# Patient Record
Sex: MALE | Race: WHITE | Employment: OTHER | URBAN - METROPOLITAN AREA
[De-identification: names, ages, dates, MRNs, and addresses within clinical notes are randomized per-mention and may not be internally consistent; named-entity substitution may affect disease eponyms.]

---

## 2019-12-19 ENCOUNTER — HOSPITAL ENCOUNTER (OUTPATIENT)
Dept: SURGERY | Age: 69
Discharge: HOME | End: 2019-12-19

## 2019-12-19 VITALS — HEIGHT: 72 IN | BODY MASS INDEX: 26.68 KG/M2 | WEIGHT: 197 LBS

## 2019-12-19 PROBLEM — J44.9 COPD (CHRONIC OBSTRUCTIVE PULMONARY DISEASE) (HCC): Status: ACTIVE | Noted: 2019-12-19

## 2019-12-19 PROBLEM — Z95.1 H/O CORONARY ARTERY BYPASS SURGERY: Status: ACTIVE | Noted: 2019-12-19

## 2019-12-19 PROBLEM — E11.9 DM (DIABETES MELLITUS) (HCC): Status: ACTIVE | Noted: 2019-12-19

## 2019-12-19 PROBLEM — I48.91 A-FIB (HCC): Status: ACTIVE | Noted: 2019-12-19

## 2019-12-19 PROBLEM — G62.9 NEUROPATHY: Status: ACTIVE | Noted: 2019-12-19

## 2019-12-19 PROBLEM — Z94.0 KIDNEY TRANSPLANT RECIPIENT: Status: ACTIVE | Noted: 2019-12-19

## 2019-12-19 PROBLEM — L97.409 HEEL ULCER (HCC): Status: ACTIVE | Noted: 2019-12-19

## 2019-12-19 PROBLEM — I25.10 CAD (CORONARY ARTERY DISEASE): Status: ACTIVE | Noted: 2019-12-19

## 2019-12-19 PROBLEM — I73.9 PAD (PERIPHERAL ARTERY DISEASE) (HCC): Status: ACTIVE | Noted: 2019-12-19

## 2019-12-19 RX ORDER — UREA 10 %
800 LOTION (ML) TOPICAL DAILY
COMMUNITY

## 2019-12-19 RX ORDER — INSULIN ASPART 100 [IU]/ML
INJECTION, SOLUTION INTRAVENOUS; SUBCUTANEOUS
Status: ON HOLD | COMMUNITY
End: 2020-03-13 | Stop reason: CLARIF

## 2019-12-19 RX ORDER — OMEPRAZOLE 40 MG/1
40 CAPSULE, DELAYED RELEASE ORAL DAILY
COMMUNITY

## 2019-12-19 RX ORDER — CLINDAMYCIN HYDROCHLORIDE 150 MG/1
300 CAPSULE ORAL 2 TIMES DAILY
COMMUNITY
End: 2019-12-23 | Stop reason: CLARIF

## 2019-12-19 RX ORDER — PREDNISONE 20 MG/1
5 TABLET ORAL
COMMUNITY

## 2019-12-19 RX ORDER — INSULIN DEGLUDEC INJECTION 200 U/ML
30 INJECTION, SOLUTION SUBCUTANEOUS DAILY
COMMUNITY

## 2019-12-19 RX ORDER — TACROLIMUS 1 MG/1
2 CAPSULE ORAL EVERY 12 HOURS
COMMUNITY

## 2019-12-19 RX ORDER — CHOLECALCIFEROL (VITAMIN D3) 125 MCG
10 CAPSULE ORAL
COMMUNITY

## 2019-12-19 RX ORDER — LEVOTHYROXINE SODIUM 100 UG/1
100 TABLET ORAL
COMMUNITY

## 2019-12-19 RX ORDER — TRAMADOL HYDROCHLORIDE 50 MG/1
50 TABLET ORAL
COMMUNITY
End: 2020-01-09

## 2019-12-19 RX ORDER — ATORVASTATIN CALCIUM 40 MG/1
40 TABLET, FILM COATED ORAL
COMMUNITY

## 2019-12-19 RX ORDER — AMLODIPINE BESYLATE 10 MG/1
5 TABLET ORAL DAILY
COMMUNITY

## 2019-12-19 RX ORDER — AMOXICILLIN AND CLAVULANATE POTASSIUM 875; 125 MG/1; MG/1
TABLET, FILM COATED ORAL EVERY 12 HOURS
COMMUNITY
End: 2020-03-12

## 2019-12-19 RX ORDER — HYDROCODONE BITARTRATE AND ACETAMINOPHEN 5; 325 MG/1; MG/1
TABLET ORAL
COMMUNITY
End: 2020-01-09

## 2019-12-19 RX ORDER — CHOLECALCIFEROL TAB 125 MCG (5000 UNIT) 125 MCG
5000 TAB ORAL DAILY
COMMUNITY

## 2019-12-19 RX ORDER — ONDANSETRON HYDROCHLORIDE 8 MG/1
8 TABLET, FILM COATED ORAL
COMMUNITY
End: 2020-01-10

## 2019-12-19 RX ORDER — BUDESONIDE AND FORMOTEROL FUMARATE DIHYDRATE 160; 4.5 UG/1; UG/1
2 AEROSOL RESPIRATORY (INHALATION) 2 TIMES DAILY
COMMUNITY

## 2019-12-19 RX ORDER — WARFARIN 3 MG/1
3 TABLET ORAL
COMMUNITY

## 2019-12-19 RX ORDER — ALBUTEROL SULFATE 90 UG/1
AEROSOL, METERED RESPIRATORY (INHALATION)
COMMUNITY

## 2019-12-19 RX ORDER — MYCOPHENOLATE MOFETIL 250 MG/1
500 CAPSULE ORAL 2 TIMES DAILY
COMMUNITY

## 2019-12-19 NOTE — PERIOP NOTES
Per [facility] at Dr. Ishmael Coronel office it is ok for patient to remain on coumadin.

## 2019-12-19 NOTE — PERIOP NOTES
[facility],  for Dr. Eric Garcia regarding coumadin. She is to call surgeon and call me back.

## 2019-12-19 NOTE — PERIOP NOTES
Patient verified name&  . Order to obtain consent not found in EHR & patient verified  case posting. Request sent to [facility] for cardiac records. Type 1B surgery,  assessment complete. Orders no yet received. Labs per surgeon: none at this time  Labs per anesthesia protocol: bmp, hgb, pt/inr signed and held [id number] Patient answered medical/surgical history questions at their best of ability. All prior to admission medications reviewed with patient and documented in [address] [facility]. Patient instructed to take ONLY THE FOLLOWING MEDICATIONS ON THE DAY OF SURGERY according to anesthesia guidelines with sips of water: amlodipine, symbicort inhaler as needed, hydrocodone or tramadol as needed, levothyroxine, omeprazole, zofran if needed, prednisone, prograf, cellcept . Patient instructed to bring albuterol inhaler dos and to take 24 units tresiba insulin dos. Pt verbalizes understanding to HOLD THE FOLLOWING MEDICATIONS: none    PT VERBALIZES UNDERSTANDING TO HOLD ALL VITAMINS AND SUPPLEMENTS 7 DAYS PRIOR TO SURGERY DATE (with exception to Renal Vitamins) and NSAIDS 5 DAYS PRIOR TO SURGERY DATE PER ANESTHESIA PROTOCOL. Patient instructed on the following:  Arrive at [facility], time of arrival to be called the day before by 1700. NPO after midnight including gum, mints, tobacco and ice chips. Responsible adult must drive patient to the hospital, stay during surgery, and patient requires supervision 24 hours after anesthesia  Use non-moisturizing soap in shower the night before surgery and on the morning of surgery. Leave all valuables (money and jewelry) at home but bring insurance card and ID on DOS. Do not wear make-up, nail polish, lotions, cologne, perfumes, powders, or oil on skin. Patient teach back successful and patient demonstrates knowledge of instruction. If you do not receive a call with your arrival time by 5pm the business day prior to surgery, please call [phone].

## 2019-12-23 ENCOUNTER — HOSPITAL ENCOUNTER (OUTPATIENT)
Dept: SURGERY | Age: 69
Discharge: HOME | End: 2019-12-23
Payer: MEDICARE

## 2019-12-23 VITALS
WEIGHT: 207 LBS | TEMPERATURE: 98.1 F | RESPIRATION RATE: 18 BRPM | DIASTOLIC BLOOD PRESSURE: 74 MMHG | BODY MASS INDEX: 28.04 KG/M2 | SYSTOLIC BLOOD PRESSURE: 128 MMHG | HEART RATE: 87 BPM | HEIGHT: 72 IN | OXYGEN SATURATION: 97 %

## 2019-12-23 LAB
ANION GAP SERPL CALC-SCNC: 7 MMOL/L (ref 7–16)
ATRIAL RATE: 241 BPM
BASOPHILS # BLD: 0.1 K/UL (ref 0–0.2)
BASOPHILS NFR BLD: 1 % (ref 0–2)
BUN SERPL-MCNC: 28 MG/DL (ref 8–23)
CALCIUM SERPL-MCNC: 9.4 MG/DL (ref 8.3–10.4)
CALCULATED R AXIS, ECG10: -91 DEGREES
CALCULATED T AXIS, ECG11: 94 DEGREES
CHLORIDE SERPL-SCNC: 102 MMOL/L (ref 98–107)
CO2 SERPL-SCNC: 27 MMOL/L (ref 21–32)
CREAT SERPL-MCNC: 1.38 MG/DL (ref 0.8–1.5)
DIAGNOSIS, 93000: NORMAL
DIFFERENTIAL METHOD BLD: ABNORMAL
EOSINOPHIL # BLD: 0.2 K/UL (ref 0–0.8)
EOSINOPHIL NFR BLD: 1 % (ref 0.5–7.8)
ERYTHROCYTE [DISTWIDTH] IN BLOOD BY AUTOMATED COUNT: 14.3 % (ref 11.9–14.6)
GLUCOSE BLD STRIP.AUTO-MCNC: 183 MG/DL (ref 65–100)
GLUCOSE SERPL-MCNC: 194 MG/DL (ref 65–100)
HCT VFR BLD AUTO: 51.6 % (ref 41.1–50.3)
HGB BLD-MCNC: 15.9 G/DL (ref 13.6–17.2)
IMM GRANULOCYTES # BLD AUTO: 0.2 K/UL (ref 0–0.5)
IMM GRANULOCYTES NFR BLD AUTO: 1 % (ref 0–5)
LYMPHOCYTES # BLD: 1.9 K/UL (ref 0.5–4.6)
LYMPHOCYTES NFR BLD: 15 % (ref 13–44)
MCH RBC QN AUTO: 29.3 PG (ref 26.1–32.9)
MCHC RBC AUTO-ENTMCNC: 30.8 G/DL (ref 31.4–35)
MCV RBC AUTO: 95.2 FL (ref 79.6–97.8)
MONOCYTES # BLD: 1.1 K/UL (ref 0.1–1.3)
MONOCYTES NFR BLD: 9 % (ref 4–12)
NEUTS SEG # BLD: 9.4 K/UL (ref 1.7–8.2)
NEUTS SEG NFR BLD: 74 % (ref 43–78)
NRBC # BLD: 0 K/UL (ref 0–0.2)
PLATELET # BLD AUTO: 263 K/UL (ref 150–450)
PMV BLD AUTO: 8.4 FL (ref 9.4–12.3)
POTASSIUM SERPL-SCNC: 4.9 MMOL/L (ref 3.5–5.1)
Q-T INTERVAL, ECG07: 452 MS
QRS DURATION, ECG06: 162 MS
QTC CALCULATION (BEZET), ECG08: 458 MS
RBC # BLD AUTO: 5.42 M/UL (ref 4.23–5.6)
SODIUM SERPL-SCNC: 136 MMOL/L (ref 136–145)
VENTRICULAR RATE, ECG03: 62 BPM
WBC # BLD AUTO: 12.8 K/UL (ref 4.3–11.1)

## 2019-12-23 PROCEDURE — 82962 GLUCOSE BLOOD TEST: CPT

## 2019-12-23 PROCEDURE — 85025 COMPLETE CBC W/AUTO DIFF WBC: CPT

## 2019-12-23 PROCEDURE — 93005 ELECTROCARDIOGRAM TRACING: CPT | Performed by: ANESTHESIOLOGY

## 2019-12-23 PROCEDURE — 93005 ELECTROCARDIOGRAM TRACING: CPT | Performed by: SURGERY

## 2019-12-23 PROCEDURE — 80048 BASIC METABOLIC PNL TOTAL CA: CPT

## 2019-12-23 NOTE — PERIOP NOTES
Dr. Rosie Whitney informed of pt Hx, kidney transplant, cardiac Hx, pacemaker,recent diagnosis of pneumonia completing antibiotics. No respiratory distress- ok to proceed, pacer rep is NOT needed.

## 2019-12-23 NOTE — PERIOP NOTES
Patient verified name and . Patient provided medical/health information and PTA medications to the best of their ability. TYPE  CASE:1b  Order for consent yes found in EHR and matches case posting. Labs per surgeon:cbc,bmp. Results: -  Labs per anesthesia protocol: poc glucose. Results -  EKG  :  yes    Patient provided with and instructed on education handouts including Guide to Surgery, blood transfusions, pain management, and hand hygiene for the family and community, and [facility] Anesthesia [facility] brochure.     hibiclens and instructions given per hospital policy. Instructed patient to continue previous medications as prescribed prior to surgery unless otherwise directed and to take the following medications the day of surgery according to anesthesia guidelines : albuterol as needed, norco or tramadol as needed,zofran as needed,symbicort,levithyroxine,prilosec,prednisone,cellcept,prograf, tresiba insulin-24 units . Instructed patient to hold  the following medications: coumadin,all supplements. Original medication prescription bottles none visualized during patient appointment. Patient teach back successful and patient demonstrates knowledge of instruction.

## 2019-12-25 ENCOUNTER — ANESTHESIA EVENT (OUTPATIENT)
Dept: SURGERY | Age: 69
End: 2019-12-25
Payer: MEDICARE

## 2019-12-26 ENCOUNTER — ANESTHESIA (OUTPATIENT)
Dept: SURGERY | Age: 69
End: 2019-12-26
Payer: MEDICARE

## 2019-12-26 ENCOUNTER — HOSPITAL ENCOUNTER (OUTPATIENT)
Age: 69
Setting detail: OUTPATIENT SURGERY
Discharge: HOME | End: 2019-12-26
Attending: SURGERY | Admitting: SURGERY
Payer: MEDICARE

## 2019-12-26 ENCOUNTER — APPOINTMENT (OUTPATIENT)
Dept: INTERVENTIONAL RADIOLOGY/VASCULAR | Age: 69
End: 2019-12-26
Attending: SURGERY
Payer: MEDICARE

## 2019-12-26 VITALS
RESPIRATION RATE: 18 BRPM | WEIGHT: 206.8 LBS | HEART RATE: 59 BPM | HEIGHT: 72 IN | BODY MASS INDEX: 28.01 KG/M2 | TEMPERATURE: 97.6 F | DIASTOLIC BLOOD PRESSURE: 71 MMHG | SYSTOLIC BLOOD PRESSURE: 138 MMHG | OXYGEN SATURATION: 93 %

## 2019-12-26 LAB
GLUCOSE BLD STRIP.AUTO-MCNC: 81 MG/DL (ref 65–100)
GLUCOSE BLD STRIP.AUTO-MCNC: 87 MG/DL (ref 65–100)
INR BLD: 1.1 (ref 0.9–1.2)
PT BLD: 13.2 SECS (ref 9.6–11.6)

## 2019-12-26 PROCEDURE — 74011250636 HC RX REV CODE- 250/636: Performed by: ANESTHESIOLOGY

## 2019-12-26 PROCEDURE — C1769 GUIDE WIRE: HCPCS

## 2019-12-26 PROCEDURE — 74011250636 HC RX REV CODE- 250/636: Performed by: NURSE ANESTHETIST, CERTIFIED REGISTERED

## 2019-12-26 PROCEDURE — C1725 CATH, TRANSLUMIN NON-LASER: HCPCS

## 2019-12-26 PROCEDURE — 76937 US GUIDE VASCULAR ACCESS: CPT

## 2019-12-26 PROCEDURE — 37226 HC PLC STNT FEMPOP UNI +/-PTA: CPT

## 2019-12-26 PROCEDURE — C1887 CATHETER, GUIDING: HCPCS

## 2019-12-26 PROCEDURE — 76060000035 HC ANESTHESIA 2 TO 2.5 HR: Performed by: SURGERY

## 2019-12-26 PROCEDURE — 76210000026 HC REC RM PH II 1 TO 1.5 HR: Performed by: SURGERY

## 2019-12-26 PROCEDURE — 74011250636 HC RX REV CODE- 250/636: Performed by: SURGERY

## 2019-12-26 PROCEDURE — 76010000131 HC OR TIME 2 TO 2.5 HR: Performed by: SURGERY

## 2019-12-26 PROCEDURE — C1760 CLOSURE DEV, VASC: HCPCS

## 2019-12-26 PROCEDURE — C1894 INTRO/SHEATH, NON-LASER: HCPCS

## 2019-12-26 PROCEDURE — 74011636320 HC RX REV CODE- 636/320: Performed by: SURGERY

## 2019-12-26 PROCEDURE — C1874 STENT, COATED/COV W/DEL SYS: HCPCS

## 2019-12-26 PROCEDURE — 82962 GLUCOSE BLOOD TEST: CPT

## 2019-12-26 PROCEDURE — 74011250637 HC RX REV CODE- 250/637: Performed by: SURGERY

## 2019-12-26 PROCEDURE — 77030013519 HC DEV INFL BASIX MRTM -B

## 2019-12-26 PROCEDURE — 76210000016 HC OR PH I REC 1 TO 1.5 HR: Performed by: SURGERY

## 2019-12-26 PROCEDURE — 77030032660 HC CATH ANGI PRPH SUPP CXI COOK -C

## 2019-12-26 PROCEDURE — 75710 ARTERY X-RAYS ARM/LEG: CPT

## 2019-12-26 PROCEDURE — 85610 PROTHROMBIN TIME: CPT

## 2019-12-26 DEVICE — ZILVER PTX, DRUG-ELUTING PERIPHERAL STENT
Type: IMPLANTABLE DEVICE | Site: GROIN | Status: FUNCTIONAL
Brand: ZILVER PTX

## 2019-12-26 RX ORDER — CLOPIDOGREL BISULFATE 75 MG/1
75 TABLET ORAL DAILY
Qty: 30 TAB | Refills: 5 | Status: SHIPPED | OUTPATIENT
Start: 2019-12-26

## 2019-12-26 RX ORDER — SODIUM CHLORIDE 0.9 % (FLUSH) 0.9 %
5-40 SYRINGE (ML) INJECTION AS NEEDED
Status: DISCONTINUED | OUTPATIENT
Start: 2019-12-26 | End: 2019-12-26 | Stop reason: HOSPADM

## 2019-12-26 RX ORDER — SODIUM CHLORIDE 0.9 % (FLUSH) 0.9 %
5-40 SYRINGE (ML) INJECTION EVERY 8 HOURS
Status: DISCONTINUED | OUTPATIENT
Start: 2019-12-26 | End: 2019-12-26 | Stop reason: HOSPADM

## 2019-12-26 RX ORDER — ONDANSETRON 2 MG/ML
4 INJECTION INTRAMUSCULAR; INTRAVENOUS
Status: DISCONTINUED | OUTPATIENT
Start: 2019-12-26 | End: 2019-12-26 | Stop reason: HOSPADM

## 2019-12-26 RX ORDER — NALOXONE HYDROCHLORIDE 0.4 MG/ML
0.2 INJECTION, SOLUTION INTRAMUSCULAR; INTRAVENOUS; SUBCUTANEOUS
Status: DISCONTINUED | OUTPATIENT
Start: 2019-12-26 | End: 2019-12-26 | Stop reason: HOSPADM

## 2019-12-26 RX ORDER — SODIUM CHLORIDE, SODIUM LACTATE, POTASSIUM CHLORIDE, CALCIUM CHLORIDE 600; 310; 30; 20 MG/100ML; MG/100ML; MG/100ML; MG/100ML
75 INJECTION, SOLUTION INTRAVENOUS CONTINUOUS
Status: DISCONTINUED | OUTPATIENT
Start: 2019-12-26 | End: 2019-12-26 | Stop reason: HOSPADM

## 2019-12-26 RX ORDER — OXYCODONE AND ACETAMINOPHEN 5; 325 MG/1; MG/1
1 TABLET ORAL
Status: DISCONTINUED | OUTPATIENT
Start: 2019-12-26 | End: 2019-12-26 | Stop reason: HOSPADM

## 2019-12-26 RX ORDER — CEFAZOLIN SODIUM/WATER 2 G/20 ML
2 SYRINGE (ML) INTRAVENOUS ONCE
Status: COMPLETED | OUTPATIENT
Start: 2019-12-26 | End: 2019-12-26

## 2019-12-26 RX ORDER — SODIUM CHLORIDE, SODIUM LACTATE, POTASSIUM CHLORIDE, CALCIUM CHLORIDE 600; 310; 30; 20 MG/100ML; MG/100ML; MG/100ML; MG/100ML
25 INJECTION, SOLUTION INTRAVENOUS CONTINUOUS
Status: DISCONTINUED | OUTPATIENT
Start: 2019-12-26 | End: 2019-12-26 | Stop reason: HOSPADM

## 2019-12-26 RX ORDER — NALOXONE HYDROCHLORIDE 0.4 MG/ML
0.2 INJECTION, SOLUTION INTRAMUSCULAR; INTRAVENOUS; SUBCUTANEOUS AS NEEDED
Status: DISCONTINUED | OUTPATIENT
Start: 2019-12-26 | End: 2019-12-26 | Stop reason: HOSPADM

## 2019-12-26 RX ORDER — MIDAZOLAM HYDROCHLORIDE 1 MG/ML
2 INJECTION, SOLUTION INTRAMUSCULAR; INTRAVENOUS
Status: DISCONTINUED | OUTPATIENT
Start: 2019-12-26 | End: 2019-12-26 | Stop reason: HOSPADM

## 2019-12-26 RX ORDER — FENTANYL CITRATE 50 UG/ML
100 INJECTION, SOLUTION INTRAMUSCULAR; INTRAVENOUS ONCE
Status: DISCONTINUED | OUTPATIENT
Start: 2019-12-26 | End: 2019-12-26 | Stop reason: HOSPADM

## 2019-12-26 RX ORDER — DIPHENHYDRAMINE HYDROCHLORIDE 50 MG/ML
12.5 INJECTION, SOLUTION INTRAMUSCULAR; INTRAVENOUS
Status: DISCONTINUED | OUTPATIENT
Start: 2019-12-26 | End: 2019-12-26 | Stop reason: HOSPADM

## 2019-12-26 RX ORDER — OXYCODONE HYDROCHLORIDE 5 MG/1
5 TABLET ORAL
Status: DISCONTINUED | OUTPATIENT
Start: 2019-12-26 | End: 2019-12-26 | Stop reason: HOSPADM

## 2019-12-26 RX ORDER — SODIUM CHLORIDE 9 MG/ML
1 INJECTION, SOLUTION INTRAVENOUS AS NEEDED
Status: DISCONTINUED | OUTPATIENT
Start: 2019-12-26 | End: 2019-12-26 | Stop reason: HOSPADM

## 2019-12-26 RX ORDER — CLOPIDOGREL BISULFATE 75 MG/1
300 TABLET ORAL ONCE
Status: COMPLETED | OUTPATIENT
Start: 2019-12-26 | End: 2019-12-26

## 2019-12-26 RX ORDER — HYDROMORPHONE HYDROCHLORIDE 2 MG/ML
0.5 INJECTION, SOLUTION INTRAMUSCULAR; INTRAVENOUS; SUBCUTANEOUS
Status: DISCONTINUED | OUTPATIENT
Start: 2019-12-26 | End: 2019-12-26 | Stop reason: HOSPADM

## 2019-12-26 RX ORDER — PROPOFOL 10 MG/ML
INJECTION, EMULSION INTRAVENOUS AS NEEDED
Status: DISCONTINUED | OUTPATIENT
Start: 2019-12-26 | End: 2019-12-26 | Stop reason: HOSPADM

## 2019-12-26 RX ORDER — LIDOCAINE HYDROCHLORIDE 10 MG/ML
0.1 INJECTION INFILTRATION; PERINEURAL AS NEEDED
Status: DISCONTINUED | OUTPATIENT
Start: 2019-12-26 | End: 2019-12-26 | Stop reason: HOSPADM

## 2019-12-26 RX ORDER — HEPARIN SODIUM 1000 [USP'U]/ML
INJECTION, SOLUTION INTRAVENOUS; SUBCUTANEOUS AS NEEDED
Status: DISCONTINUED | OUTPATIENT
Start: 2019-12-26 | End: 2019-12-26 | Stop reason: HOSPADM

## 2019-12-26 RX ORDER — PROPOFOL 10 MG/ML
INJECTION, EMULSION INTRAVENOUS
Status: DISCONTINUED | OUTPATIENT
Start: 2019-12-26 | End: 2019-12-26 | Stop reason: HOSPADM

## 2019-12-26 RX ORDER — HEPARIN SODIUM 200 [USP'U]/100ML
INJECTION, SOLUTION INTRAVENOUS
Status: COMPLETED | OUTPATIENT
Start: 2019-12-26 | End: 2019-12-26

## 2019-12-26 RX ORDER — MORPHINE SULFATE 10 MG/ML
1 INJECTION, SOLUTION INTRAMUSCULAR; INTRAVENOUS
Status: DISCONTINUED | OUTPATIENT
Start: 2019-12-26 | End: 2019-12-26 | Stop reason: HOSPADM

## 2019-12-26 RX ORDER — SODIUM CHLORIDE 9 MG/ML
3 INJECTION, SOLUTION INTRAVENOUS ONCE
Status: COMPLETED | OUTPATIENT
Start: 2019-12-26 | End: 2019-12-26

## 2019-12-26 RX ORDER — SODIUM CHLORIDE 9 MG/ML
125 INJECTION, SOLUTION INTRAVENOUS CONTINUOUS
Status: DISCONTINUED | OUTPATIENT
Start: 2019-12-26 | End: 2019-12-26 | Stop reason: HOSPADM

## 2019-12-26 RX ORDER — MIDAZOLAM HYDROCHLORIDE 1 MG/ML
2 INJECTION, SOLUTION INTRAMUSCULAR; INTRAVENOUS ONCE
Status: DISCONTINUED | OUTPATIENT
Start: 2019-12-26 | End: 2019-12-26 | Stop reason: HOSPADM

## 2019-12-26 RX ADMIN — PROPOFOL 200 MCG/KG/MIN: 10 INJECTION, EMULSION INTRAVENOUS at 07:33

## 2019-12-26 RX ADMIN — PHENYLEPHRINE HYDROCHLORIDE 200 MCG: 10 INJECTION INTRAVENOUS at 08:29

## 2019-12-26 RX ADMIN — PROPOFOL 80 MG: 10 INJECTION, EMULSION INTRAVENOUS at 07:33

## 2019-12-26 RX ADMIN — PHENYLEPHRINE HYDROCHLORIDE 100 MCG: 10 INJECTION INTRAVENOUS at 08:36

## 2019-12-26 RX ADMIN — SODIUM CHLORIDE 3 ML/KG/HR: 900 INJECTION, SOLUTION INTRAVENOUS at 06:44

## 2019-12-26 RX ADMIN — HEPARIN SODIUM 2000 UNITS: 1000 INJECTION INTRAVENOUS; SUBCUTANEOUS at 08:34

## 2019-12-26 RX ADMIN — Medication 2 G: at 07:37

## 2019-12-26 RX ADMIN — CLOPIDOGREL BISULFATE 300 MG: 75 TABLET ORAL at 11:49

## 2019-12-26 RX ADMIN — SODIUM CHLORIDE, SODIUM LACTATE, POTASSIUM CHLORIDE, AND CALCIUM CHLORIDE: 600; 310; 30; 20 INJECTION, SOLUTION INTRAVENOUS at 07:28

## 2019-12-26 RX ADMIN — HYDROMORPHONE HYDROCHLORIDE 0.5 MG: 2 INJECTION INTRAMUSCULAR; INTRAVENOUS; SUBCUTANEOUS at 10:08

## 2019-12-26 RX ADMIN — HEPARIN SODIUM 4000 UNITS: 1000 INJECTION INTRAVENOUS; SUBCUTANEOUS at 07:52

## 2019-12-26 NOTE — ANESTHESIA PREPROCEDURE EVALUATION
Relevant Problems   No relevant active problems       Anesthetic History               Review of Systems / Medical History  Patient summary reviewed and pertinent labs reviewed    Pulmonary    COPD: mild    Sleep apnea (30 pk years; Quit 2014)           Neuro/Psych     seizures (After fall)         Cardiovascular    Hypertension: well controlled        Dysrhythmias (SSS) : atrial fibrillation  CAD and PAD (Non healing ulcer)    Exercise tolerance: >4 METS  Comments: Denies CP, SOB or changes in functional status   GI/Hepatic/Renal     GERD: well controlled           Endo/Other    Diabetes: well controlled, type 2, using insulin  Hypothyroidism: well controlled       Other Findings              Physical Exam    Airway  Mallampati: II  TM Distance: 4 - 6 cm  Neck ROM: normal range of motion   Mouth opening: Normal     Cardiovascular    Rhythm: regular  Rate: normal         Dental    Dentition: Edentulous     Pulmonary  Breath sounds clear to auscultation               Abdominal  GI exam deferred       Other Findings            Anesthetic Plan    ASA: 3  Anesthesia type: total IV anesthesia          Induction: Intravenous  Anesthetic plan and risks discussed with: Patient

## 2019-12-26 NOTE — DISCHARGE INSTRUCTIONS
Your Recovery  The doctor inserted a thin, flexible tube (catheter) into a blood vessel in your groin. In some cases, the catheter is placed in a blood vessel in the arm. After an arteriogram, your groin or arm may have a bruise and feel sore for a day or two. You can do light activities around the house but nothing strenuous for several days. Your doctor may give you specific instructions on when you can do your normal activities again, such as driving and going back to work. This care sheet gives you a general idea about how long it will take for you to recover. But each person recovers at a different pace. Follow the steps below to feel better as quickly as possible. How can you care for yourself at home? Activity  · Do not do strenuous exercise and do not lift, pull, or push anything heavy until your doctor says it is okay. This may be for a day or two. You can walk around the house and do light activity, such as cooking. · You may shower 24 to 48 hours after the procedure, if your doctor okays it. Pat the incision dry. Do not take a bath for 1 week, or until your doctor tells you it is okay. · If the catheter was placed in your groin, try not to walk up stairs for the first couple of days. · If your doctor recommends it, get more exercise. Walking is a good choice. Bit by bit, increase the amount you walk every day. Try for at least 30 minutes on most days of the week. Diet  · Drink plenty of fluids to help your body flush out the dye. If you have kidney, heart, or liver disease and have to limit fluids, talk with your doctor before you increase the amount of fluids you drink. · You can eat your normal diet. If your stomach is upset, try bland, low-fat foods like plain rice, broiled chicken, toast, and yogurt. Medicines  · Be safe with medicines. Read and follow all instructions on the label. ¨ If the doctor gave you a prescription medicine for pain, take it as prescribed.   ¨ If you are not taking a prescription pain medicine, ask your doctor if you can take an over-the-counter medicine. · If you take blood thinners, such as warfarin (Coumadin), clopidogrel (Plavix), or aspirin, be sure to talk to your doctor. He or she will tell you if and when to start taking those medicines again. Make sure that you understand exactly what your doctor wants you to do. · Your doctor will tell you if and when you can restart your medicines. He or she will also give you instructions about taking any new medicines. Care of the catheter site  · You will have a dressing over the cut (incision). A dressing helps the incision heal and protects it. Your doctor will tell you how to take care of this. · Put ice or a cold pack on the area for 10 to 20 minutes at a time to help with soreness or swelling. Put a thin cloth between the ice and your skin. Follow-up care is a key part of your treatment and safety. Be sure to make and go to all appointments, and call your doctor if you are having problems. It's also a good idea to know your test results and keep a list of the medicines you take. When should you call for help? Call 911 anytime you think you may need emergency care. For example, call if:  · You passed out (lost consciousness). · You have severe trouble breathing. · You have sudden chest pain and shortness of breath, or you cough up blood. Call your doctor now or seek immediate medical care if:  · You are bleeding from the area where the catheter was put in your artery. · You have a fast-growing, painful lump at the catheter site. · You have signs of infection, such as:  ¨ Increased pain, swelling, warmth, or redness. ¨ Red streaks leading from the incision. ¨ Pus draining from the incision. ¨ A fever. Watch closely for any changes in your health, and be sure to contact your doctor if:  · You don't get better as expected.     After general anesthesia or intravenous sedation, for 24 hours or while taking prescription Narcotics:  · Limit your activities  · A responsible adult needs to be with you for the next 24 hours  · Do not drive and operate hazardous machinery  · Do not make important personal or business decisions  · Do  not drink alcoholic beverages  · If you have not urinated within 8 hours after discharge, please contact your surgeon on call. · If you have sleep apnea and have a CPAP machine, please use it for all naps and sleeping. · Please use caution when taking narcotics and any of your home medications that may cause drowsiness. *  Please give a list of your current medications to your Primary Care Provider. *  Please update this list whenever your medications are discontinued, doses are      changed, or new medications (including over-the-counter products) are added. *  Please carry medication information at all times in case of emergency situations. These are general instructions for a healthy lifestyle:  No smoking/ No tobacco products/ Avoid exposure to second hand smoke  Surgeon General's Warning:  Quitting smoking now greatly reduces serious risk to your health. Obesity, smoking, and sedentary lifestyle greatly increases your risk for illness  A healthy diet, regular physical exercise & weight monitoring are important for maintaining a healthy lifestyle    You may be retaining fluid if you have a history of heart failure or if you experience any of the following symptoms:  Weight gain of 3 pounds or more overnight or 5 pounds in a week, increased swelling in our hands or feet or shortness of breath while lying flat in bed. Please call your doctor as soon as you notice any of these symptoms; do not wait until your next office visit.

## 2019-12-26 NOTE — ANESTHESIA POSTPROCEDURE EVALUATION
Procedure(s):  RIGHT LOWER EXTREMITY ARTERIOGRAM WITH ULTRASOUND GUIDED ACCESS/STENT PLACEMENT/PTA.    total IV anesthesia    Anesthesia Post Evaluation      Multimodal analgesia: multimodal analgesia used between 6 hours prior to anesthesia start to PACU discharge  Patient location during evaluation: bedside  Patient participation: complete - patient participated  Level of consciousness: awake and alert  Pain score: 1  Pain management: adequate  Airway patency: patent  Anesthetic complications: no  Cardiovascular status: acceptable  Respiratory status: acceptable  Hydration status: acceptable  Comments: Pt doing well. Ok to d/c home. Post anesthesia nausea and vomiting:  none      Vitals Value Taken Time   /82 12/26/2019 10:14 AM   Temp 36.4 °C (97.6 °F) 12/26/2019 10:12 AM   Pulse 59 12/26/2019 10:16 AM   Resp 18 12/26/2019 10:12 AM   SpO2 91 % 12/26/2019 10:16 AM   Vitals shown include unvalidated device data.

## 2019-12-26 NOTE — BRIEF OP NOTE
BRIEF OPERATIVE NOTE    Date of Procedure: 12/26/2019   Preoperative Diagnosis: PAD (peripheral artery disease) ([facility]) [I73.9]  Postoperative Diagnosis: PAD (peripheral artery disease) ([facility]) [I73.9]    Procedure(s):  RIGHT LOWER EXTREMITY ARTERIOGRAM WITH ULTRASOUND GUIDED ACCESS/STENT PLACEMENT/BALLOON  Surgeon(s) and Role:     * Shalini Noguera MD - Primary         Surgical Assistant:     Surgical Staff:  Circ-1: Lenin Stauffer RN  Circ-Relief: Charo Aldridge RN  Radiology Technician: Paco Mitchell; Lavonne Pandya RT, R, CT  Event Time In Time Out   Incision Start  7:50 AM    Incision Close  9:24 AM      Anesthesia: General   Estimated Blood Loss: minimal  Specimens: * No specimens in log *   Findings:    Complications: none  Implants:   Implant Name Type Inv.  Item Serial No.  Lot No. LRB No. Used Action   STENT SERG W/THMBWHL 40X6MM -- ZILVER [id number] - [id number] Stent STENT SERG W/THMBWHL 40X6MM -- [employer] [id number] Right 1 Implanted   STENT SERG [id number] -- ZILVER [id number] - [id number] Stent STENT SERG W/[id number] -- ZILVER [id number]  [employer] [id number] Right 1 Implanted

## 2019-12-28 NOTE — PROCEDURES
[address] [facility]  PROCEDURE NOTE    Name:  Anya Evnagelista  MR#:  [id number]  :  1950  ACCOUNT #:  [de-identified]  DATE OF SERVICE:  2019    PREOPERATIVE DIAGNOSES:  Nonhealing right foot wound, right lower extremity ischemia. POSTOPERATIVE DIAGNOSES:  Nonhealing right foot wound, right lower extremity ischemia. PROCEDURES PERFORMED:  1. Aortogram, right lower extremity arteriogram, PTA and stent of right popliteal artery. 2.  Ultrasound-guided vascular access. SURGEON:  Destinee Hudson MD    ASSISTANT:  None. ANESTHESIA:  IV sedation plus 1% lidocaine as local.    TOTAL CONTRAST:  21 mL. TOTAL FLUORO TIME:  37 minutes. COMPLICATIONS:  .    SPECIMENS REMOVED:  .    IMPLANTS:  .    ESTIMATED BLOOD LOSS:  .    DESCRIPTION OF THE PROCEDURE:  See the patient was brought to the angio suite, placed on the angio table in the supine position. Following adequate IV sedation and time-out procedure, the groins were draped and prepped in the sterile fashion. Left common femoral artery was then percutaneously punctured under direct vision using ultrasound. A 5-Brazilian sheath was placed over guidewire via Seldinger technique. Next, a 0.035 guidewire was advanced in the aorta followed by 5-Brazilian Omni flush catheter. Using CO2, abdominal aortogram was performed. The catheter was pulled down just above the aortic bifurcation where a pelvic imaging was performed again with CO2. Next, the catheter was used to direct the guidewire into the right side. A 6-Brazilian crossover sheath was advanced from the left to the right. Using CO2, the right lower extremity was  throughout its length. The area of popliteal occlusion was identified as well as distal SFA disease. The patient was systemically heparinized. Next, a 0.014 guidewire and catheter combination were used to cross the area of short segment occlusion of the popliteal artery.   A very limited contrast injection was performed to evaluate the occlusion as well as the distal flow. Next, the area was predilated with a 3 mm balloon followed by a 4 mm balloon. Ultimately, a 6 x 40 Zilver PTX stent was placed. This was postdilated with a 5 mm balloon. This demonstrated excellent result and again, a limited contrast injection was performed. There is area of irregularity with stenosis in the distal SFA as well. At this point, a second stent was placed which was 6 x 120 Zilver PTX stent. This was postdilated to 6 mm. Completion imaging demonstrated excellent flow with antegrade flow across the previously occluded segment and in-line flow to the foot. At this point, guidewires and catheters removed and the site was closed with a Mynx closure device. Sterile dry dressings were applied. The patient was awakened from anesthesia and transported to recovery room in stable addition. The patient tolerated the procedure well. No complications. ANGIOGRAPHIC FINDINGS:  The CO2 arteriogram demonstrated the aorta to be of normal caliber. There is no evidence of stenosis or aneurysmal change. The common internal and external iliac arteries were patent. The transplant renal artery was identified as it arises from the external iliac artery and there is a nephrogram present. Beyond this, the common femoral and profunda are patent. There was very moderate stenosis of the proximal SFA. Distally, there is significant stenosis at the level of Jeff's canal and then a short segment occlusion of the popliteal artery. There is reconstitution of the distal popliteal artery. The anterior tibial artery is patent to the foot. The posterior tibial artery is patent to the foot as well as fairly atretic at the foot. There is filling in the plantar arch on the foot via the dorsalis pedis artery. IMPRESSION:  Satisfactory recannulation of short segment right popliteal artery occlusion.       MD JENNY [id number]  D:  12/27/2019 10:50  T:  12/27/2019 22:53  JOB #:  [id number]

## 2020-01-09 ENCOUNTER — ANESTHESIA EVENT (OUTPATIENT)
Dept: SURGERY | Age: 70
End: 2020-01-09
Payer: MEDICARE

## 2020-01-10 ENCOUNTER — ANESTHESIA (OUTPATIENT)
Dept: SURGERY | Age: 70
End: 2020-01-10
Payer: MEDICARE

## 2020-01-10 ENCOUNTER — HOSPITAL ENCOUNTER (OUTPATIENT)
Age: 70
Setting detail: OUTPATIENT SURGERY
Discharge: HOME | End: 2020-01-10
Attending: PODIATRIST | Admitting: PODIATRIST
Payer: MEDICARE

## 2020-01-10 VITALS
HEART RATE: 62 BPM | HEIGHT: 73 IN | SYSTOLIC BLOOD PRESSURE: 168 MMHG | BODY MASS INDEX: 27.3 KG/M2 | DIASTOLIC BLOOD PRESSURE: 78 MMHG | RESPIRATION RATE: 16 BRPM | WEIGHT: 206 LBS | TEMPERATURE: 98 F | OXYGEN SATURATION: 95 %

## 2020-01-10 DIAGNOSIS — Z98.890 S/P FOOT SURGERY, RIGHT: Primary | ICD-10-CM

## 2020-01-10 LAB
GLUCOSE BLD STRIP.AUTO-MCNC: 103 MG/DL (ref 65–100)
GLUCOSE BLD STRIP.AUTO-MCNC: 149 MG/DL (ref 65–100)
GLUCOSE BLD STRIP.AUTO-MCNC: 226 MG/DL (ref 65–100)
INR BLD: 1.2 (ref 0.9–1.2)
PT BLD: 14.1 SECS (ref 9.6–11.6)

## 2020-01-10 PROCEDURE — 76210000006 HC OR PH I REC 0.5 TO 1 HR: Performed by: PODIATRIST

## 2020-01-10 PROCEDURE — 74011250636 HC RX REV CODE- 250/636: Performed by: PODIATRIST

## 2020-01-10 PROCEDURE — 74011000250 HC RX REV CODE- 250: Performed by: NURSE ANESTHETIST, CERTIFIED REGISTERED

## 2020-01-10 PROCEDURE — 77030000032 HC CUF TRNQT ZIMM -B: Performed by: PODIATRIST

## 2020-01-10 PROCEDURE — 74011250636 HC RX REV CODE- 250/636: Performed by: NURSE ANESTHETIST, CERTIFIED REGISTERED

## 2020-01-10 PROCEDURE — 76060000032 HC ANESTHESIA 0.5 TO 1 HR: Performed by: PODIATRIST

## 2020-01-10 PROCEDURE — 82962 GLUCOSE BLOOD TEST: CPT

## 2020-01-10 PROCEDURE — 74011250636 HC RX REV CODE- 250/636: Performed by: ANESTHESIOLOGY

## 2020-01-10 PROCEDURE — 76010000160 HC OR TIME 0.5 TO 1 HR INTENSV-TIER 1: Performed by: PODIATRIST

## 2020-01-10 PROCEDURE — 74011636637 HC RX REV CODE- 636/637: Performed by: ANESTHESIOLOGY

## 2020-01-10 PROCEDURE — 76210000020 HC REC RM PH II FIRST 0.5 HR: Performed by: PODIATRIST

## 2020-01-10 PROCEDURE — 77030018836 HC SOL IRR NACL ICUM -A: Performed by: PODIATRIST

## 2020-01-10 PROCEDURE — 74011250637 HC RX REV CODE- 250/637: Performed by: ANESTHESIOLOGY

## 2020-01-10 PROCEDURE — 85610 PROTHROMBIN TIME: CPT

## 2020-01-10 PROCEDURE — 74011000250 HC RX REV CODE- 250: Performed by: PODIATRIST

## 2020-01-10 DEVICE — IMPLANTABLE DEVICE: Type: IMPLANTABLE DEVICE | Site: HEEL | Status: FUNCTIONAL

## 2020-01-10 RX ORDER — PROPOFOL 10 MG/ML
INJECTION, EMULSION INTRAVENOUS AS NEEDED
Status: DISCONTINUED | OUTPATIENT
Start: 2020-01-10 | End: 2020-01-10 | Stop reason: HOSPADM

## 2020-01-10 RX ORDER — INSULIN LISPRO 100 [IU]/ML
5 INJECTION, SOLUTION INTRAVENOUS; SUBCUTANEOUS ONCE
Status: COMPLETED | OUTPATIENT
Start: 2020-01-10 | End: 2020-01-10

## 2020-01-10 RX ORDER — LIDOCAINE HYDROCHLORIDE 10 MG/ML
INJECTION INFILTRATION; PERINEURAL AS NEEDED
Status: DISCONTINUED | OUTPATIENT
Start: 2020-01-10 | End: 2020-01-10 | Stop reason: HOSPADM

## 2020-01-10 RX ORDER — PROPOFOL 10 MG/ML
INJECTION, EMULSION INTRAVENOUS
Status: DISCONTINUED | OUTPATIENT
Start: 2020-01-10 | End: 2020-01-10 | Stop reason: HOSPADM

## 2020-01-10 RX ORDER — ONDANSETRON 2 MG/ML
4 INJECTION INTRAMUSCULAR; INTRAVENOUS
Status: DISCONTINUED | OUTPATIENT
Start: 2020-01-10 | End: 2020-01-10 | Stop reason: HOSPADM

## 2020-01-10 RX ORDER — SODIUM CHLORIDE, SODIUM LACTATE, POTASSIUM CHLORIDE, CALCIUM CHLORIDE 600; 310; 30; 20 MG/100ML; MG/100ML; MG/100ML; MG/100ML
1000 INJECTION, SOLUTION INTRAVENOUS CONTINUOUS
Status: DISCONTINUED | OUTPATIENT
Start: 2020-01-10 | End: 2020-01-10 | Stop reason: HOSPADM

## 2020-01-10 RX ORDER — ALBUTEROL SULFATE 0.83 MG/ML
2.5 SOLUTION RESPIRATORY (INHALATION) AS NEEDED
Status: DISCONTINUED | OUTPATIENT
Start: 2020-01-10 | End: 2020-01-10 | Stop reason: HOSPADM

## 2020-01-10 RX ORDER — CLINDAMYCIN PHOSPHATE 900 MG/50ML
900 INJECTION INTRAVENOUS
Status: COMPLETED | OUTPATIENT
Start: 2020-01-10 | End: 2020-01-10

## 2020-01-10 RX ORDER — ONDANSETRON HYDROCHLORIDE 8 MG/1
8 TABLET, FILM COATED ORAL
Qty: 30 TAB | Refills: 0 | Status: SHIPPED | OUTPATIENT
Start: 2020-01-10

## 2020-01-10 RX ORDER — ACETAMINOPHEN 500 MG
1000 TABLET ORAL ONCE
Status: COMPLETED | OUTPATIENT
Start: 2020-01-10 | End: 2020-01-10

## 2020-01-10 RX ORDER — MIDAZOLAM HYDROCHLORIDE 1 MG/ML
2 INJECTION, SOLUTION INTRAMUSCULAR; INTRAVENOUS
Status: DISCONTINUED | OUTPATIENT
Start: 2020-01-10 | End: 2020-01-10 | Stop reason: HOSPADM

## 2020-01-10 RX ORDER — OXYCODONE AND ACETAMINOPHEN 5; 325 MG/1; MG/1
1 TABLET ORAL
Qty: 30 TAB | Refills: 0 | Status: SHIPPED | OUTPATIENT
Start: 2020-01-10 | End: 2020-01-15

## 2020-01-10 RX ORDER — HYDROMORPHONE HYDROCHLORIDE 2 MG/ML
0.5 INJECTION, SOLUTION INTRAMUSCULAR; INTRAVENOUS; SUBCUTANEOUS
Status: DISCONTINUED | OUTPATIENT
Start: 2020-01-10 | End: 2020-01-10 | Stop reason: HOSPADM

## 2020-01-10 RX ORDER — BUPIVACAINE HYDROCHLORIDE 5 MG/ML
INJECTION, SOLUTION EPIDURAL; INTRACAUDAL AS NEEDED
Status: DISCONTINUED | OUTPATIENT
Start: 2020-01-10 | End: 2020-01-10 | Stop reason: HOSPADM

## 2020-01-10 RX ORDER — LIDOCAINE HYDROCHLORIDE 20 MG/ML
INJECTION, SOLUTION EPIDURAL; INFILTRATION; INTRACAUDAL; PERINEURAL AS NEEDED
Status: DISCONTINUED | OUTPATIENT
Start: 2020-01-10 | End: 2020-01-10 | Stop reason: HOSPADM

## 2020-01-10 RX ORDER — LIDOCAINE HYDROCHLORIDE 10 MG/ML
0.1 INJECTION INFILTRATION; PERINEURAL AS NEEDED
Status: DISCONTINUED | OUTPATIENT
Start: 2020-01-10 | End: 2020-01-10 | Stop reason: HOSPADM

## 2020-01-10 RX ADMIN — SODIUM CHLORIDE, SODIUM LACTATE, POTASSIUM CHLORIDE, AND CALCIUM CHLORIDE 1000 ML: 600; 310; 30; 20 INJECTION, SOLUTION INTRAVENOUS at 12:10

## 2020-01-10 RX ADMIN — PROPOFOL 30 MG: 10 INJECTION, EMULSION INTRAVENOUS at 13:29

## 2020-01-10 RX ADMIN — ACETAMINOPHEN 1000 MG: 500 TABLET, FILM COATED ORAL at 12:10

## 2020-01-10 RX ADMIN — PROPOFOL 50 MG: 10 INJECTION, EMULSION INTRAVENOUS at 13:22

## 2020-01-10 RX ADMIN — INSULIN LISPRO 5 UNITS: 100 INJECTION, SOLUTION INTRAVENOUS; SUBCUTANEOUS at 12:09

## 2020-01-10 RX ADMIN — PROPOFOL 100 MCG/KG/MIN: 10 INJECTION, EMULSION INTRAVENOUS at 13:22

## 2020-01-10 RX ADMIN — CLINDAMYCIN PHOSPHATE 900 MG: 900 INJECTION, SOLUTION INTRAVENOUS at 13:15

## 2020-01-10 RX ADMIN — LIDOCAINE HYDROCHLORIDE 40 MG: 20 INJECTION, SOLUTION EPIDURAL; INFILTRATION; INTRACAUDAL; PERINEURAL at 13:22

## 2020-01-10 RX ADMIN — SODIUM CHLORIDE, SODIUM LACTATE, POTASSIUM CHLORIDE, AND CALCIUM CHLORIDE: 600; 310; 30; 20 INJECTION, SOLUTION INTRAVENOUS at 13:18

## 2020-01-10 RX ADMIN — PROPOFOL 30 MG: 10 INJECTION, EMULSION INTRAVENOUS at 13:27

## 2020-01-10 NOTE — ANESTHESIA PREPROCEDURE EVALUATION
Relevant Problems   No relevant active problems       Anesthetic History               Review of Systems / Medical History  Patient summary reviewed and pertinent labs reviewed    Pulmonary    COPD: mild    Sleep apnea (30 pk years; Quit 2014)           Neuro/Psych     seizures (After fall)         Cardiovascular    Hypertension: well controlled      CHF (45%-50%)  Dysrhythmias (SSS) : atrial fibrillation  CAD and PAD (Non healing ulcer)    Exercise tolerance: >4 METS  Comments: Denies CP, SOB or changes in functional status.    GI/Hepatic/Renal     GERD: well controlled           Endo/Other    Diabetes: well controlled, type 2, using insulin  Hypothyroidism: well controlled       Other Findings              Physical Exam    Airway  Mallampati: II  TM Distance: 4 - 6 cm  Neck ROM: normal range of motion   Mouth opening: Normal     Cardiovascular    Rhythm: regular  Rate: normal      Pertinent negatives: No murmur   Dental    Dentition: Edentulous     Pulmonary  Breath sounds clear to auscultation               Abdominal  GI exam deferred       Other Findings            Anesthetic Plan    ASA: 3  Anesthesia type: total IV anesthesia          Induction: Intravenous  Anesthetic plan and risks discussed with: Patient      5u lispro pre-op

## 2020-01-10 NOTE — ANESTHESIA POSTPROCEDURE EVALUATION
Procedure(s):  RIGHT HEEL EXCISION/ DEBRIDEMENT FULL THICKNESS  HEEL ULCER W/ APPLICATION OF PURAPLY  .     total IV anesthesia    Anesthesia Post Evaluation      Multimodal analgesia: multimodal analgesia used between 6 hours prior to anesthesia start to PACU discharge  Patient location during evaluation: PACU  Patient participation: complete - patient participated  Level of consciousness: awake  Pain management: adequate  Airway patency: patent  Anesthetic complications: no  Cardiovascular status: acceptable  Respiratory status: acceptable  Hydration status: acceptable  Post anesthesia nausea and vomiting:  none      Vitals Value Taken Time   /60 1/10/2020  2:04 PM   Temp 36.9 °C (98.4 °F) 1/10/2020  2:04 PM   Pulse 62 1/10/2020  2:41 PM   Resp 15 1/10/2020  2:06 PM   SpO2 95 % 1/10/2020  2:41 PM

## 2020-01-10 NOTE — BRIEF OP NOTE
BRIEF OPERATIVE NOTE    Date of Procedure: 1/10/2020   Preoperative Diagnosis: Foot ulcer due to secondary DM ([facility] [E13.621, L97.509]  Postoperative Diagnosis: Foot ulcer due to [facility] ([facility] [F37.012, L97.509]    Procedure(s):  RIGHT HEEL EXCISION/ DEBRIDEMENT FULL THICKNESS  HEEL ULCER W/ APPLICATION OF PURAPLY LOCAL W/ MAC   Surgeon(s) and Role:     Martin Lizarraga MD - Primary         Surgical Assistant: none    Surgical Staff:  Circ-1: Chris Rivera RN  Circ-Relief: Nova Roca RN  Scrub Tech-1: Gladys Sung  Event Time In Time Out   Incision Start 1330    Incision Close 1348      Anesthesia: MAC   Estimated Blood Loss: <5ml  Specimens: * No specimens in log *   Findings: fatty necrosis   Complications: none  Implants: * No implants in log *

## 2020-01-10 NOTE — H&P
Patient: Sp Aguilar MRN: [id number]  SSN: [id number]    YOB: 1950  Age: 81 y.o. Sex: male      History and Physical    Sp Aguilar is a 81 y.o. male having Procedure(s):  RIGHT HEEL EXCISION/ DEBRIDEMENT FULL THICKNESS  HEEL ULCER W/ APPLICATION OF PURAPLY LOCAL W/ MAC PACEMAKER. Allergies: Allergies   Allergen Reactions    Codeine Nausea Only    Contrast Agent [Iodine] Other (comments)     Patient can not get IV dye due to renal transplant.     Hydrocodone-Acetaminophen Nausea Only    Opioids - Morphine Analogues Nausea Only        Chief Complaint:heel pain    History of Present Illness: 82 yo WM diabetic with heel ulcer non-healing presents for graft with Dr. Maria Fernanda Tariq    Past Medical History:   Diagnosis Date    Anemia     Arrhythmia     post CABG A fib/flutter leading to sick sinus syndrome- pace maker    Brain bleed ([address] 2018    resulting from fall with head injury while on coumadin    Bronchial pneumonia     CAD (coronary artery disease) 2015    CABG    Chronic kidney disease     renal transplant-2016    Chronic obstructive pulmonary disease (HCC)     mild    Chronic pain     GERD (gastroesophageal reflux disease)     controlled with medication    Hypertension     NSVT (nonsustained ventricular tachycardia) (HCC)     PAD (peripheral artery disease) ([address]     Renal transplant recipient     Respiratory failure ([address]     post CABG requiring tracheostomy    Seizures ([address]     following brain bleed    Sleep apnea     no c pap    Thyroid disease     hypo    Type 2 diabetes mellitus ([address]     avg , HGB A1C 7.5, symptoms hypoglycemia at 52      Past Surgical History:   Procedure Laterality Date    CARDIAC SURG PROCEDURE UNLIST  2015    cabg x4    HX CATARACT REMOVAL Bilateral 2014    HX COLONOSCOPY      HX HEART CATHETERIZATION  2015    HX HERNIA REPAIR  6375    umbilical hernia repair    HX PACEMAKER  2015    dual chamber medtronic    HX TRANSPLANT Right 2016    renal transplant    HX VASCULAR ACCESS      peritoneal dialysis catheter- removed 2017    VASCULAR SURGERY PROCEDURE UNLIST  2019    right lower extremity arteriogram/stent/balloon      Family History   Problem Relation Age of Onset    Stroke Mother     Cancer Father         pancreatic      Social History     Tobacco Use    Smoking status: Former Smoker     Packs/day: 1.00     Years: 30.00     Pack years: 30.00     Last attempt to quit:      Years since quittin.0    Smokeless tobacco: Never Used   Substance Use Topics    Alcohol use: Yes     Comment: rarely        Prior to Admission medications    Medication Sig Start Date End Date Taking? Authorizing Provider   oxyCODONE-acetaminophen (PERCOCET) 5-325 mg per tablet Take  by mouth every six (6) hours as needed for Pain. Yes Provider, Historical   levothyroxine (SYNTHROID) 100 mcg tablet Take 100 mcg by mouth Daily (before breakfast). Yes Provider, Historical   omeprazole (PRILOSEC) 40 mg capsule Take 40 mg by mouth daily. Yes Provider, Historical   tacrolimus (PROGRAF) 1 mg capsule Take 2 mg by mouth every twelve (12) hours. Yes Provider, Historical   mycophenolate mofetil (CELLCEPT) 250 mg capsule Take 500 mg by mouth two (2) times a day. Yes Provider, Historical   predniSONE (DELTASONE) 20 mg tablet Take 5 mg by mouth daily (with breakfast). Yes Provider, Historical   amLODIPine (NORVASC) 10 mg tablet Take 5 mg by mouth daily. Yes Provider, Historical   atorvastatin (LIPITOR) 40 mg tablet Take 40 mg by mouth nightly. Yes Provider, Historical   amoxicillin-clavulanate (AUGMENTIN) 875-125 mg per tablet Take  by mouth every twelve (12) hours. Yes Provider, Historical   ondansetron hcl (ZOFRAN) 8 mg tablet Take 8 mg by mouth every eight (8) hours as needed for Nausea. Yes Provider, Historical   folic acid 352 mcg tablet Take 800 mcg by mouth daily.    Yes Provider, Historical   cholecalciferol (VITAMIN D3) (5000 Units/125 mcg) tab tablet Take 5,000 Units by mouth daily. Yes Provider, Historical   guaiFENesin (MUCINEX) 1,200 mg Ta12 ER tablet Take 1,200 mg by mouth as needed for Congestion. Yes Provider, Historical   budesonide-formoterol (SYMBICORT) 160-4.5 mcg/actuation HFAA Take 2 Puffs by inhalation two (2) times a day. Yes Provider, Historical   albuterol (VENTOLIN HFA) 90 mcg/actuation inhaler Take  by inhalation every four (4) hours as needed. Yes Provider, Historical   melatonin 5 mg tablet Take 10 mg by mouth nightly. Yes Provider, Historical   insulin degludec (TRESIBA FLEXTOUCH U-200) 200 unit/mL (3 mL) inpn 30 Units by SubCUTAneous route daily. Yes Provider, Historical   insulin aspart U-100 (NOVOLOG FLEXPEN U-100 INSULIN) 100 unit/mL (3 mL) inpn by SubCUTAneous route. Sliding scale   Yes Provider, Historical   clopidogrel (PLAVIX) 75 mg tab Take 1 Tab by mouth daily. Indications: blood clot prevention following percutaneous coronary intervention 12/26/19   Humberto Whaley MD   warfarin (COUMADIN) 3 mg tablet Take 3 mg by mouth nightly. Takes 6 mg Sunday and Thursday. Takes 4.5 mg Monday, Wednesday, Friday, Saturday.      Provider, Historical        Visit Vitals  /87 (BP 1 Location: Right arm)   Pulse 87   Temp 36.9 °C (98.4 °F)   Resp 18   Ht 6' 1\" (1.854 m)   Wt 93.4 kg (206 lb)   SpO2 97%   BMI 27.18 kg/m²        Assessment and Plan:   Tania Coon is a 81 y.o. male having Procedure(s):  RIGHT HEEL EXCISION/ DEBRIDEMENT FULL THICKNESS  HEEL ULCER W/ APPLICATION OF PURAPLY LOCAL W/ MAC PACEMAKER for heel pain    Preanesthesia Evaluation     Last edited 01/10/20 1201 by Britton Claire MD  Date of Service 01/10/20 1153             Relevant Problems   No relevant active problems       Anesthetic History               Review of Systems / Medical History  Patient summary reviewed and pertinent labs reviewed    Pulmonary    COPD: mild    Sleep apnea (30 pk years; Quit 2014)           Neuro/Psych     seizures (After fall)         Cardiovascular    Hypertension: well controlled      CHF (45%-50%)  Dysrhythmias (SSS) : atrial fibrillation  CAD and PAD (Non healing ulcer)    Exercise tolerance: >4 METS  Comments: Denies CP, SOB or changes in functional status.    GI/Hepatic/Renal     GERD: well controlled           Endo/Other    Diabetes: well controlled, type 2, using insulin  Hypothyroidism: well controlled       Other Findings              Physical Exam    Airway  Mallampati: II  TM Distance: 4 - 6 cm  Neck ROM: normal range of motion   Mouth opening: Normal     Cardiovascular    Rhythm: regular  Rate: normal      Pertinent negatives: No murmur   Dental    Dentition: Edentulous     Pulmonary  Breath sounds clear to auscultation               Abdominal  GI exam deferred       Other Findings            Anesthetic Plan    ASA: 3  Anesthesia type: total IV anesthesia          Induction: Intravenous  Anesthetic plan and risks discussed with: Patient      5u lispro pre-op          Preanesthesia evaluation performed by Drea Smith MD    Revision History       Date/Time User Provider Type Action    > 01/10/20 1201 Shakira Colin MD Physician Addend     01/10/20 1154 Drea Smith MD Physician Sign                  Signed By: William Tamez MD     January 10, 2020

## 2020-01-10 NOTE — DISCHARGE INSTRUCTIONS
Per Dr Ann Marie Novoa:  -wear boot when walking  -minimal weight bearing  -keep boot clean and dry    After general anesthesia or intravenous sedation, for 24 hours or while taking prescription Narcotics:  · Limit your activities  · A responsible adult needs to be with you for the next 24 hours  · Do not drive and operate hazardous machinery  · Do not make important personal or business decisions  · Do  not drink alcoholic beverages  · If you have not urinated within 8 hours after discharge, please contact your surgeon on call. · If you have sleep apnea and have a CPAP machine, please use it for all naps and sleeping. · Please use caution when taking narcotics and any of your home medications that may cause drowsiness. *  Please give a list of your current medications to your Primary Care Provider. *  Please update this list whenever your medications are discontinued, doses are      changed, or new medications (including over-the-counter products) are added. *  Please carry medication information at all times in case of emergency situations. These are general instructions for a healthy lifestyle:  No smoking/ No tobacco products/ Avoid exposure to second hand smoke  Surgeon General's Warning:  Quitting smoking now greatly reduces serious risk to your health. Obesity, smoking, and sedentary lifestyle greatly increases your risk for illness  A healthy diet, regular physical exercise & weight monitoring are important for maintaining a healthy lifestyle    You may be retaining fluid if you have a history of heart failure or if you experience any of the following symptoms:  Weight gain of 3 pounds or more overnight or 5 pounds in a week, increased swelling in our hands or feet or shortness of breath while lying flat in bed. Please call your doctor as soon as you notice any of these symptoms; do not wait until your next office visit.

## 2020-01-16 NOTE — OP NOTES
[facility]  OPERATIVE REPORT    Name:  Houston Steele  MR#:  [id number]  :  1950  ACCOUNT #:  [de-identified]  DATE OF SERVICE:  01/10/2020    PREOPERATIVE DIAGNOSIS:  Diabetes with foot ulcer. POSTOPERATIVE DIAGNOSIS:  Diabetes with foot ulcer. PROCEDURE PERFORMED:  Excisional debridement, full thickness, right heel ulcer with application of PuraPly AM (05684). SURGEON:  Forest Ballesteros MD    ASSISTANT:  None. ANESTHESIA:  Mac with local block    COMPLICATIONS:  None. SPECIMENS REMOVED:  No specimen sent to pathology    IMPLANTS:  See materials    ESTIMATED BLOOD LOSS:  Less than 5 mL. HEMOSTASIS:  Anatomic dissection. MATERIALS:  PuraPly AM and dry sterile dressing. INJECTABLES:  Preoperative injection with 1% lidocaine plain, postoperative block with 0.5% Marcaine plain. INDICATIONS FOR SURGERY:  The patient is a pleasant diabetic male with a history of kidney transplant. He is well known to my office. The patient stepped on a piece of glass almost 2 months ago, prior to , and went to [address] [place] [address] where an incision exploratory procedure and removal of foreign body was done under local anesthesia at the facility. The patient's wounds failed to heal.  He presented to my office for followup with complaints of a nonhealing wound. I sent the patient to Dr. Aisha Alvarez for vascular consult as his pedal pulses were weak and specifically with his history of comorbidities. The patient was found to have vascular disease on that side, which would explain why the wound was not healing properly. The patient had a vascular intervention with Dr. Johnson. After a good blood flow was restored, I offered surgical debridement with application of grafts in the operating room. Risks and benefits of the procedure were discussed in detail. The patient understands he is at risk of losing his foot.   Consents were signed and the patient was brought to the operating room for the procedure as an outpatient. PROCEDURE:  The patient was brought to the operating room and placed on the operating table in supine position. After MAC anesthesia was obtained, a local injection utilizing at least 10 mL of 1% lidocaine plain was injected around the wound. Next, the foot was then scrubbed, prepped, and draped in the usual aseptic manner. The foot was elevated for exsanguination; however, the pneumatic ankle tourniquet was not inflated. Next, attention was directed to the ulcer on the plantar and lateral aspect of the patient's heel. The incision of the ulcer was debrided full thickness through the subcutaneous tissue of all necrotic fat and fibrotic tissue. The wound margins were excised. Pressure was used for hemostasis. The wound was then flushed with copious amounts of sterile normal saline. Next, PuraPly graft was applied to the wound, secured with benzoin and Steri-Strips. The wound measures 3 x 2.5 cm x 0.2 cm deep. Next, an ABD pad, dry sterile dressing, and Adaptic were applied, followed by Ace wrap. The patient tolerated the procedure and anesthesia well. He was transferred from the operating room to PACU with vital signs stable and neurovascular status intact to the toes of the right foot. He was discharged with both written and oral postoperative instructions.       Cristel Carlisle MD      [email or web address]  D:  01/15/2020 17:16  T:  01/15/2020 23:02  JOB #:  [id number]

## 2020-03-12 ENCOUNTER — ANESTHESIA EVENT (OUTPATIENT)
Dept: SURGERY | Age: 70
End: 2020-03-12
Payer: MEDICARE

## 2020-03-13 ENCOUNTER — HOSPITAL ENCOUNTER (OUTPATIENT)
Age: 70
Setting detail: OUTPATIENT SURGERY
Discharge: HOME | End: 2020-03-13
Attending: PODIATRIST | Admitting: PODIATRIST
Payer: MEDICARE

## 2020-03-13 ENCOUNTER — ANESTHESIA (OUTPATIENT)
Dept: SURGERY | Age: 70
End: 2020-03-13
Payer: MEDICARE

## 2020-03-13 ENCOUNTER — APPOINTMENT (OUTPATIENT)
Dept: GENERAL RADIOLOGY | Age: 70
End: 2020-03-13
Attending: PODIATRIST
Payer: MEDICARE

## 2020-03-13 VITALS
HEART RATE: 66 BPM | BODY MASS INDEX: 27.18 KG/M2 | SYSTOLIC BLOOD PRESSURE: 158 MMHG | OXYGEN SATURATION: 98 % | DIASTOLIC BLOOD PRESSURE: 83 MMHG | WEIGHT: 206 LBS | RESPIRATION RATE: 15 BRPM | TEMPERATURE: 97.7 F

## 2020-03-13 DIAGNOSIS — Z98.890 S/P FOOT SURGERY, RIGHT: Primary | ICD-10-CM

## 2020-03-13 LAB
GLUCOSE BLD STRIP.AUTO-MCNC: 85 MG/DL (ref 65–100)
GLUCOSE BLD STRIP.AUTO-MCNC: 87 MG/DL (ref 65–100)
INR BLD: 1.5 (ref 0.9–1.2)
PT BLD: 17.7 SECS (ref 9.6–11.6)

## 2020-03-13 PROCEDURE — 87106 FUNGI IDENTIFICATION YEAST: CPT

## 2020-03-13 PROCEDURE — 87075 CULTR BACTERIA EXCEPT BLOOD: CPT

## 2020-03-13 PROCEDURE — 76210000063 HC OR PH I REC FIRST 0.5 HR: Performed by: PODIATRIST

## 2020-03-13 PROCEDURE — 74011250636 HC RX REV CODE- 250/636: Performed by: NURSE ANESTHETIST, CERTIFIED REGISTERED

## 2020-03-13 PROCEDURE — 85610 PROTHROMBIN TIME: CPT

## 2020-03-13 PROCEDURE — 76210000021 HC REC RM PH II 0.5 TO 1 HR: Performed by: PODIATRIST

## 2020-03-13 PROCEDURE — 74011000250 HC RX REV CODE- 250: Performed by: PODIATRIST

## 2020-03-13 PROCEDURE — 74011250636 HC RX REV CODE- 250/636: Performed by: PODIATRIST

## 2020-03-13 PROCEDURE — 82962 GLUCOSE BLOOD TEST: CPT

## 2020-03-13 PROCEDURE — 74011250637 HC RX REV CODE- 250/637: Performed by: ANESTHESIOLOGY

## 2020-03-13 PROCEDURE — 88311 DECALCIFY TISSUE: CPT

## 2020-03-13 PROCEDURE — 88305 TISSUE EXAM BY PATHOLOGIST: CPT

## 2020-03-13 PROCEDURE — 76060000032 HC ANESTHESIA 0.5 TO 1 HR: Performed by: PODIATRIST

## 2020-03-13 PROCEDURE — 87153 DNA/RNA SEQUENCING: CPT

## 2020-03-13 PROCEDURE — 74011250636 HC RX REV CODE- 250/636: Performed by: ANESTHESIOLOGY

## 2020-03-13 PROCEDURE — 87186 SC STD MICRODIL/AGAR DIL: CPT

## 2020-03-13 PROCEDURE — 87205 SMEAR GRAM STAIN: CPT

## 2020-03-13 PROCEDURE — 76010000138 HC OR TIME 0.5 TO 1 HR: Performed by: PODIATRIST

## 2020-03-13 PROCEDURE — 87077 CULTURE AEROBIC IDENTIFY: CPT

## 2020-03-13 PROCEDURE — 87076 CULTURE ANAEROBE IDENT EACH: CPT

## 2020-03-13 RX ORDER — DIPHENHYDRAMINE HYDROCHLORIDE 50 MG/ML
12.5 INJECTION, SOLUTION INTRAMUSCULAR; INTRAVENOUS ONCE
Status: DISCONTINUED | OUTPATIENT
Start: 2020-03-13 | End: 2020-03-13 | Stop reason: HOSPADM

## 2020-03-13 RX ORDER — OXYCODONE AND ACETAMINOPHEN 5; 325 MG/1; MG/1
1 TABLET ORAL
Qty: 12 TAB | Refills: 0 | Status: SHIPPED | OUTPATIENT
Start: 2020-03-13 | End: 2020-03-16

## 2020-03-13 RX ORDER — PROPOFOL 10 MG/ML
INJECTION, EMULSION INTRAVENOUS
Status: DISCONTINUED | OUTPATIENT
Start: 2020-03-13 | End: 2020-03-13 | Stop reason: HOSPADM

## 2020-03-13 RX ORDER — OXYCODONE HYDROCHLORIDE 5 MG/1
10 TABLET ORAL
Status: DISCONTINUED | OUTPATIENT
Start: 2020-03-13 | End: 2020-03-13 | Stop reason: HOSPADM

## 2020-03-13 RX ORDER — ACETAMINOPHEN 500 MG
1000 TABLET ORAL ONCE
Status: COMPLETED | OUTPATIENT
Start: 2020-03-13 | End: 2020-03-13

## 2020-03-13 RX ORDER — LIDOCAINE HYDROCHLORIDE 10 MG/ML
0.1 INJECTION INFILTRATION; PERINEURAL AS NEEDED
Status: DISCONTINUED | OUTPATIENT
Start: 2020-03-13 | End: 2020-03-13 | Stop reason: HOSPADM

## 2020-03-13 RX ORDER — MIDAZOLAM HYDROCHLORIDE 1 MG/ML
2 INJECTION, SOLUTION INTRAMUSCULAR; INTRAVENOUS ONCE
Status: DISCONTINUED | OUTPATIENT
Start: 2020-03-13 | End: 2020-03-13 | Stop reason: HOSPADM

## 2020-03-13 RX ORDER — NALOXONE HYDROCHLORIDE 0.4 MG/ML
0.1 INJECTION, SOLUTION INTRAMUSCULAR; INTRAVENOUS; SUBCUTANEOUS AS NEEDED
Status: DISCONTINUED | OUTPATIENT
Start: 2020-03-13 | End: 2020-03-13 | Stop reason: HOSPADM

## 2020-03-13 RX ORDER — HYDROMORPHONE HYDROCHLORIDE 2 MG/ML
0.5 INJECTION, SOLUTION INTRAMUSCULAR; INTRAVENOUS; SUBCUTANEOUS
Status: DISCONTINUED | OUTPATIENT
Start: 2020-03-13 | End: 2020-03-13 | Stop reason: HOSPADM

## 2020-03-13 RX ORDER — FENTANYL CITRATE 50 UG/ML
100 INJECTION, SOLUTION INTRAMUSCULAR; INTRAVENOUS ONCE
Status: DISCONTINUED | OUTPATIENT
Start: 2020-03-13 | End: 2020-03-13 | Stop reason: HOSPADM

## 2020-03-13 RX ORDER — SODIUM CHLORIDE, SODIUM LACTATE, POTASSIUM CHLORIDE, CALCIUM CHLORIDE 600; 310; 30; 20 MG/100ML; MG/100ML; MG/100ML; MG/100ML
75 INJECTION, SOLUTION INTRAVENOUS CONTINUOUS
Status: DISCONTINUED | OUTPATIENT
Start: 2020-03-13 | End: 2020-03-13 | Stop reason: HOSPADM

## 2020-03-13 RX ORDER — CLINDAMYCIN PHOSPHATE 900 MG/50ML
900 INJECTION INTRAVENOUS
Status: COMPLETED | OUTPATIENT
Start: 2020-03-13 | End: 2020-03-13

## 2020-03-13 RX ORDER — LIDOCAINE HYDROCHLORIDE 10 MG/ML
INJECTION INFILTRATION; PERINEURAL AS NEEDED
Status: DISCONTINUED | OUTPATIENT
Start: 2020-03-13 | End: 2020-03-13 | Stop reason: HOSPADM

## 2020-03-13 RX ORDER — OXYCODONE HYDROCHLORIDE 5 MG/1
5 TABLET ORAL
Status: COMPLETED | OUTPATIENT
Start: 2020-03-13 | End: 2020-03-13

## 2020-03-13 RX ORDER — ONDANSETRON 2 MG/ML
4 INJECTION INTRAMUSCULAR; INTRAVENOUS ONCE
Status: DISCONTINUED | OUTPATIENT
Start: 2020-03-13 | End: 2020-03-13 | Stop reason: HOSPADM

## 2020-03-13 RX ORDER — PROPOFOL 10 MG/ML
INJECTION, EMULSION INTRAVENOUS AS NEEDED
Status: DISCONTINUED | OUTPATIENT
Start: 2020-03-13 | End: 2020-03-13 | Stop reason: HOSPADM

## 2020-03-13 RX ORDER — MIDAZOLAM HYDROCHLORIDE 1 MG/ML
2 INJECTION, SOLUTION INTRAMUSCULAR; INTRAVENOUS
Status: DISCONTINUED | OUTPATIENT
Start: 2020-03-13 | End: 2020-03-13 | Stop reason: HOSPADM

## 2020-03-13 RX ORDER — SODIUM CHLORIDE, SODIUM LACTATE, POTASSIUM CHLORIDE, CALCIUM CHLORIDE 600; 310; 30; 20 MG/100ML; MG/100ML; MG/100ML; MG/100ML
100 INJECTION, SOLUTION INTRAVENOUS CONTINUOUS
Status: DISCONTINUED | OUTPATIENT
Start: 2020-03-13 | End: 2020-03-13 | Stop reason: HOSPADM

## 2020-03-13 RX ORDER — BUPIVACAINE HYDROCHLORIDE 5 MG/ML
INJECTION, SOLUTION EPIDURAL; INTRACAUDAL AS NEEDED
Status: DISCONTINUED | OUTPATIENT
Start: 2020-03-13 | End: 2020-03-13 | Stop reason: HOSPADM

## 2020-03-13 RX ADMIN — CLINDAMYCIN PHOSPHATE 900 MG: 900 INJECTION, SOLUTION INTRAVENOUS at 10:10

## 2020-03-13 RX ADMIN — SODIUM CHLORIDE, SODIUM LACTATE, POTASSIUM CHLORIDE, AND CALCIUM CHLORIDE 100 ML/HR: 600; 310; 30; 20 INJECTION, SOLUTION INTRAVENOUS at 08:41

## 2020-03-13 RX ADMIN — ACETAMINOPHEN 1000 MG: 500 TABLET, FILM COATED ORAL at 08:42

## 2020-03-13 RX ADMIN — PROPOFOL 30 MG: 10 INJECTION, EMULSION INTRAVENOUS at 10:22

## 2020-03-13 RX ADMIN — OXYCODONE HYDROCHLORIDE 5 MG: 5 TABLET ORAL at 11:50

## 2020-03-13 RX ADMIN — PROPOFOL 40 MG: 10 INJECTION, EMULSION INTRAVENOUS at 10:19

## 2020-03-13 RX ADMIN — PROPOFOL 200 MCG/KG/MIN: 10 INJECTION, EMULSION INTRAVENOUS at 10:19

## 2020-03-13 NOTE — ANESTHESIA PREPROCEDURE EVALUATION
Relevant Problems   No relevant active problems       Anesthetic History               Review of Systems / Medical History  Patient summary reviewed and pertinent labs reviewed    Pulmonary    COPD: mild    Sleep apnea (30 pk years; Quit 2014)           Neuro/Psych     seizures (After fall)         Cardiovascular    Hypertension: well controlled      CHF (45%-50%)  Dysrhythmias (SSS) : atrial fibrillation  Pacemaker (No ICD), CAD and PAD (Non healing ulcer)    Exercise tolerance: >4 METS  Comments: Denies CP, SOB or changes in functional status.    GI/Hepatic/Renal     GERD: well controlled    Renal disease (Renal Tx)       Endo/Other    Diabetes: well controlled, type 2, using insulin  Hypothyroidism: well controlled       Other Findings            Physical Exam    Airway  Mallampati: II  TM Distance: 4 - 6 cm  Neck ROM: normal range of motion   Mouth opening: Normal     Cardiovascular    Rhythm: regular  Rate: normal      Pertinent negatives: No murmur   Dental    Dentition: Edentulous     Pulmonary  Breath sounds clear to auscultation               Abdominal  GI exam deferred       Other Findings            Anesthetic Plan    ASA: 3  Anesthesia type: total IV anesthesia          Induction: Intravenous  Anesthetic plan and risks discussed with: Patient

## 2020-03-13 NOTE — DISCHARGE INSTRUCTIONS
Follow up: Wednesday March 18. Call office to confirm or with any questions regarding f/u appt. TYPICAL SIDE EFFECTS OF PAIN MEDICATION:  *    Constipation: Drink lots of fluids. Over the counter stool softener if needed. *    Nausea: Take pain medication with food. Call your doctor with persistent nausea. ACTIVITY  · As tolerated and as directed by your doctor. · Bathe or shower as directed by your doctor. DIET  · Day of surgery: Clear liquids until no nausea or vomiting; small portion, light diet [place] foods (ex: baked chicken, plain rice, grits, scrambled eggs, toast). Nothing greasy, fried or spicy today. · Advance to regular diet on second day, unless your doctor orders otherwise. · If nausea and vomiting continues, call your doctor. PAIN  · Take pain medication as directed by your doctor. · DO NOT take aspirin or blood thinners unless directed by your doctor. CALL YOUR DOCTOR IF    s Call your doctor if pain is NOT relieved by medication.   s Excessive bleeding that does not stop after holding pressure over the area  · Temperature of 101 degrees F or above  · Excessive redness, swelling or bruising, and/ or green or yellow, smelly discharge from incision    AFTER ANESTHESIA   · For the first 24 hours: DO NOT Drive, Drink alcoholic beverages, or Make important decisions. · Be aware of dizziness following anesthesia and while taking pain medication. DISCHARGE SUMMARY from Nurse    PATIENT INSTRUCTIONS:    After general anesthesia or intravenous sedation, for 24 hours or while taking prescription Narcotics:  · Limit your activities  · Do not drive and operate hazardous machinery  · Do not make important personal or business decisions  · Do  not drink alcoholic beverages  · If you have not urinated within 8 hours after discharge, please contact your surgeon on call. *  Please give a list of your current medications to your Primary Care Provider.     *  Please update this list whenever your medications are discontinued, doses are      changed, or new medications (including over-the-counter products) are added. *  Please carry medication information at all times in case of emergency situations. Preventing Infection at Home  We care about preventing infection and avoiding the spread of germs - not only when you are in the hospital but also when you return home. When you return home from the hospital, its important to take the following steps to help prevent infection and avoid spreading germs that could infect you and others. Ask everyone in your home to follow these guidelines, too. Clean Your Hands  · Clean your hands whenever your hands are visibly dirty, before you eat, before or after touching your mouth, nose or eyes, and before preparing food. Clean them after contact with body fluids, using the restroom, touching animals or changing diapers. · When washing hands, wet them with warm water and work up a lather. Rub hands for at least 15 seconds, then rinse them and pat them dry with a clean towel or paper towel. · When using hand sanitizers, it should take about 15 seconds to rub your hands dry. If not, you probably didnt apply enough . Cover Your Sneeze or Cough  Germs are released into the air whenever you sneeze or cough. To prevent the spread of infection:  · Turn away from other people before coughing or sneezing. · Cover your mouth or nose with a tissue when you cough or sneeze. Put the tissue in the trash. · If you dont have a tissue, cough or sneeze into your upper sleeve, not your hands. · Always clean your hands after coughing or sneezing. Care for Wounds  Your skin is your bodys first line of defense against germs, but an open wound leaves an easy way for germs to enter your body.  To prevent infection:  · Clean your hands before and after changing wound dressings, and wear gloves to change dressings if recommended by your doctor. · Take special care with IV lines or other devices inserted into the body. If you must touch them, clean your hands first.  · Follow any specific instructions from your doctor to care for your wounds. Contact your doctor if you experience any signs of infection, such as fever or increased redness at the surgical or wound site. Keep a Clean Home  · Clean or wipe commonly touched hard surfaces like door handles, sinks, tabletops, phones and TV remotes. · Use products labeled disinfectant to kill harmful bacteria and viruses. · Use a clean cloth or paper towel to clean and dry surfaces. Wiping surfaces with a dirty dishcloth, sponge or towel will only spread germs. · Never share toothbrushes, forbes, drinking glasses, utensils, razor blades, face cloths or bath towels to avoid spreading germs. · Be sure that the linens that you sleep on are clean. · Keep pets away from wounds and wash your hands after touching pets, their toys or bedding. We care about you and your health. Remember, preventing infections is a team effort between you, your family, friends and health care providers. These are general instructions for a healthy lifestyle:    No smoking/ No tobacco products/ Avoid exposure to second hand smoke    Surgeon General's Warning:  Quitting smoking now greatly reduces serious risk to your health. Obesity, smoking, and sedentary lifestyle greatly increases your risk for illness    A healthy diet, regular physical exercise & weight monitoring are important for maintaining a healthy lifestyle    You may be retaining fluid if you have a history of heart failure or if you experience any of the following symptoms:  Weight gain of 3 pounds or more overnight or 5 pounds in a week, increased swelling in our hands or feet or shortness of breath while lying flat in bed. Please call your doctor as soon as you notice any of these symptoms; do not wait until your next office visit.     Recognize signs and symptoms of STROKE:    F-face looks uneven    A-arms unable to move or move unevenly    S-speech slurred or non-existent    T-time-call 911 as soon as signs and symptoms begin-DO NOT go       Back to bed or wait to see if you get better-TIME IS BRAIN.

## 2020-03-13 NOTE — H&P
Patient: Von Jimenez MRN: [id number]  SSN: [id number]    YOB: 1950  Age: 81 y.o. Sex: male      History and Physical    Von Jimenez is a 81 y.o. male having Procedure(s):  RIGHT CALCANEUS BONE BIOPSY. Allergies: Allergies   Allergen Reactions    Codeine Nausea Only    Contrast Agent [Iodine] Other (comments)     Patient can not get IV dye due to renal transplant.     Hydrocodone-Acetaminophen Nausea Only    Opioids - Morphine Analogues Nausea Only        Chief Complaint: Diabetic Foot Ulcer    History of Present Illness: 82 yo PMhx CAD s/p CABG, Afib s/p pacemaker, DM, CRI, Gerd, COPD presents for right calcaneus bone biopsy    Past Medical History:   Diagnosis Date    Anemia     Arrhythmia     post CABG A fib/flutter leading to sick sinus syndrome- pace maker    Brain bleed ([address] 2018    resulting from fall with head injury while on coumadin    Brain bleed ([address] 05/2018    due to fall- treated with Vitamin K    Bronchial pneumonia     CAD (coronary artery disease) 2015    CABG    Carotid artery stenosis 90/68/6745    JEREMIAS <27%, LICA 98%    Chronic kidney disease     renal transplant-2016    Chronic obstructive pulmonary disease (HCC)     mild    Chronic pain     GERD (gastroesophageal reflux disease)     controlled with medication    History of echocardiogram 07/05/2019    mild concentric ventrucular hypertrophy, LVEF 45-50%, grade1 left ventricular diastolic dysfunction    Hypertension     NSVT (nonsustained ventricular tachycardia) ([address]     PAD (peripheral artery disease) ([address]     Renal transplant recipient     Respiratory failure ([address]     post CABG requiring tracheostomy    Respiratory failure, post-operative ([address]     after CABG required tracheostomy    Seizures ([address]     following brain bleed    Sleep apnea     no c pap    Thyroid disease     hypo    Type 2 diabetes mellitus ([address]     avg ,symptoms hypoglycemia at 52      Past Surgical History:   Procedure Laterality Date    CARDIAC SURG PROCEDURE UNLIST  2015    cabg x4    HX CATARACT REMOVAL Bilateral 2014    HX COLONOSCOPY      HX HEART CATHETERIZATION  2015    HX HEENT Bilateral     laser sx for bleeding due to diabetic retinopathy    HX HERNIA REPAIR  5642    umbilical hernia repair    HX PACEMAKER  2015    dual chamber medtronic    HX TRANSPLANT Right 2016    renal transplant    HX VASCULAR ACCESS      peritoneal dialysis catheter- removed 2017    VASCULAR SURGERY PROCEDURE UNLIST  2019    right lower extremity arteriogram/stent/balloon      Family History   Problem Relation Age of Onset    Stroke Mother     Cancer Father         pancreatic      Social History     Tobacco Use    Smoking status: Former Smoker     Packs/day: 1.00     Years: 30.00     Pack years: 30.00     Last attempt to quit:      Years since quittin.2    Smokeless tobacco: Never Used   Substance Use Topics    Alcohol use: Yes     Comment: rarely        Prior to Admission medications    Medication Sig Start Date End Date Taking? Authorizing Provider   aspirin 81 mg chewable tablet Take 81 mg by mouth daily. Provider, Historical   ondansetron hcl (ZOFRAN) 8 mg tablet Take 1 Tab by mouth every eight (8) hours as needed for Nausea or Vomiting. 1/10/20   Joanne Gaucher, MD   clopidogrel (PLAVIX) 75 mg tab Take 1 Tab by mouth daily. Indications: blood clot prevention following percutaneous coronary intervention 19   Jose Coon MD   levothyroxine (SYNTHROID) 100 mcg tablet Take 100 mcg by mouth Daily (before breakfast). Provider, Historical   omeprazole (PRILOSEC) 40 mg capsule Take 40 mg by mouth daily. Provider, Historical   tacrolimus (PROGRAF) 1 mg capsule Take 2 mg by mouth every twelve (12) hours. Provider, Historical   mycophenolate mofetil (CELLCEPT) 250 mg capsule Take 500 mg by mouth two (2) times a day.     Provider, Historical   predniSONE (DELTASONE) 20 mg tablet Take 5 mg by mouth daily (with breakfast). Provider, Historical   amLODIPine (NORVASC) 10 mg tablet Take 5 mg by mouth daily. Provider, Historical   atorvastatin (LIPITOR) 40 mg tablet Take 40 mg by mouth nightly. Provider, Historical   warfarin (COUMADIN) 3 mg tablet Take 3 mg by mouth nightly. Takes 6 mg Sunday and Thursday. Takes 4.5 mg Monday, Wednesday, Friday, Saturday. Provider, Historical   folic acid 014 mcg tablet Take 800 mcg by mouth daily. Provider, Historical   cholecalciferol (VITAMIN D3) (5000 Units/125 mcg) tab tablet Take 5,000 Units by mouth daily. Provider, Historical   guaiFENesin (MUCINEX) 1,200 mg Ta12 ER tablet Take 1,200 mg by mouth as needed for Congestion. Provider, Historical   budesonide-formoterol (SYMBICORT) 160-4.5 mcg/actuation HFAA Take 2 Puffs by inhalation two (2) times a day. Provider, Historical   albuterol (VENTOLIN HFA) 90 mcg/actuation inhaler Take  by inhalation every four (4) hours as needed. Provider, Historical   melatonin 5 mg tablet Take 10 mg by mouth nightly. Provider, Historical   insulin degludec [place] FlexTouch U-200) 200 unit/mL (3 mL) inpn 30 Units by SubCUTAneous route daily. Provider, Historical   insulin aspart U-100 (NOVOLOG FLEXPEN U-100 INSULIN) 100 unit/mL (3 mL) inpn by SubCUTAneous route.  Sliding scale    Provider, Historical        Visit Vitals  /77 (BP 1 Location: Right arm, BP Patient Position: Sitting)   Pulse 81   Temp 36.8 °C (98.3 °F)   Resp 18   Wt 93.4 kg (206 lb)   SpO2 96%   BMI 27.18 kg/m²        Assessment and Plan:   Yves Rivera is a 81 y.o. male having Procedure(s):  RIGHT CALCANEUS BONE BIOPSY for Diabetic foot ulcer    Preanesthesia Evaluation     Last edited 03/13/20 0831 by Rose Marie Burgess MD  Date of Service 03/13/20 0830             Relevant Problems   No relevant active problems       Anesthetic History               Review of Systems / Medical History  Patient summary reviewed and pertinent labs reviewed    Pulmonary    COPD: mild    Sleep apnea (30 pk years; Quit 2014)           Neuro/Psych     seizures (After fall)         Cardiovascular    Hypertension: well controlled      CHF (45%-50%)  Dysrhythmias (SSS) : atrial fibrillation  Pacemaker (No ICD), CAD and PAD (Non healing ulcer)    Exercise tolerance: >4 METS  Comments: Denies CP, SOB or changes in functional status.    GI/Hepatic/Renal     GERD: well controlled           Endo/Other    Diabetes: well controlled, type 2, using insulin  Hypothyroidism: well controlled       Other Findings              Physical Exam    Airway  Mallampati: II  TM Distance: 4 - 6 cm  Neck ROM: normal range of motion   Mouth opening: Normal     Cardiovascular    Rhythm: regular  Rate: normal      Pertinent negatives: No murmur   Dental    Dentition: Edentulous     Pulmonary  Breath sounds clear to auscultation               Abdominal  GI exam deferred       Other Findings            Anesthetic Plan    ASA: 3  Anesthesia type: total IV anesthesia          Induction: Intravenous  Anesthetic plan and risks discussed with: Patient               Preanesthesia evaluation performed by Andi Chase MD            Signed By: Alejandra Peralta MD     March 13, 2020

## 2020-03-13 NOTE — ANESTHESIA POSTPROCEDURE EVALUATION
Procedure(s):  RIGHT CALCANEUS BONE BIOPSY. total IV anesthesia    Anesthesia Post Evaluation      Multimodal analgesia: multimodal analgesia used between 6 hours prior to anesthesia start to PACU discharge  Patient location during evaluation: bedside  Patient participation: complete - patient participated  Level of consciousness: responsive to verbal stimuli  Pain management: adequate  Airway patency: patent  Anesthetic complications: no  Cardiovascular status: hemodynamically stable  Respiratory status: spontaneous ventilation  Hydration status: stable        Vitals Value Taken Time   /67 3/13/2020 11:19 AM   Temp 36.5 °C (97.7 °F) 3/13/2020 11:06 AM   Pulse 59 3/13/2020 11:22 AM   Resp 15 3/13/2020 11:19 AM   SpO2 100 % 3/13/2020 11:22 AM   Vitals shown include unvalidated device data.

## 2020-03-18 LAB
BACTERIA SPEC CULT: ABNORMAL
GRAM STN SPEC: ABNORMAL
GRAM STN SPEC: ABNORMAL
SERVICE CMNT-IMP: ABNORMAL

## 2020-03-19 NOTE — OP NOTES
[facility]  OPERATIVE REPORT    Name:  Alanna Davila  MR#:  [id number]  :  1950  ACCOUNT #:  [de-identified]  DATE OF SERVICE:  2020    PREOPERATIVE DIAGNOSIS:  Diabetes with foot ulcer, right heel. POSTOPERATIVE DIAGNOSIS:  Diabetes with foot ulcer, right heel. PROCEDURE PERFORMED:  Bone biopsy, right calcaneus. SURGEON:  Gretta Brink MD    ASSISTANT:  none    ANESTHESIA:  MAC with local block consisting of 1% lidocaine plain. COMPLICATIONS:  None. SPECIMENS REMOVED:  See bone biopsy    IMPLANTS:  none    ESTIMATED BLOOD LOSS:  Less than 5 mL. HEMOSTASIS:  Anatomic dissection. MATERIALS:  None. INJECTABLES:  Postoperative block consisting of 0.5% Marcaine plain. INDICATIONS FOR SURGERY:  The patient is a pleasant 61-year-old diabetic male with history of kidney transplant and chronic right heel wound for the past month. Wound occurred after he stepped on a piece of glass at home and had excision of the foreign body with debridement at an urgent care center without realizing his foot did not have adequate blood supply to heal.  The patient followed up with me in the office. At which time, I referred him to Dr. Ann-Marie Cardona and the patient had a revascularization procedure on the right foot. The patient was then grafted; however, the patient's wound is stalling and failing to heal.  I suspect osteomyelitis. ESR mildly elevated at 3 with CRP at 3.5 and white blood cell count of 11,853 prior to surgery. Consents were signed for the procedure as an outpatient at [facility]. All risks and benefits of the procedure were discussed in detail. All questions answered. The patient presented to the operating room for the procedure. PROCEDURE:  The patient was brought to the operating room and placed on the operating table in supine position. After MAC anesthesia, preoperative local block was given consisting of 1% lidocaine plain.   The foot was then scrubbed, prepped, and draped in the usual aseptic manner. Let it be noted that a pneumatic ankle tourniquet was applied about the patient's right ankle but was never inflated throughout the procedure. Next, attention was directed to the old plantar lateral right heel where multiple deep calcaneal biopsies were obtained using the Jamshidi needle. A central biopsy of bone marrow aspirate was also obtained and this was sent on the deep culture swab to the microbiology lab. The bone biopsies were sent to Pathology. Next, the wound was flushed with copious amounts of sterile normal saline. Hemostasis was achieved with pressure. A postoperative block consisting of 0.5% Marcaine plain was injected. Xeroform and dry sterile dressing were applied about the right foot. The patient tolerated the procedure and anesthesia well. He was transferred from the operating room to the PACU with vital signs stable and neurovascular status intact to the right foot. He will be discharged with both written and oral postoperative instructions.       MD IRIS Littlejohn/[email or web address]/  D:  03/18/2020 18:47  T:  03/19/2020 3:14  JOB #:  [id number]

## 2020-04-01 LAB
BACTERIA SPEC CULT: ABNORMAL
Lab: NORMAL
REFERENCE LAB,REFLB: NORMAL
SERVICE CMNT-IMP: ABNORMAL
TEST DESCRIPTION:,ATST: NORMAL

## 2022-03-18 PROBLEM — G62.9 NEUROPATHY: Status: ACTIVE | Noted: 2019-12-19

## 2022-03-18 PROBLEM — I73.9 PAD (PERIPHERAL ARTERY DISEASE) (HCC): Status: ACTIVE | Noted: 2019-12-19

## 2022-03-18 PROBLEM — I25.10 CAD (CORONARY ARTERY DISEASE): Status: ACTIVE | Noted: 2019-12-19

## 2022-03-19 PROBLEM — I48.91 A-FIB (HCC): Status: ACTIVE | Noted: 2019-12-19

## 2022-03-19 PROBLEM — Z95.1 H/O CORONARY ARTERY BYPASS SURGERY: Status: ACTIVE | Noted: 2019-12-19

## 2022-03-19 PROBLEM — L97.409 HEEL ULCER (HCC): Status: ACTIVE | Noted: 2019-12-19

## 2022-03-19 PROBLEM — E11.9 DM (DIABETES MELLITUS) (HCC): Status: ACTIVE | Noted: 2019-12-19

## 2022-03-19 PROBLEM — Z94.0 KIDNEY TRANSPLANT RECIPIENT: Status: ACTIVE | Noted: 2019-12-19

## 2022-03-20 PROBLEM — J44.9 COPD (CHRONIC OBSTRUCTIVE PULMONARY DISEASE) (HCC): Status: ACTIVE | Noted: 2019-12-19

## 2024-06-04 ENCOUNTER — HOSPITAL ENCOUNTER (INPATIENT)
Age: 74
LOS: 7 days | DRG: 872 | End: 2024-06-11
Attending: EMERGENCY MEDICINE | Admitting: INTERNAL MEDICINE
Payer: MEDICARE

## 2024-06-04 ENCOUNTER — APPOINTMENT (OUTPATIENT)
Dept: GENERAL RADIOLOGY | Age: 74
DRG: 872 | End: 2024-06-04
Payer: MEDICARE

## 2024-06-04 DIAGNOSIS — N39.0 SEPSIS SECONDARY TO UTI (HCC): Primary | ICD-10-CM

## 2024-06-04 DIAGNOSIS — A41.9 SEPSIS SECONDARY TO UTI (HCC): Primary | ICD-10-CM

## 2024-06-04 DIAGNOSIS — E87.5 HYPERKALEMIA: ICD-10-CM

## 2024-06-04 DIAGNOSIS — N17.9 ACUTE KIDNEY INJURY (HCC): ICD-10-CM

## 2024-06-04 DIAGNOSIS — E86.0 DEHYDRATION: ICD-10-CM

## 2024-06-04 DIAGNOSIS — R79.89 ELEVATED LACTIC ACID LEVEL: ICD-10-CM

## 2024-06-04 DIAGNOSIS — K83.09 CHOLANGITIS: ICD-10-CM

## 2024-06-04 DIAGNOSIS — D84.9 IMMUNOSUPPRESSED STATUS (HCC): ICD-10-CM

## 2024-06-04 PROBLEM — E87.20 METABOLIC ACIDOSIS: Status: ACTIVE | Noted: 2024-06-04

## 2024-06-04 PROBLEM — R74.8 ELEVATED LIVER ENZYMES: Status: ACTIVE | Noted: 2024-06-04

## 2024-06-04 PROBLEM — E87.6 HYPOKALEMIA: Status: ACTIVE | Noted: 2024-06-04

## 2024-06-04 LAB
ALBUMIN SERPL-MCNC: 2.2 G/DL (ref 3.2–4.6)
ALBUMIN/GLOB SERPL: 1.1 (ref 1–1.9)
ALP SERPL-CCNC: 870 U/L (ref 40–129)
ALT SERPL-CCNC: 75 U/L (ref 12–65)
ANION GAP BLD CALC-SCNC: 12 MMOL/L
ANION GAP BLD CALC-SCNC: 12.5 MMOL/L
ANION GAP BLD CALC-SCNC: 13.2 MMOL/L
ANION GAP SERPL CALC-SCNC: 12 MMOL/L (ref 9–18)
APPEARANCE UR: CLEAR
ARTERIAL PATENCY WRIST A: POSITIVE
AST SERPL-CCNC: 149 U/L (ref 15–37)
BACTERIA URNS QL MICRO: 0 /HPF
BASE DEFICIT BLD-SCNC: 10.3 MMOL/L
BASOPHILS # BLD: 0 K/UL (ref 0–0.2)
BASOPHILS NFR BLD: 0 % (ref 0–2)
BDY SITE: ABNORMAL
BILIRUB SERPL-MCNC: 2 MG/DL (ref 0–1.2)
BILIRUB UR QL: ABNORMAL
BUN BLD-MCNC: 40 MG/DL (ref 8–26)
BUN BLD-MCNC: 48 MG/DL (ref 8–26)
BUN BLD-MCNC: 65 MG/DL (ref 8–26)
BUN SERPL-MCNC: 57 MG/DL (ref 8–23)
CALCIUM SERPL-MCNC: 8.9 MG/DL (ref 8.8–10.2)
CASTS URNS QL MICRO: ABNORMAL /LPF
CHLORIDE BLD-SCNC: 103 MMOL/L (ref 98–107)
CHLORIDE BLD-SCNC: 95 MMOL/L (ref 98–107)
CHLORIDE BLD-SCNC: 95 MMOL/L (ref 98–107)
CHLORIDE SERPL-SCNC: 94 MMOL/L (ref 98–107)
CO2 BLD-SCNC: 11.8 MMOL/L (ref 21–32)
CO2 BLD-SCNC: 14.5 MMOL/L (ref 21–32)
CO2 BLD-SCNC: 15 MMOL/L (ref 21–32)
CO2 SERPL-SCNC: 14 MMOL/L (ref 20–28)
COLOR UR: ABNORMAL
CREAT BLD-MCNC: 1.49 MG/DL (ref 0.8–1.5)
CREAT BLD-MCNC: 1.76 MG/DL (ref 0.8–1.5)
CREAT BLD-MCNC: 1.89 MG/DL (ref 0.8–1.5)
CREAT SERPL-MCNC: 1.8 MG/DL (ref 0.8–1.3)
CRYSTALS URNS QL MICRO: 0 /LPF
DIFFERENTIAL METHOD BLD: ABNORMAL
EOSINOPHIL # BLD: 0.3 K/UL (ref 0–0.8)
EOSINOPHIL NFR BLD: 2 % (ref 0.5–7.8)
EPI CELLS #/AREA URNS HPF: ABNORMAL /HPF
ERYTHROCYTE [DISTWIDTH] IN BLOOD BY AUTOMATED COUNT: 17.6 % (ref 11.9–14.6)
GAS FLOW.O2 O2 DELIVERY SYS: ABNORMAL
GLOBULIN SER CALC-MCNC: 1.9 G/DL (ref 2.3–3.5)
GLUCOSE BLD STRIP.AUTO-MCNC: 134 MG/DL (ref 65–100)
GLUCOSE BLD STRIP.AUTO-MCNC: 152 MG/DL (ref 65–100)
GLUCOSE BLD STRIP.AUTO-MCNC: 86 MG/DL (ref 65–100)
GLUCOSE BLD STRIP.AUTO-MCNC: 93 MG/DL (ref 65–100)
GLUCOSE BLD STRIP.AUTO-MCNC: 98 MG/DL (ref 65–100)
GLUCOSE BLD-MCNC: 128 MG/DL (ref 65–100)
GLUCOSE BLD-MCNC: 80 MG/DL (ref 65–100)
GLUCOSE BLD-MCNC: 83 MG/DL (ref 65–100)
GLUCOSE SERPL-MCNC: 168 MG/DL (ref 70–99)
GLUCOSE UR STRIP.AUTO-MCNC: NEGATIVE MG/DL
HCO3 BLD-SCNC: 13.3 MMOL/L (ref 22–26)
HCT VFR BLD AUTO: 38 % (ref 41.1–50.3)
HGB BLD-MCNC: 12 G/DL (ref 13.6–17.2)
HGB UR QL STRIP: ABNORMAL
IMM GRANULOCYTES # BLD AUTO: 0.1 K/UL (ref 0–0.5)
IMM GRANULOCYTES NFR BLD AUTO: 1 % (ref 0–5)
KETONES UR QL STRIP.AUTO: ABNORMAL MG/DL
LACTATE SERPL-SCNC: 3.6 MMOL/L (ref 0.5–2)
LACTATE SERPL-SCNC: 3.9 MMOL/L (ref 0.5–2)
LEUKOCYTE ESTERASE UR QL STRIP.AUTO: ABNORMAL
LYMPHOCYTES # BLD: 0.7 K/UL (ref 0.5–4.6)
LYMPHOCYTES NFR BLD: 5 % (ref 13–44)
MAGNESIUM SERPL-MCNC: 1.5 MG/DL (ref 1.8–2.4)
MCH RBC QN AUTO: 22.6 PG (ref 26.1–32.9)
MCHC RBC AUTO-ENTMCNC: 31.6 G/DL (ref 31.4–35)
MCV RBC AUTO: 71.4 FL (ref 82–102)
MONOCYTES # BLD: 1.4 K/UL (ref 0.1–1.3)
MONOCYTES NFR BLD: 9 % (ref 4–12)
MUCOUS THREADS URNS QL MICRO: 0 /LPF
NEUTS SEG # BLD: 12.3 K/UL (ref 1.7–8.2)
NEUTS SEG NFR BLD: 83 % (ref 43–78)
NITRITE UR QL STRIP.AUTO: POSITIVE
NRBC # BLD: 0 K/UL (ref 0–0.2)
NT PRO BNP: 6384 PG/ML (ref 0–125)
PCO2 BLD: 22.5 MMHG (ref 35–45)
PH BLD: 7.38 (ref 7.35–7.45)
PH UR STRIP: 5 (ref 5–9)
PLATELET # BLD AUTO: 211 K/UL (ref 150–450)
PMV BLD AUTO: 9.7 FL (ref 9.4–12.3)
PO2 BLD: 90 MMHG (ref 75–100)
POTASSIUM BLD-SCNC: 4.9 MMOL/L (ref 3.5–5.1)
POTASSIUM BLD-SCNC: 6.1 MMOL/L (ref 3.5–5.1)
POTASSIUM BLD-SCNC: 7 MMOL/L (ref 3.5–5.1)
POTASSIUM SERPL-SCNC: 6.1 MMOL/L (ref 3.5–5.1)
PROCALCITONIN SERPL-MCNC: 2.6 NG/ML (ref 0–0.1)
PROT SERPL-MCNC: 4.1 G/DL (ref 6.3–8.2)
PROT UR STRIP-MCNC: NEGATIVE MG/DL
RBC # BLD AUTO: 5.32 M/UL (ref 4.23–5.6)
RBC #/AREA URNS HPF: ABNORMAL /HPF
SAO2 % BLD: 97 % (ref 95–98)
SERVICE CMNT-IMP: ABNORMAL
SERVICE CMNT-IMP: NORMAL
SODIUM BLD-SCNC: 122 MMOL/L (ref 136–145)
SODIUM BLD-SCNC: 122 MMOL/L (ref 136–145)
SODIUM BLD-SCNC: 128 MMOL/L (ref 136–145)
SODIUM SERPL-SCNC: 121 MMOL/L (ref 136–145)
SP GR UR REFRACTOMETRY: 1.02 (ref 1–1.02)
SPECIMEN TYPE: ABNORMAL
TROPONIN T SERPL HS-MCNC: 65 NG/L (ref 0–22)
TROPONIN T SERPL HS-MCNC: 68 NG/L (ref 0–22)
URINE CULTURE IF INDICATED: ABNORMAL
UROBILINOGEN UR QL STRIP.AUTO: 1 EU/DL (ref 0.2–1)
WBC # BLD AUTO: 14.7 K/UL (ref 4.3–11.1)
WBC URNS QL MICRO: ABNORMAL /HPF

## 2024-06-04 PROCEDURE — 96365 THER/PROPH/DIAG IV INF INIT: CPT

## 2024-06-04 PROCEDURE — 80053 COMPREHEN METABOLIC PANEL: CPT

## 2024-06-04 PROCEDURE — 83605 ASSAY OF LACTIC ACID: CPT

## 2024-06-04 PROCEDURE — 80047 BASIC METABLC PNL IONIZED CA: CPT

## 2024-06-04 PROCEDURE — 2580000003 HC RX 258: Performed by: EMERGENCY MEDICINE

## 2024-06-04 PROCEDURE — 6360000002 HC RX W HCPCS: Performed by: EMERGENCY MEDICINE

## 2024-06-04 PROCEDURE — 80197 ASSAY OF TACROLIMUS: CPT

## 2024-06-04 PROCEDURE — 6370000000 HC RX 637 (ALT 250 FOR IP): Performed by: EMERGENCY MEDICINE

## 2024-06-04 PROCEDURE — 71045 X-RAY EXAM CHEST 1 VIEW: CPT

## 2024-06-04 PROCEDURE — 82803 BLOOD GASES ANY COMBINATION: CPT

## 2024-06-04 PROCEDURE — C9113 INJ PANTOPRAZOLE SODIUM, VIA: HCPCS | Performed by: EMERGENCY MEDICINE

## 2024-06-04 PROCEDURE — 1100000000 HC RM PRIVATE

## 2024-06-04 PROCEDURE — 96375 TX/PRO/DX INJ NEW DRUG ADDON: CPT

## 2024-06-04 PROCEDURE — 94640 AIRWAY INHALATION TREATMENT: CPT

## 2024-06-04 PROCEDURE — 2500000003 HC RX 250 WO HCPCS: Performed by: EMERGENCY MEDICINE

## 2024-06-04 PROCEDURE — 87086 URINE CULTURE/COLONY COUNT: CPT

## 2024-06-04 PROCEDURE — 36600 WITHDRAWAL OF ARTERIAL BLOOD: CPT

## 2024-06-04 PROCEDURE — A4216 STERILE WATER/SALINE, 10 ML: HCPCS | Performed by: EMERGENCY MEDICINE

## 2024-06-04 PROCEDURE — 99221 1ST HOSP IP/OBS SF/LOW 40: CPT

## 2024-06-04 PROCEDURE — 87040 BLOOD CULTURE FOR BACTERIA: CPT

## 2024-06-04 PROCEDURE — 82962 GLUCOSE BLOOD TEST: CPT

## 2024-06-04 PROCEDURE — 81001 URINALYSIS AUTO W/SCOPE: CPT

## 2024-06-04 PROCEDURE — 99285 EMERGENCY DEPT VISIT HI MDM: CPT

## 2024-06-04 PROCEDURE — 85025 COMPLETE CBC W/AUTO DIFF WBC: CPT

## 2024-06-04 PROCEDURE — 96368 THER/DIAG CONCURRENT INF: CPT

## 2024-06-04 PROCEDURE — 6360000002 HC RX W HCPCS: Performed by: INTERNAL MEDICINE

## 2024-06-04 PROCEDURE — 84145 PROCALCITONIN (PCT): CPT

## 2024-06-04 PROCEDURE — 83880 ASSAY OF NATRIURETIC PEPTIDE: CPT

## 2024-06-04 PROCEDURE — 94761 N-INVAS EAR/PLS OXIMETRY MLT: CPT

## 2024-06-04 PROCEDURE — 83735 ASSAY OF MAGNESIUM: CPT

## 2024-06-04 PROCEDURE — 84484 ASSAY OF TROPONIN QUANT: CPT

## 2024-06-04 PROCEDURE — 96361 HYDRATE IV INFUSION ADD-ON: CPT

## 2024-06-04 PROCEDURE — 3E033XZ INTRODUCTION OF VASOPRESSOR INTO PERIPHERAL VEIN, PERCUTANEOUS APPROACH: ICD-10-PCS | Performed by: INTERNAL MEDICINE

## 2024-06-04 PROCEDURE — 6370000000 HC RX 637 (ALT 250 FOR IP): Performed by: INTERNAL MEDICINE

## 2024-06-04 RX ORDER — SODIUM CHLORIDE 0.9 % (FLUSH) 0.9 %
5-40 SYRINGE (ML) INJECTION EVERY 12 HOURS SCHEDULED
Status: DISCONTINUED | OUTPATIENT
Start: 2024-06-04 | End: 2024-06-11 | Stop reason: HOSPADM

## 2024-06-04 RX ORDER — BUDESONIDE AND FORMOTEROL FUMARATE DIHYDRATE 160; 4.5 UG/1; UG/1
2 AEROSOL RESPIRATORY (INHALATION)
Status: DISCONTINUED | OUTPATIENT
Start: 2024-06-04 | End: 2024-06-11 | Stop reason: HOSPADM

## 2024-06-04 RX ORDER — IBUPROFEN 600 MG/1
1 TABLET ORAL PRN
Status: DISCONTINUED | OUTPATIENT
Start: 2024-06-04 | End: 2024-06-04 | Stop reason: SDUPTHER

## 2024-06-04 RX ORDER — ONDANSETRON 2 MG/ML
4 INJECTION INTRAMUSCULAR; INTRAVENOUS EVERY 6 HOURS PRN
Status: DISCONTINUED | OUTPATIENT
Start: 2024-06-04 | End: 2024-06-11 | Stop reason: HOSPADM

## 2024-06-04 RX ORDER — SODIUM CHLORIDE 9 MG/ML
INJECTION, SOLUTION INTRAVENOUS CONTINUOUS
Status: DISCONTINUED | OUTPATIENT
Start: 2024-06-04 | End: 2024-06-04

## 2024-06-04 RX ORDER — SODIUM CHLORIDE 9 MG/ML
INJECTION, SOLUTION INTRAVENOUS PRN
Status: DISCONTINUED | OUTPATIENT
Start: 2024-06-04 | End: 2024-06-11 | Stop reason: HOSPADM

## 2024-06-04 RX ORDER — CALCIUM GLUCONATE 20 MG/ML
1000 INJECTION, SOLUTION INTRAVENOUS ONCE
Status: COMPLETED | OUTPATIENT
Start: 2024-06-04 | End: 2024-06-04

## 2024-06-04 RX ORDER — DEXTROSE MONOHYDRATE 100 MG/ML
INJECTION, SOLUTION INTRAVENOUS CONTINUOUS PRN
Status: DISCONTINUED | OUTPATIENT
Start: 2024-06-04 | End: 2024-06-11 | Stop reason: HOSPADM

## 2024-06-04 RX ORDER — SODIUM CHLORIDE 0.9 % (FLUSH) 0.9 %
5-40 SYRINGE (ML) INJECTION PRN
Status: DISCONTINUED | OUTPATIENT
Start: 2024-06-04 | End: 2024-06-11 | Stop reason: HOSPADM

## 2024-06-04 RX ORDER — ATORVASTATIN CALCIUM 40 MG/1
40 TABLET, FILM COATED ORAL NIGHTLY
Status: DISCONTINUED | OUTPATIENT
Start: 2024-06-05 | End: 2024-06-11 | Stop reason: HOSPADM

## 2024-06-04 RX ORDER — PANTOPRAZOLE SODIUM 40 MG/1
40 TABLET, DELAYED RELEASE ORAL
Status: DISCONTINUED | OUTPATIENT
Start: 2024-06-05 | End: 2024-06-11 | Stop reason: HOSPADM

## 2024-06-04 RX ORDER — INSULIN LISPRO 100 [IU]/ML
0-4 INJECTION, SOLUTION INTRAVENOUS; SUBCUTANEOUS NIGHTLY
Status: DISCONTINUED | OUTPATIENT
Start: 2024-06-04 | End: 2024-06-11 | Stop reason: HOSPADM

## 2024-06-04 RX ORDER — DEXTROSE MONOHYDRATE 100 MG/ML
INJECTION, SOLUTION INTRAVENOUS CONTINUOUS PRN
Status: DISCONTINUED | OUTPATIENT
Start: 2024-06-04 | End: 2024-06-04 | Stop reason: SDUPTHER

## 2024-06-04 RX ORDER — INSULIN LISPRO 100 [IU]/ML
0-4 INJECTION, SOLUTION INTRAVENOUS; SUBCUTANEOUS
Status: DISCONTINUED | OUTPATIENT
Start: 2024-06-05 | End: 2024-06-11 | Stop reason: HOSPADM

## 2024-06-04 RX ORDER — FUROSEMIDE 10 MG/ML
10 INJECTION INTRAMUSCULAR; INTRAVENOUS ONCE
Status: COMPLETED | OUTPATIENT
Start: 2024-06-04 | End: 2024-06-04

## 2024-06-04 RX ORDER — POLYETHYLENE GLYCOL 3350 17 G/17G
17 POWDER, FOR SOLUTION ORAL DAILY PRN
Status: DISCONTINUED | OUTPATIENT
Start: 2024-06-04 | End: 2024-06-11 | Stop reason: HOSPADM

## 2024-06-04 RX ORDER — 0.9 % SODIUM CHLORIDE 0.9 %
2500 INTRAVENOUS SOLUTION INTRAVENOUS ONCE
Status: COMPLETED | OUTPATIENT
Start: 2024-06-04 | End: 2024-06-05

## 2024-06-04 RX ORDER — IBUPROFEN 600 MG/1
1 TABLET ORAL PRN
Status: DISCONTINUED | OUTPATIENT
Start: 2024-06-04 | End: 2024-06-11 | Stop reason: HOSPADM

## 2024-06-04 RX ORDER — MYCOPHENOLATE MOFETIL 250 MG/1
500 CAPSULE ORAL 2 TIMES DAILY
Status: DISCONTINUED | OUTPATIENT
Start: 2024-06-04 | End: 2024-06-11 | Stop reason: HOSPADM

## 2024-06-04 RX ORDER — 0.9 % SODIUM CHLORIDE 0.9 %
500 INTRAVENOUS SOLUTION INTRAVENOUS
Status: DISCONTINUED | OUTPATIENT
Start: 2024-06-04 | End: 2024-06-04

## 2024-06-04 RX ORDER — ONDANSETRON 4 MG/1
4 TABLET, ORALLY DISINTEGRATING ORAL EVERY 8 HOURS PRN
Status: DISCONTINUED | OUTPATIENT
Start: 2024-06-04 | End: 2024-06-11 | Stop reason: HOSPADM

## 2024-06-04 RX ORDER — LEVOTHYROXINE SODIUM 0.1 MG/1
100 TABLET ORAL
Status: DISCONTINUED | OUTPATIENT
Start: 2024-06-05 | End: 2024-06-11 | Stop reason: HOSPADM

## 2024-06-04 RX ORDER — 0.9 % SODIUM CHLORIDE 0.9 %
500 INTRAVENOUS SOLUTION INTRAVENOUS
Status: COMPLETED | OUTPATIENT
Start: 2024-06-04 | End: 2024-06-04

## 2024-06-04 RX ORDER — PREDNISONE 5 MG/1
5 TABLET ORAL
Status: DISCONTINUED | OUTPATIENT
Start: 2024-06-05 | End: 2024-06-06

## 2024-06-04 RX ORDER — ASPIRIN 81 MG/1
81 TABLET, CHEWABLE ORAL DAILY
Status: DISCONTINUED | OUTPATIENT
Start: 2024-06-05 | End: 2024-06-11 | Stop reason: HOSPADM

## 2024-06-04 RX ORDER — FOLIC ACID 1 MG/1
1000 TABLET ORAL DAILY
Status: DISCONTINUED | OUTPATIENT
Start: 2024-06-05 | End: 2024-06-11 | Stop reason: HOSPADM

## 2024-06-04 RX ORDER — ALBUTEROL SULFATE 2.5 MG/3ML
2.5 SOLUTION RESPIRATORY (INHALATION)
Status: DISCONTINUED | OUTPATIENT
Start: 2024-06-04 | End: 2024-06-05

## 2024-06-04 RX ORDER — CLOPIDOGREL BISULFATE 75 MG/1
75 TABLET ORAL NIGHTLY
Status: DISCONTINUED | OUTPATIENT
Start: 2024-06-04 | End: 2024-06-11 | Stop reason: HOSPADM

## 2024-06-04 RX ORDER — ACETAMINOPHEN 325 MG/1
650 TABLET ORAL EVERY 6 HOURS PRN
Status: DISCONTINUED | OUTPATIENT
Start: 2024-06-04 | End: 2024-06-11 | Stop reason: HOSPADM

## 2024-06-04 RX ORDER — INSULIN GLARGINE 100 [IU]/ML
24 INJECTION, SOLUTION SUBCUTANEOUS DAILY
Status: DISCONTINUED | OUTPATIENT
Start: 2024-06-05 | End: 2024-06-05

## 2024-06-04 RX ORDER — IPRATROPIUM BROMIDE AND ALBUTEROL SULFATE 2.5; .5 MG/3ML; MG/3ML
1 SOLUTION RESPIRATORY (INHALATION)
Status: COMPLETED | OUTPATIENT
Start: 2024-06-04 | End: 2024-06-04

## 2024-06-04 RX ORDER — ACETAMINOPHEN 650 MG/1
650 SUPPOSITORY RECTAL EVERY 6 HOURS PRN
Status: DISCONTINUED | OUTPATIENT
Start: 2024-06-04 | End: 2024-06-11 | Stop reason: HOSPADM

## 2024-06-04 RX ORDER — ONDANSETRON 4 MG/1
4 TABLET, ORALLY DISINTEGRATING ORAL
Status: COMPLETED | OUTPATIENT
Start: 2024-06-04 | End: 2024-06-04

## 2024-06-04 RX ORDER — TACROLIMUS 1 MG/1
1 CAPSULE ORAL EVERY 12 HOURS
Status: DISCONTINUED | OUTPATIENT
Start: 2024-06-04 | End: 2024-06-11 | Stop reason: HOSPADM

## 2024-06-04 RX ADMIN — SODIUM CHLORIDE 2500 ML: 9 INJECTION, SOLUTION INTRAVENOUS at 20:23

## 2024-06-04 RX ADMIN — IPRATROPIUM BROMIDE AND ALBUTEROL SULFATE 1 DOSE: .5; 3 SOLUTION RESPIRATORY (INHALATION) at 17:35

## 2024-06-04 RX ADMIN — SODIUM ZIRCONIUM CYCLOSILICATE 10 G: 10 POWDER, FOR SUSPENSION ORAL at 19:34

## 2024-06-04 RX ADMIN — ONDANSETRON 4 MG: 4 TABLET, ORALLY DISINTEGRATING ORAL at 19:42

## 2024-06-04 RX ADMIN — PANTOPRAZOLE SODIUM 40 MG: 40 INJECTION, POWDER, FOR SOLUTION INTRAVENOUS at 19:38

## 2024-06-04 RX ADMIN — CALCIUM GLUCONATE 1000 MG: 20 INJECTION, SOLUTION INTRAVENOUS at 18:30

## 2024-06-04 RX ADMIN — ALBUTEROL SULFATE 2.5 MG: 2.5 SOLUTION RESPIRATORY (INHALATION) at 22:10

## 2024-06-04 RX ADMIN — Medication 2500 MG: at 20:23

## 2024-06-04 RX ADMIN — SODIUM CHLORIDE 500 ML: 9 INJECTION, SOLUTION INTRAVENOUS at 19:16

## 2024-06-04 RX ADMIN — CLOPIDOGREL BISULFATE 75 MG: 75 TABLET ORAL at 23:30

## 2024-06-04 RX ADMIN — DEXTROSE MONOHYDRATE 250 ML: 100 INJECTION, SOLUTION INTRAVENOUS at 18:42

## 2024-06-04 RX ADMIN — CEFTRIAXONE 1000 MG: 1 INJECTION, POWDER, FOR SOLUTION INTRAMUSCULAR; INTRAVENOUS at 19:42

## 2024-06-04 RX ADMIN — FUROSEMIDE 10 MG: 10 INJECTION, SOLUTION INTRAMUSCULAR; INTRAVENOUS at 19:48

## 2024-06-04 RX ADMIN — INSULIN HUMAN 10 UNITS: 100 INJECTION, SOLUTION PARENTERAL at 19:08

## 2024-06-04 ASSESSMENT — LIFESTYLE VARIABLES
HOW MANY STANDARD DRINKS CONTAINING ALCOHOL DO YOU HAVE ON A TYPICAL DAY: PATIENT DOES NOT DRINK
HOW OFTEN DO YOU HAVE A DRINK CONTAINING ALCOHOL: NEVER

## 2024-06-04 ASSESSMENT — PAIN - FUNCTIONAL ASSESSMENT: PAIN_FUNCTIONAL_ASSESSMENT: NONE - DENIES PAIN

## 2024-06-04 NOTE — ED NOTES
EPO results:    Na 122  K 7  Cl 95  TCO2 14.5    Glu 83  BUN 65  Crea 1.76       Shanika Serna, DERIK  06/04/24 2388

## 2024-06-04 NOTE — ED TRIAGE NOTES
Pt brought in by EMS from Nephrology office r/t SOB, bilateral LE and abdominal swelling xs 3 days. Pt recently seen and treated at [facility] for similar symptoms. HX kidney failure, quadruple bypass and CHF. Pt A&O 4/4, Per EMS vitals stable en route. Pt connected to cardiac monitor with alarms set.

## 2024-06-04 NOTE — ED PROVIDER NOTES
Emergency Department Provider Note                   PCP:                Ata Melo MD               Age: 73 y.o.      Sex: male   Final diagnosis/impression:  1. Sepsis secondary to UTI (HCC)    2. Acute kidney injury (HCC)    3. Hyperkalemia    4. Dehydration    5. Immunosuppressed status (HCC)    6. Elevated lactic acid level       Disposition: Admit to Hospitalist    MDM/Clinical Course:  Patient seen by myself at the [facility] emergency department. Patient had signs symptoms and clinical history most consistent with concern for worsening peripheral edema, concern for volume overload and potentially for renal injury. My independent analysis/interpretation of laboratory work-up here shows CBC shows leukocytosis at 14.7, hemoglobin mildly low at 12.0, platelets within normal limits, initial point-of-care chemistry testing shows potassium of 7.0 with elevated creatinine at 1.76.  Official CMP shows multiple abnormalities including elevated potassium at 6.1, bicarb of 14 with no anion gap, elevated creatinine at 1.8, elevated BUN of 57 this represents dehydration/suspected kidney injury.  CMP also shows signs of elevated alk phos at 870, mildly increased AST/ALT suspect related to sepsis/volume depletion, bilirubin mildly elevated at 2.0.  Initial lactic elevated at 3.9, later on repeat lactic acid remains elevated at 3.9.  NT proBNP elevated at 6394, suspect in's conjunction with renal dysfunction however ultimate etiology of this is somewhat unclear.  Urinalysis appears to be significant positive for UTI including nitrite positive findings, small leuk esterase, elevated urine white blood cells. Radiology shows chest x-ray is unremarkable for acute/emergent abnormality. While under my care, patient received DuoNeb treatment, IV Rocephin for UTI, IV calcium and, insulin and dextrose, aggressive IV fluid loading, also received 10 mg IV Lasix despite renal dysfunction for persistent hyper-K despite     following brain bleed    Sleep apnea     no c pap    Thyroid disease     hypo    Type 2 diabetes mellitus (HCC)     avg ,symptoms hypoglycemia at 52     Surgical history:   Past Surgical History:   Procedure Laterality Date    CARDIAC CATHETERIZATION  2015    CATARACT REMOVAL Bilateral 2014    COLONOSCOPY      HEENT Bilateral     laser sx for bleeding due to diabetic retinopathy    HERNIA REPAIR  2017    umbilical hernia repair    PACEMAKER  2015    dual chamber medtronic    [place] CARDIAC SURG PROCEDURE UNLIST  2015    cabg x4    TRANSPLANT Right 2016    renal transplant    VASCULAR SURGERY      peritoneal dialysis catheter- removed 2017    VASCULAR SURGERY  12/27/2019    right lower extremity arteriogram/stent/balloon     Family history:   Family History   Problem Relation Age of Onset    Stroke Mother     Cancer Father         pancreatic     Social history:   Social History     Socioeconomic History    Marital status: Life Partner   Tobacco Use    Smoking status: Former     Current packs/day: 0.00     Types: Cigarettes     Quit date: 1/1/2014     Years since quitting: 10.4    Smokeless tobacco: Never   Substance and Sexual Activity    Alcohol use: Yes    Drug use: Never     Medications:   Previous Medications    ALBUTEROL SULFATE  (90 BASE) MCG/ACT INHALER    Inhale into the lungs every 4 hours as needed    AMLODIPINE (NORVASC) 10 MG TABLET    Take 5 mg by mouth daily    ASPIRIN 81 MG CHEWABLE TABLET    Take 81 mg by mouth daily    ATORVASTATIN (LIPITOR) 40 MG TABLET    Take 40 mg by mouth    BUDESONIDE-FORMOTEROL (SYMBICORT) 160-4.5 MCG/ACT AERO    Inhale 2 puffs into the lungs 2 times daily    CLOPIDOGREL (PLAVIX) 75 MG TABLET    Take 75 mg by mouth daily    FOLIC ACID (FOLVITE) 800 MCG TABLET    Take 800 mcg by mouth daily    GUAIFENESIN 1200 MG TB12    Take 1,200 mg by mouth as needed    INSULIN DEGLUDEC (TRESIBA FLEXTOUCH) 200 UNIT/ML SOPN    Inject 30 Units into the skin daily    INSULIN REGULAR  dry    Procedures  Results for orders placed or performed during the hospital encounter of 06/04/24   Blood Culture 1    Specimen: Blood   Result Value Ref Range    Special Requests RIGHT  Antecubital        Culture PENDING    XR CHEST PORTABLE    Narrative    Chest X-ray    INDICATION: Dyspnea    COMPARISON:  None    TECHNIQUE: AP/PA view of the chest was obtained.    FINDINGS:  Left-sided cardiac pacing device. Median sternotomy wires are seen.     The lungs are clear. There are no infiltrates or effusions.  The heart size is  normal.  The bony thorax is intact.        Impression    No acute findings in the chest     CBC with Auto Differential   Result Value Ref Range    WBC 14.7 (H) 4.3 - 11.1 K/uL    RBC 5.32 4.23 - 5.6 M/uL    Hemoglobin 12.0 (L) 13.6 - 17.2 g/dL    Hematocrit 38.0 (L) 41.1 - 50.3 %    MCV 71.4 (L) 82 - 102 FL    MCH 22.6 (L) 26.1 - 32.9 PG    MCHC 31.6 31.4 - 35.0 g/dL    RDW 17.6 (H) 11.9 - 14.6 %    Platelets 211 150 - 450 K/uL    MPV 9.7 9.4 - 12.3 FL    nRBC 0.00 0.0 - 0.2 K/uL    Differential Type AUTOMATED      Neutrophils % 83 (H) 43 - 78 %    Lymphocytes % 5 (L) 13 - 44 %    Monocytes % 9 4.0 - 12.0 %    Eosinophils % 2 0.5 - 7.8 %    Basophils % 0 0.0 - 2.0 %    Immature Granulocytes % 1 0.0 - 5.0 %    Neutrophils Absolute 12.3 (H) 1.7 - 8.2 K/UL    Lymphocytes Absolute 0.7 0.5 - 4.6 K/UL    Monocytes Absolute 1.4 (H) 0.1 - 1.3 K/UL    Eosinophils Absolute 0.3 0.0 - 0.8 K/UL    Basophils Absolute 0.0 0.0 - 0.2 K/UL    Immature Granulocytes Absolute 0.1 0.0 - 0.5 K/UL   Urinalysis with Reflex to Culture    Specimen: Urine   Result Value Ref Range    Color, UA DARK YELLOW      Appearance CLEAR      Specific Gravity, UA 1.022 1.001 - 1.023      pH, Urine 5.0 5.0 - 9.0      Protein, UA Negative NEG mg/dL    Glucose, Ur Negative mg/dL    Ketones, Urine TRACE (A) NEG mg/dL    Bilirubin, Urine MODERATE (A) NEG      Blood, Urine SMALL (A) NEG      Urobilinogen, Urine 1.0 0.2 - 1.0 EU/dL

## 2024-06-05 ENCOUNTER — APPOINTMENT (OUTPATIENT)
Dept: CT IMAGING | Age: 74
DRG: 872 | End: 2024-06-05
Payer: MEDICARE

## 2024-06-05 ENCOUNTER — APPOINTMENT (OUTPATIENT)
Dept: ULTRASOUND IMAGING | Age: 74
DRG: 872 | End: 2024-06-05
Payer: MEDICARE

## 2024-06-05 PROBLEM — N17.9 ACUTE KIDNEY INJURY (HCC): Status: ACTIVE | Noted: 2024-06-05

## 2024-06-05 PROBLEM — E87.70 VOLUME OVERLOAD: Status: ACTIVE | Noted: 2024-06-05

## 2024-06-05 PROBLEM — R06.00 DYSPNEA: Status: ACTIVE | Noted: 2024-06-05

## 2024-06-05 PROBLEM — D84.9 IMMUNOSUPPRESSED STATUS (HCC): Chronic | Status: ACTIVE | Noted: 2024-06-05

## 2024-06-05 LAB
ALBUMIN SERPL-MCNC: 2.2 G/DL (ref 3.2–4.6)
ALBUMIN/GLOB SERPL: 1.2 (ref 1–1.9)
ALP SERPL-CCNC: 814 U/L (ref 40–129)
ALT SERPL-CCNC: 85 U/L (ref 12–65)
ANION GAP SERPL CALC-SCNC: 13 MMOL/L (ref 9–18)
ANION GAP SERPL CALC-SCNC: 13 MMOL/L (ref 9–18)
AST SERPL-CCNC: 173 U/L (ref 15–37)
BASOPHILS # BLD: 0 K/UL (ref 0–0.2)
BASOPHILS NFR BLD: 0 % (ref 0–2)
BILIRUB DIRECT SERPL-MCNC: 1.9 MG/DL (ref 0–0.4)
BILIRUB SERPL-MCNC: 2 MG/DL (ref 0–1.2)
BUN SERPL-MCNC: 61 MG/DL (ref 8–23)
BUN SERPL-MCNC: 67 MG/DL (ref 8–23)
CALCIUM SERPL-MCNC: 8.5 MG/DL (ref 8.8–10.2)
CALCIUM SERPL-MCNC: 9.1 MG/DL (ref 8.8–10.2)
CHLORIDE SERPL-SCNC: 90 MMOL/L (ref 98–107)
CHLORIDE SERPL-SCNC: 96 MMOL/L (ref 98–107)
CO2 SERPL-SCNC: 13 MMOL/L (ref 20–28)
CO2 SERPL-SCNC: 15 MMOL/L (ref 20–28)
CREAT SERPL-MCNC: 2.04 MG/DL (ref 0.8–1.3)
CREAT SERPL-MCNC: 2.49 MG/DL (ref 0.8–1.3)
DIFFERENTIAL METHOD BLD: ABNORMAL
EOSINOPHIL # BLD: 0.5 K/UL (ref 0–0.8)
EOSINOPHIL NFR BLD: 4 % (ref 0.5–7.8)
ERYTHROCYTE [DISTWIDTH] IN BLOOD BY AUTOMATED COUNT: 17.4 % (ref 11.9–14.6)
EST. AVERAGE GLUCOSE BLD GHB EST-MCNC: 192 MG/DL
GLOBULIN SER CALC-MCNC: 1.8 G/DL (ref 2.3–3.5)
GLUCOSE BLD STRIP.AUTO-MCNC: 161 MG/DL (ref 65–100)
GLUCOSE BLD STRIP.AUTO-MCNC: 178 MG/DL (ref 65–100)
GLUCOSE BLD STRIP.AUTO-MCNC: 194 MG/DL (ref 65–100)
GLUCOSE BLD STRIP.AUTO-MCNC: 197 MG/DL (ref 65–100)
GLUCOSE BLD STRIP.AUTO-MCNC: 230 MG/DL (ref 65–100)
GLUCOSE BLD STRIP.AUTO-MCNC: 237 MG/DL (ref 65–100)
GLUCOSE BLD STRIP.AUTO-MCNC: 244 MG/DL (ref 65–100)
GLUCOSE BLD STRIP.AUTO-MCNC: 283 MG/DL (ref 65–100)
GLUCOSE BLD STRIP.AUTO-MCNC: 287 MG/DL (ref 65–100)
GLUCOSE BLD STRIP.AUTO-MCNC: 315 MG/DL (ref 65–100)
GLUCOSE BLD STRIP.AUTO-MCNC: 323 MG/DL (ref 65–100)
GLUCOSE SERPL-MCNC: 153 MG/DL (ref 70–99)
GLUCOSE SERPL-MCNC: 329 MG/DL (ref 70–99)
HAV IGM SER QL: NONREACTIVE
HBA1C MFR BLD: 8.3 % (ref 0–5.6)
HBV CORE IGM SER QL: NONREACTIVE
HBV SURFACE AG SER QL: NONREACTIVE
HCT VFR BLD AUTO: 33 % (ref 41.1–50.3)
HCV AB SER QL: NONREACTIVE
HGB BLD-MCNC: 10.5 G/DL (ref 13.6–17.2)
IMM GRANULOCYTES # BLD AUTO: 0.1 K/UL (ref 0–0.5)
IMM GRANULOCYTES NFR BLD AUTO: 1 % (ref 0–5)
LACTATE SERPL-SCNC: 4 MMOL/L (ref 0.5–2)
LACTATE SERPL-SCNC: 4.2 MMOL/L (ref 0.5–2)
LACTATE SERPL-SCNC: 4.3 MMOL/L (ref 0.5–2)
LACTATE SERPL-SCNC: 4.5 MMOL/L (ref 0.5–2)
LYMPHOCYTES # BLD: 0.8 K/UL (ref 0.5–4.6)
LYMPHOCYTES NFR BLD: 7 % (ref 13–44)
MAGNESIUM SERPL-MCNC: 1.5 MG/DL (ref 1.8–2.4)
MCH RBC QN AUTO: 22.5 PG (ref 26.1–32.9)
MCHC RBC AUTO-ENTMCNC: 31.8 G/DL (ref 31.4–35)
MCV RBC AUTO: 70.8 FL (ref 82–102)
MONOCYTES # BLD: 1.1 K/UL (ref 0.1–1.3)
MONOCYTES NFR BLD: 9 % (ref 4–12)
NEUTS SEG # BLD: 9.5 K/UL (ref 1.7–8.2)
NEUTS SEG NFR BLD: 79 % (ref 43–78)
NRBC # BLD: 0 K/UL (ref 0–0.2)
OSMOLALITY SERPL: 288 MOSM/KG H2O (ref 280–301)
PLATELET # BLD AUTO: 182 K/UL (ref 150–450)
PMV BLD AUTO: 9.9 FL (ref 9.4–12.3)
POTASSIUM SERPL-SCNC: 5.8 MMOL/L (ref 3.5–5.1)
POTASSIUM SERPL-SCNC: 5.9 MMOL/L (ref 3.5–5.1)
POTASSIUM SERPL-SCNC: 6 MMOL/L (ref 3.5–5.1)
POTASSIUM SERPL-SCNC: 6.1 MMOL/L (ref 3.5–5.1)
PROT SERPL-MCNC: 4 G/DL (ref 6.3–8.2)
RBC # BLD AUTO: 4.66 M/UL (ref 4.23–5.6)
SERVICE CMNT-IMP: ABNORMAL
SODIUM SERPL-SCNC: 118 MMOL/L (ref 136–145)
SODIUM SERPL-SCNC: 122 MMOL/L (ref 136–145)
TSH W FREE THYROID IF ABNORMAL: 2.42 UIU/ML (ref 0.27–4.2)
WBC # BLD AUTO: 12.1 K/UL (ref 4.3–11.1)

## 2024-06-05 PROCEDURE — 2580000003 HC RX 258: Performed by: FAMILY MEDICINE

## 2024-06-05 PROCEDURE — 2580000003 HC RX 258: Performed by: INTERNAL MEDICINE

## 2024-06-05 PROCEDURE — 6360000004 HC RX CONTRAST MEDICATION: Performed by: FAMILY MEDICINE

## 2024-06-05 PROCEDURE — 6370000000 HC RX 637 (ALT 250 FOR IP): Performed by: INTERNAL MEDICINE

## 2024-06-05 PROCEDURE — 36415 COLL VENOUS BLD VENIPUNCTURE: CPT

## 2024-06-05 PROCEDURE — 94640 AIRWAY INHALATION TREATMENT: CPT

## 2024-06-05 PROCEDURE — 76775 US EXAM ABDO BACK WALL LIM: CPT

## 2024-06-05 PROCEDURE — 97161 PT EVAL LOW COMPLEX 20 MIN: CPT

## 2024-06-05 PROCEDURE — 82962 GLUCOSE BLOOD TEST: CPT

## 2024-06-05 PROCEDURE — 2500000003 HC RX 250 WO HCPCS: Performed by: INTERNAL MEDICINE

## 2024-06-05 PROCEDURE — 99232 SBSQ HOSP IP/OBS MODERATE 35: CPT | Performed by: INTERNAL MEDICINE

## 2024-06-05 PROCEDURE — 84132 ASSAY OF SERUM POTASSIUM: CPT

## 2024-06-05 PROCEDURE — 84443 ASSAY THYROID STIM HORMONE: CPT

## 2024-06-05 PROCEDURE — 6360000002 HC RX W HCPCS: Performed by: INTERNAL MEDICINE

## 2024-06-05 PROCEDURE — 85025 COMPLETE CBC W/AUTO DIFF WBC: CPT

## 2024-06-05 PROCEDURE — 83930 ASSAY OF BLOOD OSMOLALITY: CPT

## 2024-06-05 PROCEDURE — 94761 N-INVAS EAR/PLS OXIMETRY MLT: CPT

## 2024-06-05 PROCEDURE — 83036 HEMOGLOBIN GLYCOSYLATED A1C: CPT

## 2024-06-05 PROCEDURE — 83605 ASSAY OF LACTIC ACID: CPT

## 2024-06-05 PROCEDURE — 2140000000 HC CCU INTERMEDIATE R&B

## 2024-06-05 PROCEDURE — 80197 ASSAY OF TACROLIMUS: CPT

## 2024-06-05 PROCEDURE — 83735 ASSAY OF MAGNESIUM: CPT

## 2024-06-05 PROCEDURE — 71250 CT THORAX DX C-: CPT

## 2024-06-05 PROCEDURE — 6360000002 HC RX W HCPCS: Performed by: FAMILY MEDICINE

## 2024-06-05 PROCEDURE — 6370000000 HC RX 637 (ALT 250 FOR IP): Performed by: FAMILY MEDICINE

## 2024-06-05 PROCEDURE — 80048 BASIC METABOLIC PNL TOTAL CA: CPT

## 2024-06-05 PROCEDURE — 80076 HEPATIC FUNCTION PANEL: CPT

## 2024-06-05 PROCEDURE — 97530 THERAPEUTIC ACTIVITIES: CPT

## 2024-06-05 PROCEDURE — 80074 ACUTE HEPATITIS PANEL: CPT

## 2024-06-05 RX ORDER — SODIUM CHLORIDE, SODIUM LACTATE, POTASSIUM CHLORIDE, AND CALCIUM CHLORIDE .6; .31; .03; .02 G/100ML; G/100ML; G/100ML; G/100ML
500 INJECTION, SOLUTION INTRAVENOUS ONCE
Status: COMPLETED | OUTPATIENT
Start: 2024-06-05 | End: 2024-06-05

## 2024-06-05 RX ORDER — ALBUTEROL SULFATE 2.5 MG/3ML
2.5 SOLUTION RESPIRATORY (INHALATION)
Status: DISCONTINUED | OUTPATIENT
Start: 2024-06-05 | End: 2024-06-11 | Stop reason: HOSPADM

## 2024-06-05 RX ORDER — ALBUTEROL SULFATE 2.5 MG/3ML
2.5 SOLUTION RESPIRATORY (INHALATION) EVERY 4 HOURS PRN
Status: DISCONTINUED | OUTPATIENT
Start: 2024-06-05 | End: 2024-06-11 | Stop reason: HOSPADM

## 2024-06-05 RX ORDER — MAGNESIUM SULFATE IN WATER 40 MG/ML
2000 INJECTION, SOLUTION INTRAVENOUS ONCE
Status: COMPLETED | OUTPATIENT
Start: 2024-06-05 | End: 2024-06-05

## 2024-06-05 RX ORDER — TRAMADOL HYDROCHLORIDE 50 MG/1
50 TABLET ORAL EVERY 6 HOURS PRN
Status: DISCONTINUED | OUTPATIENT
Start: 2024-06-05 | End: 2024-06-11 | Stop reason: HOSPADM

## 2024-06-05 RX ORDER — METHOCARBAMOL 750 MG/1
750 TABLET, FILM COATED ORAL 4 TIMES DAILY PRN
Status: DISCONTINUED | OUTPATIENT
Start: 2024-06-05 | End: 2024-06-08

## 2024-06-05 RX ADMIN — METHOCARBAMOL TABLETS 750 MG: 750 TABLET, COATED ORAL at 18:05

## 2024-06-05 RX ADMIN — SODIUM CHLORIDE, PRESERVATIVE FREE 10 ML: 5 INJECTION INTRAVENOUS at 02:00

## 2024-06-05 RX ADMIN — PREDNISONE 5 MG: 5 TABLET ORAL at 09:02

## 2024-06-05 RX ADMIN — INSULIN HUMAN 10 UNITS: 100 INJECTION, SOLUTION PARENTERAL at 16:41

## 2024-06-05 RX ADMIN — SODIUM BICARBONATE: 84 INJECTION, SOLUTION INTRAVENOUS at 02:01

## 2024-06-05 RX ADMIN — LEVOTHYROXINE SODIUM 100 MCG: 0.1 TABLET ORAL at 05:13

## 2024-06-05 RX ADMIN — DEXTROSE MONOHYDRATE 250 ML: 100 INJECTION, SOLUTION INTRAVENOUS at 09:13

## 2024-06-05 RX ADMIN — SODIUM BICARBONATE: 84 INJECTION, SOLUTION INTRAVENOUS at 11:45

## 2024-06-05 RX ADMIN — TRAMADOL HYDROCHLORIDE 50 MG: 50 TABLET ORAL at 02:46

## 2024-06-05 RX ADMIN — SODIUM CHLORIDE, POTASSIUM CHLORIDE, SODIUM LACTATE AND CALCIUM CHLORIDE 500 ML: 600; 310; 30; 20 INJECTION, SOLUTION INTRAVENOUS at 05:13

## 2024-06-05 RX ADMIN — ATORVASTATIN CALCIUM 40 MG: 40 TABLET, FILM COATED ORAL at 21:04

## 2024-06-05 RX ADMIN — FOLIC ACID 1000 MCG: 1 TABLET ORAL at 09:02

## 2024-06-05 RX ADMIN — BUDESONIDE AND FORMOTEROL FUMARATE DIHYDRATE 2 PUFF: 160; 4.5 AEROSOL RESPIRATORY (INHALATION) at 08:17

## 2024-06-05 RX ADMIN — HYDROCORTISONE SODIUM SUCCINATE 100 MG: 100 INJECTION, POWDER, FOR SOLUTION INTRAMUSCULAR; INTRAVENOUS at 18:30

## 2024-06-05 RX ADMIN — METHOCARBAMOL TABLETS 750 MG: 750 TABLET, COATED ORAL at 14:23

## 2024-06-05 RX ADMIN — TACROLIMUS 1 MG: 1 CAPSULE ORAL at 21:03

## 2024-06-05 RX ADMIN — ALBUTEROL SULFATE 2.5 MG: 2.5 SOLUTION RESPIRATORY (INHALATION) at 08:17

## 2024-06-05 RX ADMIN — DIATRIZOATE MEGLUMINE AND DIATRIZOATE SODIUM 15 ML: 660; 100 LIQUID ORAL; RECTAL at 08:59

## 2024-06-05 RX ADMIN — ASPIRIN 81 MG 81 MG: 81 TABLET ORAL at 09:02

## 2024-06-05 RX ADMIN — DEXTROSE MONOHYDRATE 250 ML: 100 INJECTION, SOLUTION INTRAVENOUS at 16:41

## 2024-06-05 RX ADMIN — SODIUM BICARBONATE: 84 INJECTION, SOLUTION INTRAVENOUS at 22:09

## 2024-06-05 RX ADMIN — CEFTRIAXONE 1000 MG: 1 INJECTION, POWDER, FOR SOLUTION INTRAMUSCULAR; INTRAVENOUS at 21:04

## 2024-06-05 RX ADMIN — ALBUTEROL SULFATE 2.5 MG: 2.5 SOLUTION RESPIRATORY (INHALATION) at 20:00

## 2024-06-05 RX ADMIN — SODIUM ZIRCONIUM CYCLOSILICATE 10 G: 10 POWDER, FOR SUSPENSION ORAL at 09:04

## 2024-06-05 RX ADMIN — INSULIN HUMAN 5 UNITS: 100 INJECTION, SOLUTION PARENTERAL at 09:14

## 2024-06-05 RX ADMIN — INSULIN LISPRO 4 UNITS: 100 INJECTION, SOLUTION INTRAVENOUS; SUBCUTANEOUS at 21:05

## 2024-06-05 RX ADMIN — MAGNESIUM SULFATE HEPTAHYDRATE 2000 MG: 40 INJECTION, SOLUTION INTRAVENOUS at 09:14

## 2024-06-05 RX ADMIN — SODIUM CHLORIDE, PRESERVATIVE FREE 10 ML: 5 INJECTION INTRAVENOUS at 21:06

## 2024-06-05 RX ADMIN — METHOCARBAMOL TABLETS 750 MG: 750 TABLET, COATED ORAL at 03:29

## 2024-06-05 RX ADMIN — SODIUM CHLORIDE, PRESERVATIVE FREE 10 ML: 5 INJECTION INTRAVENOUS at 09:07

## 2024-06-05 RX ADMIN — PANTOPRAZOLE SODIUM 40 MG: 40 TABLET, DELAYED RELEASE ORAL at 05:13

## 2024-06-05 RX ADMIN — TRAMADOL HYDROCHLORIDE 50 MG: 50 TABLET ORAL at 14:23

## 2024-06-05 RX ADMIN — CLOPIDOGREL BISULFATE 75 MG: 75 TABLET ORAL at 21:03

## 2024-06-05 RX ADMIN — SODIUM ZIRCONIUM CYCLOSILICATE 10 G: 10 POWDER, FOR SUSPENSION ORAL at 14:17

## 2024-06-05 RX ADMIN — HYDROCORTISONE SODIUM SUCCINATE 100 MG: 100 INJECTION, POWDER, FOR SOLUTION INTRAMUSCULAR; INTRAVENOUS at 11:31

## 2024-06-05 RX ADMIN — BUDESONIDE AND FORMOTEROL FUMARATE DIHYDRATE 2 PUFF: 160; 4.5 AEROSOL RESPIRATORY (INHALATION) at 20:01

## 2024-06-05 RX ADMIN — SODIUM ZIRCONIUM CYCLOSILICATE 10 G: 10 POWDER, FOR SUSPENSION ORAL at 21:03

## 2024-06-05 ASSESSMENT — PAIN SCALES - GENERAL
PAINLEVEL_OUTOF10: 8
PAINLEVEL_OUTOF10: 3
PAINLEVEL_OUTOF10: 6
PAINLEVEL_OUTOF10: 5
PAINLEVEL_OUTOF10: 0
PAINLEVEL_OUTOF10: 0

## 2024-06-05 ASSESSMENT — PAIN DESCRIPTION - ORIENTATION: ORIENTATION: LEFT;RIGHT

## 2024-06-05 ASSESSMENT — PAIN DESCRIPTION - DESCRIPTORS: DESCRIPTORS: THROBBING

## 2024-06-05 ASSESSMENT — PAIN DESCRIPTION - LOCATION: LOCATION: LEG;FOOT

## 2024-06-05 NOTE — PROGRESS NOTES
ACUTE PHYSICAL THERAPY GOALS:   (Developed with and agreed upon by patient and/or caregiver.)  Pt will perform bed mobility Mod (I) c inc time, cueing and use of rails as needed in 7 therapy sessions.  Pt will perform sit-to-stand/ stand-to-sit transfers CG(A) c use of LRAD/external supports as needed and no LOB or miss-steps in 7 therapy sessions.  Pt will ambulate 50 ft CG(A) with use of LRAD, no LOB or miss-steps and breaks as needed in 7 therapy sessions.  Pt will perform standing dynamic balance activities with minimal postural sway in 7 therapy sessions.  Pt will tolerate multiple sets and reps of BLE exercises in 7 therapy sessions.      PHYSICAL THERAPY Initial Assessment, Daily Note, and AM  (Link to Caseload Tracking: PT Visit Days : 1  Acknowledge Orders  Time In/Out  PT Charge Capture  Rehab Caseload Tracker    FALL RISK    Chris Anderson is a 73 y.o. male   PRIMARY DIAGNOSIS: Hyperkalemia  Dehydration [E86.0]  Hyperkalemia [E87.5]  Acute kidney injury (HCC) [N17.9]  Immunosuppressed status (HCC) [D84.9]  Sepsis secondary to UTI (HCC) [A41.9, N39.0]  Elevated lactic acid level [R79.89]       Reason for Referral: Generalized Muscle Weakness (M62.81)  Difficulty in walking, Not elsewhere classified (R26.2)  Other abnormalities of gait and mobility (R26.89)  History of falling (Z91.81)  Inpatient: Payor: [employer] MEDICARE / Plan: [employer] GOLD PLUS HMO / Product Type: *No Product type* /     ASSESSMENT:     REHAB RECOMMENDATIONS:   Recommendation to date pending progress:  Setting:  Short-term Rehab    Equipment:    To Be Determined     ASSESSMENT:  Mr. Anderson Is a 73 y.o. male presenting to PT after being admitted on 6/4/24 for hyperkalemia associated with worsening weakness and fall at home. At time of initial evaluation, pt presents significantly below baseline LOF with deficits in bed mobility, strength, transfers, balance, gait and activity tolerance limiting his overall functional mobility.  RA for all   IV and Telemetry     RESTRICTIONS/PRECAUTIONS:  Restrictions/Precautions: Fall Risk                 GROSS EVALUATION:  Intact Impaired (Comments):   AROM [x]     PROM []    Strength []  General functional strength significantly diminished from baseline  -BLE gen 4-/5 ELI   Balance []  Inc postural sway but no significant LOB/miss-steps   Posture [] Forward Head  Rounded Shoulders   Sensation []  NT   Coordination [x]      Tone [x]     Edema []    Activity Tolerance []  Significantly limited by general weakness and fatigue     []      COGNITION/  PERCEPTION: Intact Impaired (Comments):   Orientation [x]     Vision [x]  Not Formally Assessed but WFL   Hearing [x]  Not Formally Assessed but WFL   Cognition  [x]       MOBILITY: I Mod I S SBA CGA Min Mod Max Total  NT x2 Comments:   Bed Mobility    Rolling [] [] [] [] [x] [] [] [] [] [] []    Supine to Sit [] [] [] [] [x] [] [] [] [] [] [] Heavy reliance on rails and inc time for all bed mob   Scooting [] [] [] [] [] [x] [] [] [] [] [] HHA   Sit to Supine [] [] [] [] [] [x] [] [] [] [] []    Transfers    Sit to Stand [] [] [] [] [] [x] [] [] [] [] []    Bed to Chair [] [] [] [] [] [x] [] [] [] [] [] HHA for all; SPT EOB to BSC and back   Stand to Sit [] [] [] [] [] [x] [] [] [] [] []     [] [] [] [] [] [] [] [] [] [] []    I=Independent, Mod I=Modified Independent, S=Supervision, SBA=Standby Assistance, CGA=Contact Guard Assistance,   Min=Minimal Assistance, Mod=Moderate Assistance, Max=Maximal Assistance, Total=Total Assistance, NT=Not Tested    GAIT: I Mod I S SBA CGA Min Mod Max Total  NT x2 Comments:   Level of Assistance [] [] [] [] [] [x] [] [] [] [] []    Distance   2-3 steps during SPT from bed to BSC and back    DME HHA    Gait Quality Decreased jazmine , Decreased step clearance, Decreased step length, Shuffling , and Trunk sway increased    Weightbearing Status Restrictions/Precautions  Restrictions/Precautions: Fall Risk    Stairs

## 2024-06-05 NOTE — ICUWATCH
RRT Clinical Rounding Nurse Progress Report      SUBJECTIVE: Called to assess patient secondary to RN/provider concern - abnormal electrolytes .      Vitals:    06/05/24 0818 06/05/24 1147 06/05/24 1423 06/05/24 1558   BP:  (!) 126/49  100/61   Pulse:  70  69   Resp:  17 19 17   Temp:  97.4 °F (36.3 °C)  98.1 °F (36.7 °C)   TempSrc:  Temporal  Axillary   SpO2: 91% 95%  95%   Weight:       Height:              ASSESSMENT:  On arrival to room, I found patient to be resting in bed quietly. Patient is alert and oriented X 4. Denies any pain or SOB. Respirations even and unlabored. Bilateral lung sounds clear on RA. PT denies any needs or concerns at this time. Recent labs/notes/vitals reviewed and pt discussed with primary RN. Potassium elevated, medication interventions already completed. BMP recheck ordered for 2200.    PLAN:  Will follow per RRT Clinical Rounding Program protocol. Primary RN to call with concerns.    Deni Matos RN  [place]: [phone]

## 2024-06-05 NOTE — ED NOTES
TRANSFER - OUT REPORT:    Verbal report given to Emmy MORE on Chris Anderson  being transferred to [facility] for routine progression of patient care       Report consisted of patient's Situation, Background, Assessment and   Recommendations(SBAR).     Information from the following report(s) Nurse Handoff Report was reviewed with the receiving nurse.    Dwain Fall Assessment:    Presents to emergency department  because of falls (Syncope, seizure, or loss of consciousness): No  Age > 70: Yes  Altered Mental Status, Intoxication with alcohol or substance confusion (Disorientation, impaired judgment, poor safety awaremess, or inability to follow instructions): No  Impaired Mobility: Ambulates or transfers with assistive devices or assistance; Unable to ambulate or transer.: Yes  Nursing Judgement: Yes          Lines:   Peripheral IV 06/04/24 Distal;Right Cephalic (Active)   Site Assessment Clean, dry & intact 06/04/24 2155   Line Status Blood return noted 06/04/24 2155   Line Care Cap changed 06/04/24 2155   Phlebitis Assessment No symptoms 06/04/24 2155   Infiltration Assessment 0 06/04/24 2155   Alcohol Cap Used No 06/04/24 2155   Dressing Status Clean, dry & intact 06/04/24 2155   Dressing Type Transparent 06/04/24 2155        Opportunity for questions and clarification was provided.      Patient transported with:  Registered Nurse          Umesh Watkins RN  06/04/24 1836

## 2024-06-05 NOTE — PROGRESS NOTES
Hospitalist Progress Note   Admit Date:  2024  5:04 PM   Name:  Chris Anderson   Age:  73 y.o.  Sex:  male  :  1950   MRN:  [id number]   Room:  [facility]/    Presenting/Chief Complaint: Shortness of Breath and Leg Swelling     Reason(s) for Admission: Dehydration [E86.0]  Hyperkalemia [E87.5]  Acute kidney injury (HCC) [N17.9]  Immunosuppressed status (HCC) [D84.9]  Sepsis secondary to UTI (HCC) [A41.9, N39.0]  Elevated lactic acid level [R79.89]     Hospital Course:   Chris Anderson is a 73 y.o. male with medical history of renal transplant 2016 [facility] on CellCept/tacrolimus/prednisone, CAD s/p CABG, A-fib s/p Watchman and pacemaker in place, COPD who presented with dyspnea from nephrology clinic.  He had been recently admitted to [facility]  -  for DKA associated with ESDRAS.  He had a follow-up with nephrology on  and was advised to come to the ED for dyspnea.  Patient admits to dysuria with diffuse abdominal pain and emesisx1 episode.  He still makes some urine.  Denies fever.    In the ED, CXR unremarkable.  Creatinine up to 1.8, bicarb 1 4, sodium 121, potassium 6.1 s/p treatment in the ED came down to 4.9.  Patient met sepsis criteria admission due to lactic acidosis 3.9 and leukocytosis 14.7 K.  UA concerning for UTI.  Patient received Vanco/cefepime in ED.    Patient was admitted with severe sepsis secondary to UTI associated with ESDRAS and CKD stage III associated with metabolic acidosis/lactic acidosis, hyperkalemia, dyspnea.  History of kidney transplant.  Nephrology and pulmonology were consulted.  Patient was started empirically on Rocephin for UTI.  Blood cultures NGTD thus far.  Prompt treatment for hyperkalemia initiated.  Patient was started on bicarb drip.    Subjective & 24hr Events:     Patient alert and oriented x 3.  Stated that he feels a little better compared to when first admitted.  Still with some dyspnea. His significant other at bedside.  No fever, chills,

## 2024-06-05 NOTE — PROGRESS NOTES
Chris Anderson  Admission Date: 6/4/2024         Daily Progress Note: 6/5/2024    The patient's chart is reviewed and the patient is discussed with the staff.    Background: Patient is a 73 y.o.  male seen and evaluated at the request of Dr. Mercado for COPD, dyspnea, and hyperkalemia. He has a PMH of DM2, ESRD s/p renal transplant 2016, immunosuppression (on tacrolimus, prednisone, and cellcept), a-fib s/p watchman, CAD s/p CABG (complicated post-op course with prolonged mechanical ventilation and trach), HLD, HTN, CORAL, GERD, COPD, former smoker 30 pack year history quit in 2014.     Patient was just admitted at [facility], discharged on 6/1, with DKA. He presented to his nephrology o/p appointment on 6/4 with increased SOB, pitting edema, and overall ill appearing per provider. He was sent to the ER for evaluation. He was found to have hyperkalema and early kidney injury. He received duoneb, calcium, dextrose, regular insulin, lokelma, and bicarb IVF. After treatment, K decreased only slightly from 6.1 to 6.0. Lactic acid on arrival was elevated at 3.9, procal elevated at 2.6, WBC mildly elevated at 14.7. UA was positive for small leukocytes. Blood cultures were drawn in ER. Bicarb levels 13. He was initially prepped to be admitted to ICU but was downgraded to non-ICU bed after he improved in the ER.     Subjective:     Resting comfortably in bed. States he is feeling better now. Has some peripheral edema. Abdomen taut and distended. No respiratory distress on room air. No urine output noted in external catheter.     Current Facility-Administered Medications   Medication Dose Route Frequency    traMADol (ULTRAM) tablet 50 mg  50 mg Oral Q6H PRN    methocarbamol (ROBAXIN) tablet 750 mg  750 mg Oral 4x Daily PRN    magnesium sulfate 2000 mg in 50 mL IVPB premix  2,000 mg IntraVENous Once    sodium zirconium cyclosilicate (LOKELMA) oral suspension 10 g  10 g Oral TID    dextrose bolus 10% 250  sterile water 10 mL IV syringe  1,000 mg IntraVENous Q24H    sodium bicarbonate 150 mEq in dextrose 5 % 1,000 mL infusion   IntraVENous Continuous     Review of Systems: Comprehensive ROS negative except in HPI  Objective:   Blood pressure (!) 114/56, pulse 73, temperature 97.7 °F (36.5 °C), temperature source Oral, resp. rate 18, height 1.854 m (6' 1\"), weight 107.3 kg (236 lb 9.6 oz), SpO2 95 %.   Intake/Output Summary (Last 24 hours) at 6/5/2024 0926  Last data filed at 6/4/2024 2134  Gross per 24 hour   Intake 482.61 ml   Output --   Net 482.61 ml     Physical Exam:   Constitutional:  the patient is well developed and in no acute distress  EENMT:  Sclera clear, pupils equal, oral mucosa moist  Respiratory: symmetric chest rise. Fairly clear throughout; on room air   Cardiovascular:  RRR without M,G,R. There is +1 lower extremity edema.  Gastrointestinal: distended and non-tender; with positive bowel sounds.  Musculoskeletal: warm without cyanosis. Normal muscle tone.   Skin:  no jaundice or rashes  Neurologic: symmetric strength, fluent speech  Psychiatric:  calm, appropriate, oriented x 4    Imaging: I performed an independent interpretation of the patient's images.  CXR:   6/4/24      LAB:  Recent Labs     06/04/24 1740 06/04/24 1913 06/05/24  0404   WBC 14.7*  --  12.1*   HGB 12.0*  --  10.5*   HCT 38.0*  --  33.0*     --  182   PROCAL  --  2.60*  --      Recent Labs     06/04/24 1913 06/04/24 1940 06/04/24  2145 06/05/24  0404   *  --   --  122*   K 6.1*  --   --  6.0*   CL 94*  --   --  96*   CO2 14*  --   --  13*   BUN 57*  --   --  61*   CREATININE 1.80* 1.89* 1.49 2.04*   MG 1.5*  --   --  1.5*   BILITOT 2.0*  --   --  2.0*   *  --   --  173*   ALT 75*  --   --  85*   ALKPHOS 870*  --   --  814*     Recent Labs     06/04/24 1913   NTPROBNP 6,384*     Recent Labs     06/04/24 1913 06/05/24  0404   GLUCOSE 168* 153*      Microbiology:   Recent Labs     06/04/24  1804   CULTURE  split/shared evaluation I performed performed a medically appropriate history and exam, counseled and educated the patient and/or family member, documented information in EMR, and coordinated care. which accounted for 20 minutes of clinical time.     CRISTINA Gamboa - NP      In this split/shared evaluation I performed reviewed the patients's H&P, available images, labs, cultures., discussed case in detail with NPP, performed a medically appropriate history and exam, counseled and educated the patient and/or family member, ordered and/or reviewed medications, tests or procedures, documented information in EMR, independently interpreted images, and coordinated care. which accounted for 22 minutes clinical time.     Impression:   Pt with acute on chronic renal failure with hyperkalemia. Awaiting nephrology input.   Reports SOB but satting well on RA. H/o tobacco abuse and COPD. No wheeze on exam. Volume overloaded, likely due to that.   Takes symbicort at home, ordered for her him as well.     Noncontrast CT chest not yet read but small pleural effusions, L pleural calcificcation.   No additional pulmonary input at this time. We will sign off but please let us know if new issues arise.       Del Denson MD

## 2024-06-05 NOTE — PROGRESS NOTES
TRANSFER - IN REPORT:    Verbal report received from DERIK Calvo on Chris Anderson being received from ER  for routine progression of care.     Report consisted of patient’s Situation, Background, Assessment and Recommendations(SBAR).     Information from the following report(s) SBAR, Kardex, ED Summary, MAR, and Recent Results was reviewed. Opportunity for questions and clarification was provided.      Assessment completed upon patient’s arrival to unit and care assumed.     Patient received to room 335. Patient connected to monitor and assessment completed. Plan of care reviewed. Patient oriented to room and call light. Patient aware to use call light to communicate any chest pain or needs.     Admission skin assessment completed with second RN and reveals the following: heels and sacrum intact, no redness present. Bruising to BUE. Healed surgical scar and scattered bruising to abdomen.        4 Eyes Skin Assessment     NAME:  Chris Anderson  YOB: 1950  MEDICAL RECORD NUMBER:  [id number]    The patient is being assessed for  Admission    I agree that at least one RN has performed a thorough Head to Toe Skin Assessment on the patient. ALL assessment sites listed below have been assessed.      Areas assessed by both nurses:    Head, Face, Ears, Shoulders, Back, Chest, Arms, Elbows, Hands, Sacrum. Buttock, Coccyx, Ischium, and Legs. Feet and Heels        Does the Patient have a Wound? No noted wound(s)       Juanito Prevention initiated by RN: Yes  Wound Care Orders initiated by RN: No    Pressure Injury (Stage 3,4, Unstageable, DTI, NWPT, and Complex wounds) if present, place Wound referral order by RN under : No    New Ostomies, if present place, Ostomy referral order under : No     Nurse 1 eSignature: Electronically signed by Emmy Moe RN on 6/5/24 at 3:54 AM EDT    **SHARE this note so that the co-signing nurse can place an eSignature**    Nurse 2 eSignature:

## 2024-06-05 NOTE — CONSULTS
PULMONARY/CRITICAL CARE CONSULT NOTE           6/5/2024    Chris Anderson                        Date of Admission:  6/4/2024    The patient's chart is reviewed and the patient is discussed with the staff.    Subjective:     Patient is a 73 y.o.  male seen and evaluated at the request of Dr. Patrick.  Mr. Anderson is a 73-year-old male with ESRD, CAD (CABG x4/2015), A-fib, COPD, CORAL, patient had renal transplant on 1/11/2016 who is a on tacrolimus and CellCept who came to the ED for worsening of peripheral edema and possible fluid overload.  He was found to have an elevated potassium and early kidney injury.  In the ER he appeared short of breath and restless.    Patient was given DuoNeb, calcium and dextrose and insulin fluids and bicarb.  He also received Lokelma.  Patient's potassium improved and his respiratory status returned to baseline.  Initially he was being prepared to be admitted to the ICU however after treatment and significant improvement patient will be going to a non-ICU bed.  Patient does have an elevated white blood cell count and elevated lactic acid may have an underlying infection (UTI) is currently receiving Rocephin.  This is of concern due to patient's immune suppression.      Review of Systems: Comprehensive ROS negative except in HPI    Current Outpatient Medications   Medication Instructions    albuterol sulfate  (90 Base) MCG/ACT inhaler Inhalation, EVERY 4 HOURS PRN    amLODIPine (NORVASC) 5 mg, Oral, DAILY    aspirin 81 mg, Oral, DAILY    atorvastatin (LIPITOR) 40 mg, Oral    budesonide-formoterol (SYMBICORT) 160-4.5 MCG/ACT AERO 2 puffs, Inhalation, 2 TIMES DAILY    clopidogrel (PLAVIX) 75 mg, Oral, DAILY    folic acid (FOLVITE) 800 mcg, Oral, DAILY    guaiFENesin 1,200 mg, Oral, PRN    insulin regular (HUMULIN R;NOVOLIN R) 4 Units, SubCUTAneous    levothyroxine (SYNTHROID) 100 mcg, Oral, DAILY BEFORE BREAKFAST    melatonin 10 mg, Oral     rise.  Clear to auscultation bilaterally.  Speaking in complete sentences. Lying supine without orthopnea.  Cardiovascular:  RRR without M,G,R. There is 3+ lower extremity edema.  Gastrointestinal: soft and non-tender; with positive bowel sounds.  Musculoskeletal: warm without cyanosis. Normal muscle tone.   Skin: Skin warm pink and dry   neurologic: symmetric strength, fluent speech  Psychiatric:  calm, appropriate, oriented x 4    Imaging: I performed an independent interpretation of the patient's images.  CXR: Single AP portable chest shows no infiltrates or effusions no pneumothorax cardiac silhouette is appears normal sternal wires are noted pacemaker left anterior chest.        Recent Labs     06/04/24  1740 06/04/24  1759 06/04/24  1913 06/04/24  1940 06/04/24  2145   WBC 14.7*  --   --   --   --    HGB 12.0*  --   --   --   --    HCT 38.0*  --   --   --   --      --   --   --   --    NA  --   --  121*  --   --    K  --   --  6.1*  --   --    CL  --   --  94*  --   --    CO2  --   --  14*  --   --    BUN  --   --  57*  --   --    CREATININE  --    < > 1.80* 1.89* 1.49   MG  --   --  1.5*  --   --    BILITOT  --   --  2.0*  --   --    AST  --   --  149*  --   --    ALT  --   --  75*  --   --    ALKPHOS  --   --  870*  --   --    NTPROBNP  --   --  6,384*  --   --     < > = values in this interval not displayed.     ECHO: No results found for this or any previous visit.    MICRO:   Recent Labs     06/04/24  1804   CULTURE PENDING     No results for input(s): \"COVID19\" in the last 72 hours.  Assessment and Plan:  (Medical Decision Making)   73-year-old male renal transplant on tacrolimus (Prograf) who presented with possible fluid overload shortness of breath and hyperkalemia with hyponatremia.  After being treated in the emergency department with the usual hyperkalemia treatment patient showed marked improvement no longer short of breath and no longer hyperkalemic (serum).  Currently patient admitted to

## 2024-06-05 NOTE — PROGRESS NOTES
Notified by RN that potassium 6.1.  Will administer insulin R 10 units/D10 and monitor BG. Hypoglycemic protocol in place. Cont Lokelma and low K diet. Cont BMP check q6h. Cont telemetry monitoring. D/w RN to let on call Nephrology know as well.     Martha Doll, DO

## 2024-06-05 NOTE — CONSULTS
[facility] CONSULT NOTE    Admission Date:  6/4/2024    Admission Diagnosis:  Dehydration [E86.0]  Hyperkalemia [E87.5]  Acute kidney injury (HCC) [N17.9]  Immunosuppressed status (HCC) [D84.9]  Sepsis secondary to UTI (HCC) [A41.9, N39.0]  Elevated lactic acid level [R79.89]    Consulting physician: Isela  Reason for consult: ESDRAS, renal transplant    Subjective:   History of Present Illness: This is a pleasant gentleman with a PMH of DM2, ESRD s/p renal transplant 2016, immunosuppression (on tacrolimus, prednisone, and cellcept), a-fib s/p watchman, CAD s/p CABG (complicated post-op course with prolonged mechanical ventilation and trach), HLD, HTN, CORAL, GERD, COPD, former smoker 30 pack year history quit in 2014. He is followed in the [facility] office by Dr Upton and Capri Campuzano.     Patient was just admitted at [facility], discharged on 6/1, with DKA. He was seen by [facility] on 6/4 with increased SOB, pitting edema, and generally sick. He was sent to the ER for evaluation. He was found to have hyperkalema and early kidney injury. He has a significant lactic acidosis, UTI. Initially received Lasix but has since gotten IV fluids. He is on antibiotics. Seems nonoliguric. Creatinine rising daily.     Past Medical History:   Diagnosis Date    Anemia     Arrhythmia     post CABG A fib/flutter leading to sick sinus syndrome- pace maker    Brain bleed ([facility]) 05/2018    due to fall- treated with Vitamin K    Brain bleed (HCC) 2018    resulting from fall with head injury while on coumadin    Bronchial pneumonia     CAD (coronary artery disease) 2015    CABG    Carotid artery stenosis 06/29/2015    ANEL <50%, LICA 50%    Chronic kidney disease     renal transplant-2016    Chronic obstructive pulmonary disease (HCC)     mild    Chronic pain     GERD (gastroesophageal reflux disease)     controlled with medication    History of echocardiogram 07/05/2019    mild concentric ventrucular hypertrophy, LVEF 45-50%, grade1 left  96*   CO2 14* 13*   BUN 57* 61*   MG 1.5* 1.5*     No results for input(s): \"PH\", \"PCO2\", \"PO2\" in the last 72 hours.    Problem List:     Patient Active Problem List    Diagnosis Date Noted    Dyspnea 06/05/2024    Immunosuppressed status ([facility]) 06/05/2024    Volume overload 06/05/2024    Hyperkalemia 06/04/2024    Elevated liver enzymes 06/04/2024    Sepsis secondary to UTI ([facility]) 06/04/2024    UTI (urinary tract infection) 06/04/2024    Metabolic acidosis 06/04/2024    Neuropathy 12/19/2019    PAD (peripheral artery disease) ([facility]) 12/19/2019    CAD (coronary artery disease) 12/19/2019    Heel ulcer ([facility]) 12/19/2019    H/O coronary artery bypass surgery 12/19/2019    DM (diabetes mellitus) ([facility]) 12/19/2019    A-fib ([facility]) 12/19/2019    Kidney transplant recipient 12/19/2019    COPD (chronic obstructive pulmonary disease) ([facility]) 12/19/2019       Assessment and Plan:    This is a pleasant gentleman with:    1) Sepsis with a probable urinary tract source. Cultures are pending and he is on broad spectrum antibiotics. He has received fluids but remains clinically dry. Will continue IV fluids, increase rate some. Bolus as needed. Will continue to hold Cellcept but restart Prograf. Check a Prograf level this evening.     2) ESDRAS - most likely secondary to the above and volume mediated. Follow renal indices with improved hemodynamics. If he does continue to decline ha may have devolved to ATN.     3) Chronic immunosuppression - holding Cellcept, continue Prograf and Prednisone. Would give IV steroids for 3 doses.     Thank you for the kind courtesy of this consultation. Will make further adjustments to the medical regimen as the clinical course dictates and follow very closely with you.    PURNIMA LAWSON MD

## 2024-06-05 NOTE — CARE COORDINATION
Patient admitted with hyperkalemia. History of renal transplant 2016. Nephology consulted.   CM met with patient for assessment. A& O X 4. Verified PCP, demographic, and insurance with affordable prescriptions. Patient reports he sees his PCP every 3 months. Patient lives with his GF in a house with 4 SAMUEL. Uses a cane due to DM neuropathy. No other DME in the home. No history home health or STR.   CM will follow clinicals to assist with transition of care needs.     06/05/24 0969   Service Assessment   Patient Orientation Alert and Oriented   Cognition Alert   History Provided By Patient   Primary Caregiver Self   Accompanied By/Relationship No one at bedside   Support Systems Spouse/Significant Other;Children   PCP Verified by CM Yes  (Patricia Cosme)   Last Visit to PCP Within last 3 months   Prior Functional Level Independent in ADLs/IADLs   Current Functional Level Independent in ADLs/IADLs   Can patient return to prior living arrangement Yes   Ability to make needs known: Good   Family able to assist with home care needs: Yes   Would you like for me to discuss the discharge plan with any other family members/significant others, and if so, who? No   Financial Resources Medicare   Social/Functional History   Lives With Significant other   Type of Home House   Home Equipment None   Receives Help From Family   ADL Assistance Independent   Homemaking Assistance Independent   Ambulation Assistance Independent   Transfer Assistance Independent   Active  Yes   Mode of Transportation Car   Occupation Retired   Discharge Planning   Type of Residence House   Living Arrangements Spouse/Significant Other   Current Services Prior To Admission None   Potential Assistance Needed N/A   DME Ordered? No   Potential Assistance Purchasing Medications No   Type of Home Care Services None   Patient expects to be discharged to: House   Services At/After Discharge   Transition of Care Consult (CM Consult) Discharge

## 2024-06-05 NOTE — H&P
Hospitalist History and Physical   Admit Date:  2024  5:04 PM   Name:  Chris Anderson   Age:  73 y.o.  Sex:  male  :  1950   MRN:  [id number]   Room:  [place] [address]    Presenting/Chief Complaint: Shortness of Breath and Leg Swelling     Reason(s) for Admission: Hyperkalemia [E87.5]     History of Present Illness:       Chris Anderson is a 73 y.o. male with medical history of renal transplant 2016 [facility], on cellcept/tacrolimus/ prednisone, CAD s/p CABG, afib, s/p watchamn, s/p PPM, DM2, COPD , PAD evaluated with shortness of breath from nephrology clinic. He had been admitted to [facility]  to 24 for DKA, ESDRAS. Creatinine had improved at day of discharge. Hyperkalemia treated.   He had been evaluated today for nephrology follow.  I did speak with referring provide Doreen Campuzano prior to him coming to the ED  He was short of breath with some distress in the office    CXR negative  O2 sat ok  Visibly short of breath  Uses inhalers      Creatinine up to 1.8   Bicarb 14   on repeat lab  POC potassium was 7, recheck 6.1 s/p treatment in ED now 4.9    EKG not located      Meets sepsis criteria due to lactic acidosis 3.9, leukocytosis 14.7K,   UA positive   S/p vancomycin and rocephin       Admits to dysuria, making some urine, no fever, eating, had diffuse abd pain, emesis x 1, weak       FULL CODE  Wife Andreia      Assessment & Plan:     Principal Problem:    Hyperkalemia  Plan:   Initially admitted to ICU though potassium now 4.9  I discussed with Dr. Marlene dalal on call- ok for bicarb drip  Trend lab every 6 hours               Metabolic acidosis  Plan:     Kidney transplant recipient  Plan:   ESDRAS:  Renal function progressively improved over evening   I spoke with Dr. Rosario who recommends holding cellcept, prograf  Check prograft level   Check urine sodium             Metabolic acidosis:  Bicarb drip 100 cc/hr   Trend BMP every 6 hours               Hyponatremia:     Trend BMP    Result Value Ref Range    NT Pro-BNP 6,384 (H) 0 - 125 PG/ML   Procalcitonin    Collection Time: 06/04/24  7:13 PM   Result Value Ref Range    Procalcitonin 2.60 (H) 0.00 - 0.10 ng/mL   Troponin    Collection Time: 06/04/24  7:13 PM   Result Value Ref Range    Troponin T 65.0 (H) 0 - 22 ng/L   POCT Glucose    Collection Time: 06/04/24  7:24 PM   Result Value Ref Range    POC Glucose 134 (H) 65 - 100 mg/dL    Performed by: Sara    POC CHEM 8    Collection Time: 06/04/24  7:40 PM   Result Value Ref Range    POC Sodium 122 (L) 136 - 145 mmol/L    POC Potassium 6.1 (H) 3.5 - 5.1 mmol/L    POC Chloride 95 (L) 98 - 107 mmol/L    POC TCO2 15.0 (L) 21 - 32 mmol/L    Anion Gap, POC 12 mmol/L    POC Glucose 128 (H) 65 - 100 mg/dL    POC BUN 48 (H) 8 - 26 mg/dL    POC Creatinine 1.89 (H) 0.8 - 1.5 mg/dL    eGFR, POC 37 (L) >60 ml/min/1.73m2   Troponin    Collection Time: 06/04/24  7:50 PM   Result Value Ref Range    Troponin T 68.0 (H) 0 - 22 ng/L   POC CHEM 8    Collection Time: 06/04/24  9:45 PM   Result Value Ref Range    POC Sodium 128 (L) 136 - 145 mmol/L    POC Potassium 4.9 3.5 - 5.1 mmol/L    POC Chloride 103 98 - 107 mmol/L    POC TCO2 11.8 (L) 21 - 32 mmol/L    Anion Gap, POC 13.2 mmol/L    POC Glucose 80 65 - 100 mg/dL    POC BUN 40 (H) 8 - 26 mg/dL    POC Creatinine 1.49 0.8 - 1.5 mg/dL    eGFR, POC 50 (L) >60 ml/min/1.73m2   POCT Glucose    Collection Time: 06/04/24  9:48 PM   Result Value Ref Range    POC Glucose 86 65 - 100 mg/dL    Performed by: Ramin    Lactic Acid    Collection Time: 06/04/24  9:59 PM   Result Value Ref Range    Lactic Acid 3.9 (HH) 0.5 - 2.0 mmol/L   Arterial Blood Gas, POC    Collection Time: 06/04/24 10:34 PM   Result Value Ref Range    DEVICE ROOM AIR      POC pH 7.38 7.35 - 7.45      POC pCO2 22.5 (L) 35 - 45 MMHG    POC PO2 90 75 - 100 MMHG    POC HCO3 13.3 (L) 22 - 26 MMOL/L    POC O2 SAT 97.0 95 - 98 %    Base Deficit (POC) 10.3 mmol/L    POC Kraig's Test

## 2024-06-05 NOTE — ED NOTES
EPOC results    Na 128  K+ 4.9  Cl 103  TCO2 11.8    Glucose 80  BUN 40  Cr 1.49     Umesh Watkins, RN  06/04/24 9436

## 2024-06-05 NOTE — PROGRESS NOTES
Changed to BID and Q4PRN per bronchodilator protocol.       06/05/24 1255   RT Protocol   History Pulmonary Disease 2   Respiratory pattern 0   Breath sounds 2   Cough 1   Indications for Bronchodilator Therapy On home bronchodilators   Bronchodilator Assessment Score 5

## 2024-06-05 NOTE — ED NOTES
TRANSFER - OUT REPORT:    Verbal report given to Alison MORE on Chris Anderson  being transferred to [facility] for routine progression of patient care       Report consisted of patient's Situation, Background, Assessment and   Recommendations(SBAR).     Information from the following report(s) Nurse Handoff Report was reviewed with the receiving nurse.    Herrick Fall Assessment:    Presents to emergency department  because of falls (Syncope, seizure, or loss of consciousness): No  Age > 70: Yes  Altered Mental Status, Intoxication with alcohol or substance confusion (Disorientation, impaired judgment, poor safety awaremess, or inability to follow instructions): No  Impaired Mobility: Ambulates or transfers with assistive devices or assistance; Unable to ambulate or transer.: Yes  Nursing Judgement: Yes          Lines:   Peripheral IV 06/04/24 Distal;Right Cephalic (Active)   Site Assessment Clean, dry & intact 06/04/24 2155   Line Status Blood return noted 06/04/24 2155   Line Care Cap changed 06/04/24 2155   Phlebitis Assessment No symptoms 06/04/24 2155   Infiltration Assessment 0 06/04/24 2155   Alcohol Cap Used No 06/04/24 2155   Dressing Status Clean, dry & intact 06/04/24 2155   Dressing Type Transparent 06/04/24 2155        Opportunity for questions and clarification was provided.      Patient transported with:  Registered Nurse          Umesh Watkins RN  06/04/24 0184

## 2024-06-06 ENCOUNTER — APPOINTMENT (OUTPATIENT)
Dept: ULTRASOUND IMAGING | Age: 74
DRG: 872 | End: 2024-06-06
Payer: MEDICARE

## 2024-06-06 ENCOUNTER — APPOINTMENT (OUTPATIENT)
Dept: GENERAL RADIOLOGY | Age: 74
DRG: 872 | End: 2024-06-06
Payer: MEDICARE

## 2024-06-06 PROBLEM — E87.20 LACTIC ACIDOSIS: Status: ACTIVE | Noted: 2024-06-06

## 2024-06-06 LAB
ALBUMIN SERPL-MCNC: 2.1 G/DL (ref 3.2–4.6)
ALBUMIN SERPL-MCNC: 2.3 G/DL (ref 3.2–4.6)
ALBUMIN/GLOB SERPL: 1.1 (ref 1–1.9)
ALBUMIN/GLOB SERPL: 1.2 (ref 1–1.9)
ALP SERPL-CCNC: 674 U/L (ref 40–129)
ALP SERPL-CCNC: 720 U/L (ref 40–129)
ALT SERPL-CCNC: 84 U/L (ref 12–65)
ALT SERPL-CCNC: 92 U/L (ref 12–65)
ANION GAP SERPL CALC-SCNC: 14 MMOL/L (ref 9–18)
ANION GAP SERPL CALC-SCNC: 15 MMOL/L (ref 9–18)
ANION GAP SERPL CALC-SCNC: 16 MMOL/L (ref 9–18)
ANION GAP SERPL CALC-SCNC: 18 MMOL/L (ref 9–18)
AST SERPL-CCNC: 109 U/L (ref 15–37)
AST SERPL-CCNC: 112 U/L (ref 15–37)
B PERT DNA SPEC QL NAA+PROBE: NOT DETECTED
BILIRUB SERPL-MCNC: 2.4 MG/DL (ref 0–1.2)
BILIRUB SERPL-MCNC: 2.5 MG/DL (ref 0–1.2)
BORDETELLA PARAPERTUSSIS BY PCR: NOT DETECTED
BUN SERPL-MCNC: 68 MG/DL (ref 8–23)
BUN SERPL-MCNC: 70 MG/DL (ref 8–23)
BUN SERPL-MCNC: 71 MG/DL (ref 8–23)
BUN SERPL-MCNC: 71 MG/DL (ref 8–23)
C PNEUM DNA SPEC QL NAA+PROBE: NOT DETECTED
CALCIUM SERPL-MCNC: 7.6 MG/DL (ref 8.8–10.2)
CALCIUM SERPL-MCNC: 7.6 MG/DL (ref 8.8–10.2)
CALCIUM SERPL-MCNC: 7.7 MG/DL (ref 8.8–10.2)
CALCIUM SERPL-MCNC: 7.9 MG/DL (ref 8.8–10.2)
CHLORIDE SERPL-SCNC: 87 MMOL/L (ref 98–107)
CHLORIDE SERPL-SCNC: 90 MMOL/L (ref 98–107)
CHLORIDE SERPL-SCNC: 91 MMOL/L (ref 98–107)
CHLORIDE SERPL-SCNC: 91 MMOL/L (ref 98–107)
CO2 SERPL-SCNC: 12 MMOL/L (ref 20–28)
CO2 SERPL-SCNC: 15 MMOL/L (ref 20–28)
CO2 SERPL-SCNC: 16 MMOL/L (ref 20–28)
CO2 SERPL-SCNC: 17 MMOL/L (ref 20–28)
CREAT SERPL-MCNC: 2.63 MG/DL (ref 0.8–1.3)
CREAT SERPL-MCNC: 2.81 MG/DL (ref 0.8–1.3)
CREAT SERPL-MCNC: 2.84 MG/DL (ref 0.8–1.3)
CREAT SERPL-MCNC: 2.85 MG/DL (ref 0.8–1.3)
CREAT UR-MCNC: 147 MG/DL (ref 39–259)
ERYTHROCYTE [DISTWIDTH] IN BLOOD BY AUTOMATED COUNT: 17.2 % (ref 11.9–14.6)
FLUAV SUBTYP SPEC NAA+PROBE: NOT DETECTED
FLUBV RNA SPEC QL NAA+PROBE: NOT DETECTED
GLOBULIN SER CALC-MCNC: 2 G/DL (ref 2.3–3.5)
GLOBULIN SER CALC-MCNC: 2 G/DL (ref 2.3–3.5)
GLUCOSE BLD STRIP.AUTO-MCNC: 297 MG/DL (ref 65–100)
GLUCOSE BLD STRIP.AUTO-MCNC: 310 MG/DL (ref 65–100)
GLUCOSE BLD STRIP.AUTO-MCNC: 314 MG/DL (ref 65–100)
GLUCOSE BLD STRIP.AUTO-MCNC: 321 MG/DL (ref 65–100)
GLUCOSE SERPL-MCNC: 346 MG/DL (ref 70–99)
GLUCOSE SERPL-MCNC: 347 MG/DL (ref 70–99)
GLUCOSE SERPL-MCNC: 349 MG/DL (ref 70–99)
GLUCOSE SERPL-MCNC: 373 MG/DL (ref 70–99)
HADV DNA SPEC QL NAA+PROBE: NOT DETECTED
HCOV 229E RNA SPEC QL NAA+PROBE: NOT DETECTED
HCOV HKU1 RNA SPEC QL NAA+PROBE: NOT DETECTED
HCOV NL63 RNA SPEC QL NAA+PROBE: NOT DETECTED
HCOV OC43 RNA SPEC QL NAA+PROBE: NOT DETECTED
HCT VFR BLD AUTO: 31.9 % (ref 41.1–50.3)
HGB BLD-MCNC: 10.4 G/DL (ref 13.6–17.2)
HMPV RNA SPEC QL NAA+PROBE: NOT DETECTED
HPIV1 RNA SPEC QL NAA+PROBE: NOT DETECTED
HPIV2 RNA SPEC QL NAA+PROBE: NOT DETECTED
HPIV3 RNA SPEC QL NAA+PROBE: NOT DETECTED
HPIV4 RNA SPEC QL NAA+PROBE: NOT DETECTED
LACTATE SERPL-SCNC: 3.7 MMOL/L (ref 0.5–2)
LACTATE SERPL-SCNC: 3.8 MMOL/L (ref 0.5–2)
LACTATE SERPL-SCNC: 4.7 MMOL/L (ref 0.5–2)
LACTATE SERPL-SCNC: 5 MMOL/L (ref 0.5–2)
M PNEUMO DNA SPEC QL NAA+PROBE: NOT DETECTED
MCH RBC QN AUTO: 22.8 PG (ref 26.1–32.9)
MCHC RBC AUTO-ENTMCNC: 32.6 G/DL (ref 31.4–35)
MCV RBC AUTO: 69.8 FL (ref 82–102)
NRBC # BLD: 0 K/UL (ref 0–0.2)
OSMOLALITY UR: 314 MOSM/KG H2O (ref 50–1400)
PLATELET # BLD AUTO: 153 K/UL (ref 150–450)
PMV BLD AUTO: 10.4 FL (ref 9.4–12.3)
POTASSIUM SERPL-SCNC: 5.2 MMOL/L (ref 3.5–5.1)
POTASSIUM SERPL-SCNC: 5.2 MMOL/L (ref 3.5–5.1)
POTASSIUM SERPL-SCNC: 5.3 MMOL/L (ref 3.5–5.1)
POTASSIUM SERPL-SCNC: 5.7 MMOL/L (ref 3.5–5.1)
PROCALCITONIN SERPL-MCNC: 3.06 NG/ML (ref 0–0.1)
PROCALCITONIN SERPL-MCNC: 3.19 NG/ML (ref 0–0.1)
PROT SERPL-MCNC: 4.2 G/DL (ref 6.3–8.2)
PROT SERPL-MCNC: 4.2 G/DL (ref 6.3–8.2)
PROT UR-MCNC: 65 MG/DL
PROT/CREAT UR-RTO: 0.4
RBC # BLD AUTO: 4.57 M/UL (ref 4.23–5.6)
RSV RNA SPEC QL NAA+PROBE: NOT DETECTED
RV+EV RNA SPEC QL NAA+PROBE: NOT DETECTED
SARS-COV-2 RNA RESP QL NAA+PROBE: NOT DETECTED
SERVICE CMNT-IMP: ABNORMAL
SODIUM SERPL-SCNC: 118 MMOL/L (ref 136–145)
SODIUM SERPL-SCNC: 120 MMOL/L (ref 136–145)
SODIUM SERPL-SCNC: 122 MMOL/L (ref 136–145)
SODIUM SERPL-SCNC: 123 MMOL/L (ref 136–145)
TACROLIMUS BLD-MCNC: 15.2 NG/ML (ref 2–20)
TACROLIMUS BLOOD: NORMAL
VANCOMYCIN SERPL-MCNC: 11.1 UG/ML
VANCOMYCIN TROUGH SERPL-MCNC: 11.6 UG/ML (ref 10–20)
WBC # BLD AUTO: 15.3 K/UL (ref 4.3–11.1)

## 2024-06-06 PROCEDURE — 0202U NFCT DS 22 TRGT SARS-COV-2: CPT

## 2024-06-06 PROCEDURE — 80202 ASSAY OF VANCOMYCIN: CPT

## 2024-06-06 PROCEDURE — 6370000000 HC RX 637 (ALT 250 FOR IP): Performed by: FAMILY MEDICINE

## 2024-06-06 PROCEDURE — 99291 CRITICAL CARE FIRST HOUR: CPT | Performed by: INTERNAL MEDICINE

## 2024-06-06 PROCEDURE — 76705 ECHO EXAM OF ABDOMEN: CPT | Performed by: RADIOLOGY

## 2024-06-06 PROCEDURE — 6370000000 HC RX 637 (ALT 250 FOR IP): Performed by: HOSPITALIST

## 2024-06-06 PROCEDURE — 82570 ASSAY OF URINE CREATININE: CPT

## 2024-06-06 PROCEDURE — 6360000002 HC RX W HCPCS: Performed by: FAMILY MEDICINE

## 2024-06-06 PROCEDURE — 6370000000 HC RX 637 (ALT 250 FOR IP): Performed by: INTERNAL MEDICINE

## 2024-06-06 PROCEDURE — 84145 PROCALCITONIN (PCT): CPT

## 2024-06-06 PROCEDURE — 36415 COLL VENOUS BLD VENIPUNCTURE: CPT

## 2024-06-06 PROCEDURE — 6360000002 HC RX W HCPCS: Performed by: INTERNAL MEDICINE

## 2024-06-06 PROCEDURE — 87641 MR-STAPH DNA AMP PROBE: CPT

## 2024-06-06 PROCEDURE — 2580000003 HC RX 258: Performed by: INTERNAL MEDICINE

## 2024-06-06 PROCEDURE — 2580000003 HC RX 258: Performed by: HOSPITALIST

## 2024-06-06 PROCEDURE — 51702 INSERT TEMP BLADDER CATH: CPT

## 2024-06-06 PROCEDURE — 94640 AIRWAY INHALATION TREATMENT: CPT

## 2024-06-06 PROCEDURE — 82962 GLUCOSE BLOOD TEST: CPT

## 2024-06-06 PROCEDURE — 71045 X-RAY EXAM CHEST 1 VIEW: CPT

## 2024-06-06 PROCEDURE — 2500000003 HC RX 250 WO HCPCS: Performed by: HOSPITALIST

## 2024-06-06 PROCEDURE — 83605 ASSAY OF LACTIC ACID: CPT

## 2024-06-06 PROCEDURE — 76705 ECHO EXAM OF ABDOMEN: CPT

## 2024-06-06 PROCEDURE — 85027 COMPLETE CBC AUTOMATED: CPT

## 2024-06-06 PROCEDURE — 51798 US URINE CAPACITY MEASURE: CPT

## 2024-06-06 PROCEDURE — 80053 COMPREHEN METABOLIC PANEL: CPT

## 2024-06-06 PROCEDURE — 74018 RADEX ABDOMEN 1 VIEW: CPT

## 2024-06-06 PROCEDURE — 84156 ASSAY OF PROTEIN URINE: CPT

## 2024-06-06 PROCEDURE — 83935 ASSAY OF URINE OSMOLALITY: CPT

## 2024-06-06 PROCEDURE — 2000000000 HC ICU R&B

## 2024-06-06 RX ORDER — PREGABALIN 25 MG/1
25 CAPSULE ORAL 2 TIMES DAILY
Status: DISCONTINUED | OUTPATIENT
Start: 2024-06-06 | End: 2024-06-11 | Stop reason: HOSPADM

## 2024-06-06 RX ORDER — SODIUM CHLORIDE 9 MG/ML
INJECTION, SOLUTION INTRAVENOUS CONTINUOUS
Status: ACTIVE | OUTPATIENT
Start: 2024-06-06 | End: 2024-06-06

## 2024-06-06 RX ORDER — 0.9 % SODIUM CHLORIDE 0.9 %
1000 INTRAVENOUS SOLUTION INTRAVENOUS ONCE
Status: COMPLETED | OUTPATIENT
Start: 2024-06-06 | End: 2024-06-06

## 2024-06-06 RX ORDER — SODIUM CHLORIDE 1 G/1
2 TABLET ORAL
Status: DISPENSED | OUTPATIENT
Start: 2024-06-06 | End: 2024-06-09

## 2024-06-06 RX ADMIN — INSULIN LISPRO 2 UNITS: 100 INJECTION, SOLUTION INTRAVENOUS; SUBCUTANEOUS at 12:11

## 2024-06-06 RX ADMIN — SODIUM BICARBONATE: 84 INJECTION, SOLUTION INTRAVENOUS at 06:21

## 2024-06-06 RX ADMIN — PIPERACILLIN AND TAZOBACTAM 3375 MG: 3; .375 INJECTION, POWDER, LYOPHILIZED, FOR SOLUTION INTRAVENOUS at 21:41

## 2024-06-06 RX ADMIN — CLOPIDOGREL BISULFATE 75 MG: 75 TABLET ORAL at 20:48

## 2024-06-06 RX ADMIN — SODIUM ZIRCONIUM CYCLOSILICATE 10 G: 10 POWDER, FOR SUSPENSION ORAL at 08:24

## 2024-06-06 RX ADMIN — BUDESONIDE AND FORMOTEROL FUMARATE DIHYDRATE 2 PUFF: 160; 4.5 AEROSOL RESPIRATORY (INHALATION) at 19:46

## 2024-06-06 RX ADMIN — ASPIRIN 81 MG 81 MG: 81 TABLET ORAL at 08:23

## 2024-06-06 RX ADMIN — HYDROCORTISONE SODIUM SUCCINATE 50 MG: 100 INJECTION, POWDER, FOR SOLUTION INTRAMUSCULAR; INTRAVENOUS at 18:50

## 2024-06-06 RX ADMIN — ATORVASTATIN CALCIUM 40 MG: 40 TABLET, FILM COATED ORAL at 20:48

## 2024-06-06 RX ADMIN — PIPERACILLIN AND TAZOBACTAM 4500 MG: 4; .5 INJECTION, POWDER, FOR SOLUTION INTRAVENOUS at 08:57

## 2024-06-06 RX ADMIN — LEVOTHYROXINE SODIUM 100 MCG: 0.1 TABLET ORAL at 06:14

## 2024-06-06 RX ADMIN — Medication 2 G: at 08:23

## 2024-06-06 RX ADMIN — PIPERACILLIN AND TAZOBACTAM 3375 MG: 3; .375 INJECTION, POWDER, LYOPHILIZED, FOR SOLUTION INTRAVENOUS at 14:00

## 2024-06-06 RX ADMIN — HYDROCORTISONE SODIUM SUCCINATE 50 MG: 100 INJECTION, POWDER, FOR SOLUTION INTRAMUSCULAR; INTRAVENOUS at 10:42

## 2024-06-06 RX ADMIN — HYDROCORTISONE SODIUM SUCCINATE 100 MG: 100 INJECTION, POWDER, FOR SOLUTION INTRAMUSCULAR; INTRAVENOUS at 03:52

## 2024-06-06 RX ADMIN — SODIUM ZIRCONIUM CYCLOSILICATE 10 G: 10 POWDER, FOR SUSPENSION ORAL at 13:30

## 2024-06-06 RX ADMIN — PANTOPRAZOLE SODIUM 40 MG: 40 TABLET, DELAYED RELEASE ORAL at 06:14

## 2024-06-06 RX ADMIN — INSULIN LISPRO 4 UNITS: 100 INJECTION, SOLUTION INTRAVENOUS; SUBCUTANEOUS at 21:44

## 2024-06-06 RX ADMIN — SODIUM BICARBONATE: 84 INJECTION, SOLUTION INTRAVENOUS at 22:08

## 2024-06-06 RX ADMIN — PREDNISONE 5 MG: 5 TABLET ORAL at 08:24

## 2024-06-06 RX ADMIN — SODIUM CHLORIDE, PRESERVATIVE FREE 10 ML: 5 INJECTION INTRAVENOUS at 20:52

## 2024-06-06 RX ADMIN — ONDANSETRON 4 MG: 2 INJECTION INTRAMUSCULAR; INTRAVENOUS at 12:07

## 2024-06-06 RX ADMIN — PREGABALIN 25 MG: 25 CAPSULE ORAL at 12:11

## 2024-06-06 RX ADMIN — SODIUM CHLORIDE 1000 ML: 9 INJECTION, SOLUTION INTRAVENOUS at 10:48

## 2024-06-06 RX ADMIN — Medication 2 G: at 12:11

## 2024-06-06 RX ADMIN — TRAMADOL HYDROCHLORIDE 50 MG: 50 TABLET ORAL at 09:41

## 2024-06-06 RX ADMIN — FOLIC ACID 1000 MCG: 1 TABLET ORAL at 08:24

## 2024-06-06 RX ADMIN — TACROLIMUS 1 MG: 1 CAPSULE ORAL at 08:23

## 2024-06-06 RX ADMIN — SODIUM CHLORIDE: 9 INJECTION, SOLUTION INTRAVENOUS at 07:59

## 2024-06-06 RX ADMIN — SODIUM CHLORIDE, PRESERVATIVE FREE 10 ML: 5 INJECTION INTRAVENOUS at 08:29

## 2024-06-06 RX ADMIN — TACROLIMUS 1 MG: 1 CAPSULE ORAL at 20:48

## 2024-06-06 RX ADMIN — SODIUM ZIRCONIUM CYCLOSILICATE 10 G: 10 POWDER, FOR SUSPENSION ORAL at 20:50

## 2024-06-06 RX ADMIN — ALBUTEROL SULFATE 2.5 MG: 2.5 SOLUTION RESPIRATORY (INHALATION) at 08:12

## 2024-06-06 RX ADMIN — SODIUM CHLORIDE: 9 INJECTION, SOLUTION INTRAVENOUS at 09:15

## 2024-06-06 RX ADMIN — BUDESONIDE AND FORMOTEROL FUMARATE DIHYDRATE 2 PUFF: 160; 4.5 AEROSOL RESPIRATORY (INHALATION) at 08:13

## 2024-06-06 RX ADMIN — Medication 2 G: at 18:49

## 2024-06-06 RX ADMIN — INSULIN LISPRO 3 UNITS: 100 INJECTION, SOLUTION INTRAVENOUS; SUBCUTANEOUS at 08:24

## 2024-06-06 RX ADMIN — ALBUTEROL SULFATE 2.5 MG: 2.5 SOLUTION RESPIRATORY (INHALATION) at 19:46

## 2024-06-06 RX ADMIN — INSULIN LISPRO 3 UNITS: 100 INJECTION, SOLUTION INTRAVENOUS; SUBCUTANEOUS at 18:46

## 2024-06-06 RX ADMIN — PREGABALIN 25 MG: 25 CAPSULE ORAL at 20:47

## 2024-06-06 RX ADMIN — VANCOMYCIN HYDROCHLORIDE 1500 MG: 10 INJECTION, POWDER, LYOPHILIZED, FOR SOLUTION INTRAVENOUS at 18:57

## 2024-06-06 RX ADMIN — SODIUM BICARBONATE: 84 INJECTION, SOLUTION INTRAVENOUS at 13:34

## 2024-06-06 ASSESSMENT — PAIN SCALES - GENERAL
PAINLEVEL_OUTOF10: 8
PAINLEVEL_OUTOF10: 0
PAINLEVEL_OUTOF10: 3
PAINLEVEL_OUTOF10: 0

## 2024-06-06 ASSESSMENT — PAIN DESCRIPTION - LOCATION: LOCATION: LEG;SHOULDER

## 2024-06-06 NOTE — PROGRESS NOTES
Critic care note written in retrospect of events:    Called to patients bedside as Patient at risk for further deterioration from severe sepsis with persistently elevated lactic acidosis despite abx & fluids.    Patient required critical care interventions including: bedside evaluation, organizing care  He endorses bowel distention & platypnea.   He overall feels that the distention is improved.   He had a BM and gas passage yesterday;   He does not take any metformin at home; denies any recent alcohol use- did previously drink heavily but none in awhile.   Suspect lactic acidosis a component of sepsis and some liver impairment. He has previously gotten fluid bolus but does not appear adequate for degree of lactic acidosis and body weight. Na corrects to 125.      Will bolus per sepsis protocol- will start with 2L and see how he tolerates but ideally needs ~ 3,126ml.   Broaden abx to vanc and zosyn given recent Janessa hospitalization   Mrsa swab if not done  Transfer to the icu in case he needs antionette pressor support and cannot tolerate sufficient fluid bolus  Continue steroids  for another 24 hours  Liver ultrasound  Only minimal ascitic fluid on ct 06/05/24 but w/o contrast   Bl pleural effusion noted and he may eventually require diuresis.   Procaltionin  Low threshold for surgical input although he denies abdominal pain.  Further w/up anf mgt based on his clinical course   Pulm and renal already following     Total critical care time spent: 45 min    Critical care time includes time spent at bedside performing history and exam, performing chart review, discussing findings and treatment plan with patient and/or family, discussing patient with consultants and colleagues, ordering and reviewing pertinent laboratory and radiographic evaluations, and discussing patient with nursing staff. Time excludes procedures.

## 2024-06-06 NOTE — PROGRESS NOTES
VANCO DAILY FOLLOW UP RENAL INSUFFICIENCY PATIENT   [facility]   Pharmacy Pharmacokinetic Monitoring Service - Vancomycin    Consulting Provider: Dr. Aguilar   Indication: sepsis  Target Concentration: Random level <= 20 mg/L  Day of Therapy: 3 of 7  Additional Antimicrobials: pip/tazo    Pertinent Laboratory Values:   Wt Readings from Last 1 Encounters:   06/05/24 107.3 kg (236 lb 9.6 oz)     Temp Readings from Last 1 Encounters:   06/06/24 97.3 °F (36.3 °C) (Oral)     Recent Labs       0000 06/04/24  1740 06/04/24  1759 06/04/24  1804 06/04/24  1913 06/04/24  1940 06/05/24  0404 06/05/24  0938 06/05/24  2151 06/06/24  0016 06/06/24  0346 06/06/24  0631   BUN   < >  --   --   --  57*  --  61*  --  67*  --  68*  --    CREATININE   < >  --    < >  --  1.80*   < > 2.04*  --  2.49*  --  2.63*  --    WBC  --  14.7*  --   --   --   --  12.1*  --   --   --  15.3*  --    PROCAL  --   --   --   --  2.60*  --   --   --   --   --   --   --    LACTA  --   --   --   --   --    < > 4.5*   < > 4.2* 3.7* 3.8* 5.0*   LACTSEPSIS  --   --   --  3.6* 3.9*  --   --   --   --   --   --   --     < > = values in this interval not displayed.       No results found for: \"VANCOTROUGH\", \"VANCORANDOM\"    MRSA Nasal Swab: N/A. Non-respiratory infection    Assessment:  Date:  Dose/Freq Admin Times Level/Time:   6/4 2500 mg x 1 2023 6/5 6/6   Rd @ 0900 =                 Plan:  Concentration-guided dosing due to renal impairment  Given vancomycin 2500 mg IV x 1 on 6/4. Check level prior to re-dosing  Repeat vancomycin concentrations will be ordered as clinically appropriate  Pharmacy will continue to monitor patient and adjust therapy as indicated    Thank you for the consult,  Vero Oscar RPH

## 2024-06-06 NOTE — PROGRESS NOTES
DEVAUGHN DAILY FOLLOW UP RENAL INSUFFICIENCY PATIENT   [facility]   Pharmacy Pharmacokinetic Monitoring Service - Vancomycin    Consulting Provider: Dr. Aguilar   Indication: sepsis  Target Concentration: Random level <= 20 mg/L  Day of Therapy: 3 of 7  Additional Antimicrobials: piperacillin-tazobactam    Pertinent Laboratory Values:   Wt Readings from Last 1 Encounters:   06/05/24 107.3 kg (236 lb 9.6 oz)     Temp Readings from Last 1 Encounters:   06/06/24 97.3 °F (36.3 °C) (Oral)     Recent Labs       0000 06/04/24  1740 06/04/24  1759 06/04/24  1804 06/04/24  1913 06/04/24  1940 06/05/24  0404 06/05/24  0938 06/05/24  2151 06/06/24  0016 06/06/24  0346 06/06/24  0631 06/06/24  1314   BUN   < >  --   --   --  57*  --  61*  --  67*  --  68*  --   --    CREATININE   < >  --    < >  --  1.80*   < > 2.04*  --  2.49*  --  2.63*  --   --    WBC  --  14.7*  --   --   --   --  12.1*  --   --   --  15.3*  --   --    PROCAL  --   --   --   --  2.60*  --   --   --   --   --   --   --  3.06*   LACTA  --   --   --   --   --    < > 4.5*   < > 4.2*   < > 3.8* 5.0* 4.7*   LACTSEPSIS  --   --   --  3.6* 3.9*  --   --   --   --   --   --   --   --     < > = values in this interval not displayed.       Lab Results   Component Value Date/Time    VANCORANDOM 11.1 06/06/2024 01:14 PM       MRSA Nasal Swab: N/A. Non-respiratory infection    Assessment:  Date:  Dose/Freq Admin Times Level/Time:   6/4 2500 mg x 1 2023 6/5 6/6 1500 mg x 1  (1700) Rd @ 1314 = 11.1   6/7   Rd @ (0400)           Plan:  Concentration-guided dosing due to renal impairment  Random level is sub-therapeutic   Give vancomycin 1500 mg IV x 1  Repeat vancomycin concentration ordered for 6/7 @ 0400    Pharmacy will continue to monitor patient and adjust therapy as indicated    Thank you for the consult,  Suzan Castaneda [facility]

## 2024-06-06 NOTE — CARE COORDINATION
Chart reviewed s/p tx to ICU from tele for poss pressors. Screen completed prior and CM following for NELLIE needs/POC.

## 2024-06-06 NOTE — PROGRESS NOTES
TRANSFER - IN REPORT:    Verbal report received from DERIK Mosley on Chris Anderson  being received from [facility] for routine progression of patient care      Report consisted of patient's Situation, Background, Assessment and   Recommendations(SBAR).     Information from the following report(s) Nurse Handoff Report, Intake/Output, MAR, Recent Results, and Cardiac Rhythm V Paced  was reviewed with the receiving nurse.    Opportunity for questions and clarification was provided.      Assessment will be completed upon patient's arrival to unit and care assumed.

## 2024-06-06 NOTE — ICUWATCH
RRT Clinical Rounding Nurse Progress Report      SUBJECTIVE: Patient assessed secondary to RN/provider concern - critical labs.      Vitals:    06/05/24 1558 06/05/24 1940 06/06/24 0014 06/06/24 0418   BP: 100/61 (!) 108/58 107/65 106/63   Pulse: 69 70 86 79   Resp: 17 16 16 16   Temp: 98.1 °F (36.7 °C) 97.1 °F (36.2 °C) 97.3 °F (36.3 °C) 97.5 °F (36.4 °C)   TempSrc: Axillary Oral Oral Oral   SpO2: 95% 95% 94% 99%   Weight:       Height:            DETERIORATION INDEX SCORE: 35    ASSESSMENT:  Pt in bed resting quietly, A&Ox4, no needs expressed at this time. Respirations even and unlabored, bilaterally clear, no needs expressed. Pt discussed with primary RN- primary RN to obtain RVP, urine labs when able.     PLAN:  Will follow per RRT Clinical Rounding Program protocol.    Sarah Hall RN  [facility]: [phone]  [facility]: [phone]

## 2024-06-06 NOTE — PROGRESS NOTES
[facility] Critical Care Note:: 6/6/2024  Chris Anderson  Admission Date: 6/4/2024     Length of Stay: 2 days    Background: 73 y.o. male with PMH of DM2, ESRD s/p renal transplant 2016, immunosuppression (on tacrolimus, prednisone, and cellcept), a-fib s/p watchman, CAD s/p CABG (complicated post-op course with prolonged mechanical ventilation and trach), HLD, HTN, CORAL, GERD, COPD, former smoker 30 pack year history quit in 2014.      Patient was just admitted at [facility], discharged on 6/1, with DKA. He presented to his nephrology o/p appointment on 6/4 with increased SOB, pitting edema, and overall ill appearing per provider. He was sent to the ER for evaluation. He was found to have hyperkalema and early kidney injury. He received duoneb, calcium, dextrose, regular insulin, lokelma, and bicarb IVF. After treatment, K decreased only slightly from 6.1 to 6.0. Lactic acid on arrival was elevated at 3.9, procal elevated at 2.6, WBC mildly elevated at 14.7. UA was positive for small leukocytes. Blood cultures were drawn in ER. Bicarb levels 13. He was initially prepped to be admitted to ICU but was downgraded to non-ICU bed after he improved in the ER. .    Notable PMH:  has a past medical history of Anemia, Arrhythmia, Brain bleed ([facility]), Brain bleed ([facility]), Bronchial pneumonia, CAD (coronary artery disease), Carotid artery stenosis, Chronic kidney disease, Chronic obstructive pulmonary disease (HCC), Chronic pain, GERD (gastroesophageal reflux disease), History of echocardiogram, Hypertension, NSVT (nonsustained ventricular tachycardia) ([facility]), PAD (peripheral artery disease) ([facility]), Renal transplant recipient, Respiratory failure ([facility]), Respiratory failure, post-operative ([facility]), Seizures ([facility]), Sleep apnea, Thyroid disease, and Type 2 diabetes mellitus ([facility]).    24 Hour events:   Asked to sign back on to patient after signing off yesterday. Moved to ICU due to worsening hyponatremia and rising

## 2024-06-06 NOTE — PROGRESS NOTES
MD made aware of most recent critical labs Sodium 118, Lactic acid 3.8, potassium down to 5.7. No interventions or further orders.

## 2024-06-06 NOTE — PROGRESS NOTES
Lactic acid -not much improved with IV fluid bolus   Also not much improvement in renal output with IV fluids   Potassium has improved to 5.2  Continue Lokelma   Ultrasound findings noted.  Will consult general surgery given the concern for acute cholecystitis on the ultrasound of the right upper quadrant.  Minimal ascites  But does have liver cirrhosis  And a questionable hepatic mass- MRI recommended for further evaluation- will order without contrast-but will need to discuss with radiology if that exam will be sufficient to rule out an underlying HCC.    Further workup and management based on his clinical course.

## 2024-06-06 NOTE — PROGRESS NOTES
Occupational Therapy Note:    Occupational therapy services are being discontinued at this time d/t patient's decline in medical status and subsequent transfer to the ICU. Please re-consult therapy services with MD draperems appropriate.    Kae Tapia, OTR/L, CLT

## 2024-06-06 NOTE — ICUWATCH
RRT Clinical Rounding Nurse Update    Vitals:    06/05/24 1558 06/05/24 1940 06/06/24 0014 06/06/24 0418   BP: 100/61 (!) 108/58 107/65 106/63   Pulse: 69 70 86 79   Resp: 17 16 16 16   Temp: 98.1 °F (36.7 °C) 97.1 °F (36.2 °C) 97.3 °F (36.3 °C) 97.5 °F (36.4 °C)   TempSrc: Axillary Oral Oral Oral   SpO2: 95% 95% 94% 99%   Weight:       Height:            DETERIORATION INDEX SCORE: 37    ASSESSMENT:  Previous outreach assessment was reviewed.  Pt continues to have critical labs. Discussed pt in length with Dr. Saldivar- orders received to change Sodium Bicarb gtt to 150mEq in sterile water for 24h and add 2g Na tabs TID for 3 days. Pt to remain in current location at this time.        Latest Reference Range & Units 06/06/24 03:46   Sodium 136 - 145 mmol/L 118 (LL)   Potassium 3.5 - 5.1 mmol/L 5.7 (H)   Chloride 98 - 107 mmol/L 87 (L)   CARBON DIOXIDE 20 - 28 mmol/L 16 (L)   BUN,BUNPL 8 - 23 MG/DL 68 (H)   Creatinine 0.80 - 1.30 MG/DL 2.63 (H)   Anion Gap 9 - 18 mmol/L 14   Est, Glom Filt Rate >60 ml/min/1.73m2 25 (L)   Lactic Acid 0.5 - 2.0 mmol/L 3.8 (HH)   Glucose 70 - 99 mg/dL 373 (H)   Calcium 8.8 - 10.2 MG/DL 7.9 (L)       PLAN:  Will follow per RRT Clinical Rounding Program protocol.    Sarah Hall RN  [place]: [phone]  [place]: [phone]

## 2024-06-06 NOTE — PROGRESS NOTES
TRANSFER - OUT REPORT:    Verbal report given to DERIK Graham on Chris Anderson  being transferred to [facility] for routine progression of patient care       Report consisted of patient’s Situation, Background, Assessment and Recommendations(SBAR).     Information from the following report(s) SBAR, Kardex, Intake/Output, MAR, Recent Results, and Med Rec Status was reviewed with the receiving nurse.    Opportunity for questions and clarification was provided.

## 2024-06-06 NOTE — PROGRESS NOTES
completed initial visit with patient and S/O.  Patient expressed some anxieties as he awaits test results.  Patient and S/O engaged in life review.   provided pastoral presence, prayer and empathetic listening.  Peace be with you,  Signed by  ROSIE CochranDiv.   [phone]

## 2024-06-06 NOTE — PROGRESS NOTES
Subjective:   Daily Progress Note: 6/6/2024 11:39 AM    Events reviewed. Seen on ICU.    Comfortable  No CP No SOB  No Abd pain  MS -      Current Facility-Administered Medications   Medication Dose Route Frequency    sodium bicarbonate 150 mEq in sterile water 1,000 mL infusion   IntraVENous Continuous    sodium chloride tablet 2 g  2 g Oral TID WC    piperacillin-tazobactam (ZOSYN) 3,375 mg in sodium chloride 0.9 % 50 mL IVPB (mini-bag)  3,375 mg IntraVENous Q8H    vancomycin (VANCOCIN) intermittent dosing (placeholder)   Other RX Placeholder    hydrocortisone sodium succinate PF (SOLU-CORTEF) injection 50 mg  50 mg IntraVENous Q8H    sodium chloride 0.9 % bolus 1,000 mL  1,000 mL IntraVENous Once    pregabalin (LYRICA) capsule 25 mg  25 mg Oral BID    traMADol (ULTRAM) tablet 50 mg  50 mg Oral Q6H PRN    methocarbamol (ROBAXIN) tablet 750 mg  750 mg Oral 4x Daily PRN    sodium zirconium cyclosilicate (LOKELMA) oral suspension 10 g  10 g Oral TID    diatrizoate meglumine-sodium (GASTROGRAFIN) 66-10 % solution 15 mL  15 mL Oral ONCE PRN    albuterol (PROVENTIL) (2.5 MG/3ML) 0.083% nebulizer solution 2.5 mg  2.5 mg Nebulization Q4H PRN    albuterol (PROVENTIL) (2.5 MG/3ML) 0.083% nebulizer solution 2.5 mg  2.5 mg Nebulization BID RT    aspirin chewable tablet 81 mg  81 mg Oral Daily    atorvastatin (LIPITOR) tablet 40 mg  40 mg Oral Nightly    budesonide-formoterol (SYMBICORT) 160-4.5 MCG/ACT inhaler 2 puff  2 puff Inhalation BID RT    clopidogrel (PLAVIX) tablet 75 mg  75 mg Oral Nightly    folic acid (FOLVITE) tablet 1,000 mcg  1,000 mcg Oral Daily    levothyroxine (SYNTHROID) tablet 100 mcg  100 mcg Oral QAM AC    [Held by provider] mycophenolate (CELLCEPT) capsule 500 mg  500 mg Oral BID    pantoprazole (PROTONIX) tablet 40 mg  40 mg Oral QAM AC    tacrolimus (PROGRAF) capsule 1 mg  1 mg Oral Q12H    sodium chloride flush 0.9 % injection 5-40 mL  5-40 mL IntraVENous 2 times per day    sodium chloride flush 0.9 %  Culture if Indicated CULTURE NOT INDICATED BY UA RESULT      Epithelial Cells, UA 0-3 0 /hpf    Casts HYALINE 0 /lpf    Crystals 0 0 /LPF    Mucus, UA 0 0 /lpf   Lactate, Sepsis    Collection Time: 06/04/24  6:04 PM   Result Value Ref Range    Lactic Acid, Sepsis 3.6 (HH) 0.5 - 2.0 MMOL/L   Culture, Urine    Collection Time: 06/04/24  6:04 PM    Specimen: Urine, clean catch   Result Value Ref Range    Special Requests NO SPECIAL REQUESTS      Culture NO GROWTH 1 DAY     POCT Glucose    Collection Time: 06/04/24  6:27 PM   Result Value Ref Range    POC Glucose 93 65 - 100 mg/dL    Performed by: Sara    POCT Glucose    Collection Time: 06/04/24  7:01 PM   Result Value Ref Range    POC Glucose 152 (H) 65 - 100 mg/dL    Performed by: Sara    Lactate, Sepsis    Collection Time: 06/04/24  7:13 PM   Result Value Ref Range    Lactic Acid, Sepsis 3.9 (HH) 0.5 - 2.0 MMOL/L   Magnesium    Collection Time: 06/04/24  7:13 PM   Result Value Ref Range    Magnesium 1.5 (L) 1.8 - 2.4 mg/dL   Comprehensive Metabolic Panel    Collection Time: 06/04/24  7:13 PM   Result Value Ref Range    Sodium 121 (L) 136 - 145 mmol/L    Potassium 6.1 (HH) 3.5 - 5.1 mmol/L    Chloride 94 (L) 98 - 107 mmol/L    CO2 14 (LL) 20 - 28 mmol/L    Anion Gap 12 9 - 18 mmol/L    Glucose 168 (H) 70 - 99 mg/dL    BUN 57 (H) 8 - 23 MG/DL    Creatinine 1.80 (H) 0.80 - 1.30 MG/DL    Est, Glom Filt Rate 40 (L) >60 ml/min/1.73m2    Calcium 8.9 8.8 - 10.2 MG/DL    Total Bilirubin 2.0 (H) 0.0 - 1.2 MG/DL    ALT 75 (H) 12 - 65 U/L     (H) 15 - 37 U/L    Alk Phosphatase 870 (H) 40 - 129 U/L    Total Protein 4.1 (L) 6.3 - 8.2 g/dL    Albumin 2.2 (L) 3.2 - 4.6 g/dL    Globulin 1.9 (L) 2.3 - 3.5 g/dL    Albumin/Globulin Ratio 1.1 1.0 - 1.9     Brain Natriuretic Peptide    Collection Time: 06/04/24  7:13 PM   Result Value Ref Range    NT Pro-BNP 6,384 (H) 0 - 125 PG/ML   Procalcitonin    Collection Time: 06/04/24  7:13 PM   Result Value Ref

## 2024-06-07 ENCOUNTER — APPOINTMENT (OUTPATIENT)
Dept: GENERAL RADIOLOGY | Age: 74
DRG: 872 | End: 2024-06-07
Payer: MEDICARE

## 2024-06-07 PROBLEM — R16.0 LIVER MASS: Status: ACTIVE | Noted: 2024-06-07

## 2024-06-07 PROBLEM — K74.60 CIRRHOSIS OF LIVER WITH ASCITES (HCC): Status: ACTIVE | Noted: 2024-06-07

## 2024-06-07 PROBLEM — R18.8 CIRRHOSIS OF LIVER WITH ASCITES (HCC): Status: ACTIVE | Noted: 2024-06-07

## 2024-06-07 PROBLEM — K80.20 CALCULUS OF GALLBLADDER WITHOUT CHOLECYSTITIS WITHOUT OBSTRUCTION: Status: ACTIVE | Noted: 2024-06-07

## 2024-06-07 PROBLEM — R79.89 ABNORMAL LFTS: Status: ACTIVE | Noted: 2024-06-07

## 2024-06-07 LAB
ALBUMIN SERPL-MCNC: 2.1 G/DL (ref 3.2–4.6)
ALBUMIN/GLOB SERPL: 1.2 (ref 1–1.9)
ALP SERPL-CCNC: 783 U/L (ref 40–129)
ALT SERPL-CCNC: 85 U/L (ref 12–65)
ANION GAP SERPL CALC-SCNC: 16 MMOL/L (ref 9–18)
ANION GAP SERPL CALC-SCNC: 17 MMOL/L (ref 9–18)
ANION GAP SERPL CALC-SCNC: 18 MMOL/L (ref 9–18)
ANION GAP SERPL CALC-SCNC: 18 MMOL/L (ref 9–18)
AST SERPL-CCNC: 123 U/L (ref 15–37)
BACTERIA SPEC CULT: NORMAL
BILIRUB DIRECT SERPL-MCNC: 2.8 MG/DL (ref 0–0.4)
BILIRUB SERPL-MCNC: 3.1 MG/DL (ref 0–1.2)
BUN SERPL-MCNC: 75 MG/DL (ref 8–23)
BUN SERPL-MCNC: 76 MG/DL (ref 8–23)
BUN SERPL-MCNC: 81 MG/DL (ref 8–23)
BUN SERPL-MCNC: 85 MG/DL (ref 8–23)
CALCIUM SERPL-MCNC: 7.1 MG/DL (ref 8.8–10.2)
CALCIUM SERPL-MCNC: 7.2 MG/DL (ref 8.8–10.2)
CALCIUM SERPL-MCNC: 7.2 MG/DL (ref 8.8–10.2)
CALCIUM SERPL-MCNC: 7.4 MG/DL (ref 8.8–10.2)
CEA SERPL-MCNC: 1.9 NG/ML (ref 0–3.8)
CHLORIDE SERPL-SCNC: 86 MMOL/L (ref 98–107)
CHLORIDE SERPL-SCNC: 87 MMOL/L (ref 98–107)
CHLORIDE SERPL-SCNC: 88 MMOL/L (ref 98–107)
CHLORIDE SERPL-SCNC: 89 MMOL/L (ref 98–107)
CO2 SERPL-SCNC: 18 MMOL/L (ref 20–28)
CREAT SERPL-MCNC: 2.92 MG/DL (ref 0.8–1.3)
CREAT SERPL-MCNC: 3.01 MG/DL (ref 0.8–1.3)
CREAT SERPL-MCNC: 3.4 MG/DL (ref 0.8–1.3)
CREAT SERPL-MCNC: 3.51 MG/DL (ref 0.8–1.3)
ERYTHROCYTE [DISTWIDTH] IN BLOOD BY AUTOMATED COUNT: 17.8 % (ref 11.9–14.6)
FERRITIN SERPL-MCNC: 225 NG/ML (ref 8–388)
FERRITIN SERPL-MCNC: 252 NG/ML (ref 8–388)
GLOBULIN SER CALC-MCNC: 1.8 G/DL (ref 2.3–3.5)
GLUCOSE BLD STRIP.AUTO-MCNC: 264 MG/DL (ref 65–100)
GLUCOSE BLD STRIP.AUTO-MCNC: 279 MG/DL (ref 65–100)
GLUCOSE BLD STRIP.AUTO-MCNC: 285 MG/DL (ref 65–100)
GLUCOSE BLD STRIP.AUTO-MCNC: 299 MG/DL (ref 65–100)
GLUCOSE SERPL-MCNC: 275 MG/DL (ref 70–99)
GLUCOSE SERPL-MCNC: 303 MG/DL (ref 70–99)
GLUCOSE SERPL-MCNC: 306 MG/DL (ref 70–99)
GLUCOSE SERPL-MCNC: 314 MG/DL (ref 70–99)
HCT VFR BLD AUTO: 35.3 % (ref 41.1–50.3)
HGB BLD-MCNC: 11.4 G/DL (ref 13.6–17.2)
INR PPP: 1.7
IRON SATN MFR SERPL: 7 % (ref 20–50)
IRON SERPL-MCNC: 14 UG/DL (ref 35–100)
MAGNESIUM SERPL-MCNC: 1.8 MG/DL (ref 1.8–2.4)
MCH RBC QN AUTO: 22.4 PG (ref 26.1–32.9)
MCHC RBC AUTO-ENTMCNC: 32.3 G/DL (ref 31.4–35)
MCV RBC AUTO: 69.5 FL (ref 82–102)
MRSA DNA SPEC QL NAA+PROBE: NOT DETECTED
NRBC # BLD: 0 K/UL (ref 0–0.2)
PLATELET # BLD AUTO: 111 K/UL (ref 150–450)
PMV BLD AUTO: 10.3 FL (ref 9.4–12.3)
POTASSIUM SERPL-SCNC: 4.3 MMOL/L (ref 3.5–5.1)
POTASSIUM SERPL-SCNC: 4.4 MMOL/L (ref 3.5–5.1)
POTASSIUM SERPL-SCNC: 4.5 MMOL/L (ref 3.5–5.1)
POTASSIUM SERPL-SCNC: 4.7 MMOL/L (ref 3.5–5.1)
PROT SERPL-MCNC: 3.9 G/DL (ref 6.3–8.2)
PROTHROMBIN TIME: 20.9 SEC (ref 11.3–14.9)
RBC # BLD AUTO: 5.08 M/UL (ref 4.23–5.6)
S AUREUS CPE NOSE QL NAA+PROBE: NOT DETECTED
SERVICE CMNT-IMP: ABNORMAL
SERVICE CMNT-IMP: NORMAL
SODIUM SERPL-SCNC: 121 MMOL/L (ref 136–145)
SODIUM SERPL-SCNC: 121 MMOL/L (ref 136–145)
SODIUM SERPL-SCNC: 123 MMOL/L (ref 136–145)
SODIUM SERPL-SCNC: 124 MMOL/L (ref 136–145)
TIBC SERPL-MCNC: 185 UG/DL (ref 240–450)
UIBC SERPL-MCNC: 171 UG/DL (ref 112–347)
VANCOMYCIN SERPL-MCNC: 19.4 UG/ML
WBC # BLD AUTO: 20.1 K/UL (ref 4.3–11.1)

## 2024-06-07 PROCEDURE — 6370000000 HC RX 637 (ALT 250 FOR IP): Performed by: INTERNAL MEDICINE

## 2024-06-07 PROCEDURE — 6370000000 HC RX 637 (ALT 250 FOR IP): Performed by: FAMILY MEDICINE

## 2024-06-07 PROCEDURE — 94640 AIRWAY INHALATION TREATMENT: CPT

## 2024-06-07 PROCEDURE — 86381 MITOCHONDRIAL ANTIBODY EACH: CPT

## 2024-06-07 PROCEDURE — 83550 IRON BINDING TEST: CPT

## 2024-06-07 PROCEDURE — 6360000002 HC RX W HCPCS: Performed by: INTERNAL MEDICINE

## 2024-06-07 PROCEDURE — 2000000000 HC ICU R&B

## 2024-06-07 PROCEDURE — 80076 HEPATIC FUNCTION PANEL: CPT

## 2024-06-07 PROCEDURE — 93005 ELECTROCARDIOGRAM TRACING: CPT | Performed by: NURSE PRACTITIONER

## 2024-06-07 PROCEDURE — 85027 COMPLETE CBC AUTOMATED: CPT

## 2024-06-07 PROCEDURE — 6360000002 HC RX W HCPCS: Performed by: FAMILY MEDICINE

## 2024-06-07 PROCEDURE — 97165 OT EVAL LOW COMPLEX 30 MIN: CPT

## 2024-06-07 PROCEDURE — 83540 ASSAY OF IRON: CPT

## 2024-06-07 PROCEDURE — 2700000000 HC OXYGEN THERAPY PER DAY

## 2024-06-07 PROCEDURE — 80048 BASIC METABOLIC PNL TOTAL CA: CPT

## 2024-06-07 PROCEDURE — 2580000003 HC RX 258: Performed by: INTERNAL MEDICINE

## 2024-06-07 PROCEDURE — 97535 SELF CARE MNGMENT TRAINING: CPT

## 2024-06-07 PROCEDURE — 36415 COLL VENOUS BLD VENIPUNCTURE: CPT

## 2024-06-07 PROCEDURE — 82728 ASSAY OF FERRITIN: CPT

## 2024-06-07 PROCEDURE — 6370000000 HC RX 637 (ALT 250 FOR IP): Performed by: HOSPITALIST

## 2024-06-07 PROCEDURE — 99291 CRITICAL CARE FIRST HOUR: CPT | Performed by: INTERNAL MEDICINE

## 2024-06-07 PROCEDURE — 97164 PT RE-EVAL EST PLAN CARE: CPT

## 2024-06-07 PROCEDURE — 86015 ACTIN ANTIBODY EACH: CPT

## 2024-06-07 PROCEDURE — 86038 ANTINUCLEAR ANTIBODIES: CPT

## 2024-06-07 PROCEDURE — 71045 X-RAY EXAM CHEST 1 VIEW: CPT

## 2024-06-07 PROCEDURE — 82962 GLUCOSE BLOOD TEST: CPT

## 2024-06-07 PROCEDURE — 83735 ASSAY OF MAGNESIUM: CPT

## 2024-06-07 PROCEDURE — 99223 1ST HOSP IP/OBS HIGH 75: CPT | Performed by: PHYSICIAN ASSISTANT

## 2024-06-07 PROCEDURE — 82105 ALPHA-FETOPROTEIN SERUM: CPT

## 2024-06-07 PROCEDURE — 73562 X-RAY EXAM OF KNEE 3: CPT

## 2024-06-07 PROCEDURE — 97530 THERAPEUTIC ACTIVITIES: CPT

## 2024-06-07 PROCEDURE — 80202 ASSAY OF VANCOMYCIN: CPT

## 2024-06-07 PROCEDURE — 82378 CARCINOEMBRYONIC ANTIGEN: CPT

## 2024-06-07 PROCEDURE — 85610 PROTHROMBIN TIME: CPT

## 2024-06-07 PROCEDURE — 86301 IMMUNOASSAY TUMOR CA 19-9: CPT

## 2024-06-07 RX ORDER — BUMETANIDE 0.25 MG/ML
2 INJECTION INTRAMUSCULAR; INTRAVENOUS 2 TIMES DAILY
Status: DISCONTINUED | OUTPATIENT
Start: 2024-06-07 | End: 2024-06-07

## 2024-06-07 RX ADMIN — TACROLIMUS 1 MG: 1 CAPSULE ORAL at 08:54

## 2024-06-07 RX ADMIN — Medication 2 G: at 16:47

## 2024-06-07 RX ADMIN — BUDESONIDE AND FORMOTEROL FUMARATE DIHYDRATE 2 PUFF: 160; 4.5 AEROSOL RESPIRATORY (INHALATION) at 07:26

## 2024-06-07 RX ADMIN — SODIUM CHLORIDE, PRESERVATIVE FREE 10 ML: 5 INJECTION INTRAVENOUS at 08:56

## 2024-06-07 RX ADMIN — PANTOPRAZOLE SODIUM 40 MG: 40 TABLET, DELAYED RELEASE ORAL at 07:48

## 2024-06-07 RX ADMIN — HYDROCORTISONE SODIUM SUCCINATE 50 MG: 100 INJECTION, POWDER, FOR SOLUTION INTRAMUSCULAR; INTRAVENOUS at 03:12

## 2024-06-07 RX ADMIN — METHOCARBAMOL TABLETS 750 MG: 750 TABLET, COATED ORAL at 20:44

## 2024-06-07 RX ADMIN — ASPIRIN 81 MG 81 MG: 81 TABLET ORAL at 08:54

## 2024-06-07 RX ADMIN — SODIUM ZIRCONIUM CYCLOSILICATE 10 G: 10 POWDER, FOR SUSPENSION ORAL at 14:11

## 2024-06-07 RX ADMIN — ATORVASTATIN CALCIUM 40 MG: 40 TABLET, FILM COATED ORAL at 20:47

## 2024-06-07 RX ADMIN — CLOPIDOGREL BISULFATE 75 MG: 75 TABLET ORAL at 20:47

## 2024-06-07 RX ADMIN — ALBUTEROL SULFATE 2.5 MG: 2.5 SOLUTION RESPIRATORY (INHALATION) at 19:25

## 2024-06-07 RX ADMIN — PIPERACILLIN AND TAZOBACTAM 3375 MG: 3; .375 INJECTION, POWDER, LYOPHILIZED, FOR SOLUTION INTRAVENOUS at 14:12

## 2024-06-07 RX ADMIN — TACROLIMUS 1 MG: 1 CAPSULE ORAL at 20:47

## 2024-06-07 RX ADMIN — VANCOMYCIN HYDROCHLORIDE 1000 MG: 1 INJECTION, POWDER, LYOPHILIZED, FOR SOLUTION INTRAVENOUS at 11:11

## 2024-06-07 RX ADMIN — FOLIC ACID 1000 MCG: 1 TABLET ORAL at 08:54

## 2024-06-07 RX ADMIN — Medication 2 G: at 12:31

## 2024-06-07 RX ADMIN — BUMETANIDE 0.5 MG/HR: 0.25 INJECTION, SOLUTION INTRAMUSCULAR; INTRAVENOUS at 13:10

## 2024-06-07 RX ADMIN — SODIUM ZIRCONIUM CYCLOSILICATE 10 G: 10 POWDER, FOR SUSPENSION ORAL at 20:52

## 2024-06-07 RX ADMIN — INSULIN LISPRO 2 UNITS: 100 INJECTION, SOLUTION INTRAVENOUS; SUBCUTANEOUS at 08:54

## 2024-06-07 RX ADMIN — INSULIN LISPRO 2 UNITS: 100 INJECTION, SOLUTION INTRAVENOUS; SUBCUTANEOUS at 12:31

## 2024-06-07 RX ADMIN — BUMETANIDE 2 MG: 0.25 INJECTION INTRAMUSCULAR; INTRAVENOUS at 11:00

## 2024-06-07 RX ADMIN — PREGABALIN 25 MG: 25 CAPSULE ORAL at 20:47

## 2024-06-07 RX ADMIN — LEVOTHYROXINE SODIUM 100 MCG: 0.1 TABLET ORAL at 07:48

## 2024-06-07 RX ADMIN — ALBUTEROL SULFATE 2.5 MG: 2.5 SOLUTION RESPIRATORY (INHALATION) at 07:26

## 2024-06-07 RX ADMIN — Medication 2 G: at 08:54

## 2024-06-07 RX ADMIN — INSULIN LISPRO 2 UNITS: 100 INJECTION, SOLUTION INTRAVENOUS; SUBCUTANEOUS at 16:47

## 2024-06-07 RX ADMIN — TRAMADOL HYDROCHLORIDE 50 MG: 50 TABLET ORAL at 20:47

## 2024-06-07 RX ADMIN — ONDANSETRON 4 MG: 2 INJECTION INTRAMUSCULAR; INTRAVENOUS at 13:06

## 2024-06-07 RX ADMIN — PIPERACILLIN AND TAZOBACTAM 3375 MG: 3; .375 INJECTION, POWDER, LYOPHILIZED, FOR SOLUTION INTRAVENOUS at 06:09

## 2024-06-07 RX ADMIN — BUDESONIDE AND FORMOTEROL FUMARATE DIHYDRATE 2 PUFF: 160; 4.5 AEROSOL RESPIRATORY (INHALATION) at 19:24

## 2024-06-07 RX ADMIN — PIPERACILLIN AND TAZOBACTAM 3375 MG: 3; .375 INJECTION, POWDER, LYOPHILIZED, FOR SOLUTION INTRAVENOUS at 20:59

## 2024-06-07 RX ADMIN — SODIUM ZIRCONIUM CYCLOSILICATE 10 G: 10 POWDER, FOR SUSPENSION ORAL at 08:54

## 2024-06-07 RX ADMIN — PREGABALIN 25 MG: 25 CAPSULE ORAL at 08:54

## 2024-06-07 ASSESSMENT — PAIN DESCRIPTION - ORIENTATION
ORIENTATION: RIGHT;LEFT
ORIENTATION: RIGHT;LEFT;ANTERIOR
ORIENTATION: RIGHT;ANTERIOR

## 2024-06-07 ASSESSMENT — PAIN SCALES - GENERAL
PAINLEVEL_OUTOF10: 0
PAINLEVEL_OUTOF10: 6
PAINLEVEL_OUTOF10: 8
PAINLEVEL_OUTOF10: 0
PAINLEVEL_OUTOF10: 8
PAINLEVEL_OUTOF10: 0
PAINLEVEL_OUTOF10: 6

## 2024-06-07 ASSESSMENT — PAIN DESCRIPTION - LOCATION
LOCATION: LEG
LOCATION: BACK;ABDOMEN
LOCATION: LEG
LOCATION: ABDOMEN

## 2024-06-07 ASSESSMENT — PAIN - FUNCTIONAL ASSESSMENT
PAIN_FUNCTIONAL_ASSESSMENT: ACTIVITIES ARE NOT PREVENTED
PAIN_FUNCTIONAL_ASSESSMENT: ACTIVITIES ARE NOT PREVENTED

## 2024-06-07 ASSESSMENT — PAIN DESCRIPTION - DESCRIPTORS
DESCRIPTORS: ACHING;SORE;SPASM
DESCRIPTORS: SPASM
DESCRIPTORS: ACHING;SORE

## 2024-06-07 NOTE — PROGRESS NOTES
Physical Therapy Note:    Therapist is discontinuing acute PT services secondary to transfer to the ICU. Please re-consult acute PT services when medically appropriate. Thank you,    Celine Miller, PT, DPT

## 2024-06-07 NOTE — INTERDISCIPLINARY ROUNDS
Multi-D Rounds/Checklist (leapfrog):  Lines: can any be removed?: None    Urinary Catheter 06/06/24 Lyles (Active)      DVT Prophylaxis: Ordered- SCDs  Vent: N/A  Nutrition Ordered/appropriate: Ordered  Can antibiotics or other drugs be stopped? No /End Date set  Inpat Anti-Infectives (From admission, onward)       Start     Ordered Stop    06/06/24 1336  piperacillin-tazobactam (ZOSYN) 3,375 mg in sodium chloride 0.9 % 50 mL IVPB (mini-bag)  3,375 mg,   IntraVENous,   EVERY 8 HOURS        [facility].    06/06/24 0737 --    06/06/24 0821  vancomycin (VANCOCIN) intermittent dosing (placeholder)  Other,   Rx Placeholder         06/06/24 0822 06/11/24 0820                  Consults needed: None  A: Is pain control adequate? (has PRNs? Stop drip?) Yes  B: Sedation break and SBT? N/A  C: Is sedation choice appropriate? N/A  D: Delirium/CAM-ICU? No  E: Mobility goals/appropriateness? Yes  F: Family update and plan? Significant other  is surrogate decision maker and is being updated daily by primary attending and nursing staff.    CRISTINA Gamboa - NP

## 2024-06-07 NOTE — PROGRESS NOTES
Therapy Note:  Therapist participated in ICU/CCU IDT rounds and believe patient is currently functioning below baseline functional mobility/ADL performance.  Patient could benefit from skilled therapy services when MD deems medically appropriate.  Thank you,  Guadalupe Mercado, PT

## 2024-06-07 NOTE — PROGRESS NOTES
Patient found kneeling at bedside with bed alarming at 0520. Patient is alert and oriented x4. Patient states he was sitting on the side of the bed and was trying to lie down again. Dr Saldivar notified.

## 2024-06-07 NOTE — CARE COORDINATION
Chart reviewed and pt discussed in am IDR. Continues ICU  after tx for RR and poss pressors. Hx of kideny transplant and followed by Nephrology. RA and Bumex gtt ordered. No pressors. CM following for NELLIE needs/POC. LOS 3 days.

## 2024-06-07 NOTE — PROGRESS NOTES
[facility] Critical Care Note:: 6/7/2024  Chris Anderson  Admission Date: 6/4/2024     Length of Stay: 3 days    Background: 73 y.o. male with PMH of DM2, ESRD s/p renal transplant 2016, immunosuppression (on tacrolimus, prednisone, and cellcept), a-fib s/p watchman, CAD s/p CABG (complicated post-op course with prolonged mechanical ventilation and trach), HLD, HTN, CORAL, GERD, COPD, former smoker 30 pack year history quit in 2014.      Patient was just admitted at [facility], discharged on 6/1, with DKA. He presented to his nephrology o/p appointment on 6/4 with increased SOB, pitting edema, and overall ill appearing per provider. He was sent to the ER for evaluation. He was found to have hyperkalema and early kidney injury. He received duoneb, calcium, dextrose, regular insulin, lokelma, and bicarb IVF. After treatment, K decreased only slightly from 6.1 to 6.0. Lactic acid on arrival was elevated at 3.9, procal elevated at 2.6, WBC mildly elevated at 14.7. UA was positive for small leukocytes. Blood cultures were drawn in ER. Bicarb levels 13. He was initially prepped to be admitted to ICU but was downgraded to non-ICU bed after he improved in the ER. .    Notable PMH:  has a past medical history of Anemia, Arrhythmia, Brain bleed ([facility]), Brain bleed (HCC), Bronchial pneumonia, CAD (coronary artery disease), Carotid artery stenosis, Chronic kidney disease, Chronic obstructive pulmonary disease (HCC), Chronic pain, GERD (gastroesophageal reflux disease), History of echocardiogram, Hypertension, NSVT (nonsustained ventricular tachycardia) ([facility]), PAD (peripheral artery disease) ([facility]), Renal transplant recipient, Respiratory failure ([facility]), Respiratory failure, post-operative ([facility]CC), Seizures ([facility]CC), Sleep apnea, Thyroid disease, and Type 2 diabetes mellitus ([facility]).    24 Hour events:   Leukocytosis continues to rise, though s/p steroids.  Platelets are down to 111.  Bilirubin rising but transaminases

## 2024-06-07 NOTE — PROGRESS NOTES
ACUTE PHYSICAL THERAPY GOALS:   (Developed with and agreed upon by patient and/or caregiver.)  UPDATED 6/7/24  LTG:  (1.)Mr. Anderson will move from supine to sit and sit to supine , scoot up and down, and roll side to side in bed with SUPERVISION within 7 treatment day(s).    (2.)Mr. Anderson will transfer from bed to chair and chair to bed with STAND BY ASSIST using the least restrictive device within 7 treatment day(s).    (3.)Mr. Anderson will ambulate with STAND BY ASSIST for 100 feet with the least restrictive device within 7 treatment day(s).  (4.)Mr. Anderson will participate in therapeutic activity/exercises x 25 minutes for increased activity tolerance with SpO2 above 90% within 7 treatment days.  (5.)Mr. Anderson will perform standing static and dynamic balance activities x 15 minutes with STAND BY ASSIST to improve safety within 7 day(s).    ________________________________________________________________________________________________          PHYSICAL THERAPY Re-evaluation and PM  (Link to Caseload Tracking: PT Visit Days : 1  Acknowledge Orders  Time In/Out  PT Charge Capture  Rehab Caseload Tracker    FALL RISK    Chris Anderson is a 73 y.o. male   PRIMARY DIAGNOSIS: Hyperkalemia  Dehydration [E86.0]  Hyperkalemia [E87.5]  Acute kidney injury (HCC) [N17.9]  Immunosuppressed status (HCC) [D84.9]  Sepsis secondary to UTI (HCC) [A41.9, N39.0]  Elevated lactic acid level [R79.89]       Reason for Referral: Generalized Muscle Weakness (M62.81)  Difficulty in walking, Not elsewhere classified (R26.2)  Other abnormalities of gait and mobility (R26.89)  History of falling (Z91.81)  Inpatient: Payor: [employer] MEDICARE / Plan: [employer] GOLD PLUS HMO / Product Type: *No Product type* /     ASSESSMENT:     REHAB RECOMMENDATIONS:   Recommendation to date pending progress:  Setting:  Short-term Rehab    Equipment:    To Be Determined     ASSESSMENT:  Per initial evaluation: \"Mr. Anderson Is a 73 y.o. male presenting to  of Home: House  Home Equipment: None  Receives Help From: Family  ADL Assistance: Independent  Homemaking Assistance: Independent  Ambulation Assistance: Independent  Transfer Assistance: Independent  Active : Yes  Mode of Transportation: Car  Occupation: Retired    OBJECTIVE:     PAIN: VITALS / O2: PRECAUTION / LINES / DRAINS:   Pre Treatment: 8/10         Post Treatment: 4/10 Vitals        Oxygen    2 L/min   Continuous Pulse Oximetry, Lyles Catheter, IV, and Telemetry     RESTRICTIONS/PRECAUTIONS:  Restrictions/Precautions: Fall Risk                 GROSS EVALUATION:  Intact Impaired (Comments):   AROM [x]     PROM []    Strength []  General functional strength significantly diminished from baseline      Balance []  Fair+ to fair sitting and fair to fair- standing   Posture [] Forward Head  Rounded Shoulders   Sensation []  NT   Coordination []   Decreased    Tone [x]     Edema []    Activity Tolerance []  Significantly limited by general weakness and fatigue     []      COGNITION/  PERCEPTION: Intact Impaired (Comments):   Orientation [x]     Vision [x]  Not Formally Assessed but WFL   Hearing [x]  Not Formally Assessed but WFL   Cognition  [x]  Decreased safety awareness     MOBILITY: I Mod I S SBA CGA Min Mod Max Total  NT x2 Comments:   Bed Mobility    Rolling [] [] [] [] [] [] [] [] [] [] []    Supine to Sit [] [] [] [] [] [x] [] [] [] [] [x]    Scooting [] [] [] [] [] [] [] [] [] [] []     Sit to Supine [] [] [] [] [] [] [] [] [] [] []    Transfers    Sit to Stand [] [] [] [] [] [x] [] [] [] [] [x]    Bed to Chair [] [] [] [] [] [x] [x] [] [] [] [x]     Stand to Sit [] [] [] [] [] [x] [] [] [] [] [x]     [] [] [] [] [] [] [] [] [] [] []    I=Independent, Mod I=Modified Independent, S=Supervision, SBA=Standby Assistance, CGA=Contact Guard Assistance,   Min=Minimal Assistance, Mod=Moderate Assistance, Max=Maximal Assistance, Total=Total Assistance, NT=Not Tested    GAIT: I Mod I S SBA CGA Min Mod Max  GRID:  N/A    AFTER TREATMENT PRECAUTIONS: Alarm Activated, Bed/Chair Locked, Call light within reach, Chair, Needs within reach, RN notified, and Visitors at bedside    INTERDISCIPLINARY COLLABORATION:  RN/ PCT, PT/ PTA, OT/ AVILA, and OTS    EDUCATION: Education Given To: Patient  Education Provided: Role of Therapy    TIME IN/OUT:  Time In: 1253  Time Out: 1315  Minutes: 22    Guadalupe Mercado, PT

## 2024-06-07 NOTE — CONSULTS
Consult Note            Date:6/7/2024        Patient Name:Chris Anderson     YOB: 1950     Age:73 y.o.    Inpatient consult to GI  Consult performed by: Grinnell, Melissa R, PA-C  Consult ordered by: Shalonda Aguilar MD  Reason for consult: sonographic gomez's sign +acute cholecystitis, gen surg requests GI eval        Chief Complaint     Chief Complaint   Patient presents with    Shortness of Breath    Leg Swelling       History Obtained From   patient, electronic medical record    History of Present Illness   Mr. \"Isak\" Justin is a 73 year old male with PMH significant for HTN, HPL, DM, DKA, CKD, CAD, Afib s/p Watchman, SSS, COPD, CORAL, PAD, PVD, GERD, ICH, seizures, hypothyroidism, chronic pain, former tobacco abuse. Surgical hx is pertinent for CABG, RLE vascular stent, implanted pacemaker, PD catheter placement and removal, [place] [facility] [place], [place] umbilical hernia repair, tracheostomy.     Patient was admitted 6/4/24 after presenting to the ED meeting sepsis criteria with increasing peripheral edema and leg swelling with ESDRAS. Also noted to have recent admission at  for DKA.  CT chest, abdomen, pelvis 6/5 showed cholelithiasis without pericholecystic fluid, small volume ascites, bilateral pleural effusions, pleural plaque calcifications in the left lung.  Abdominal ultrasound 6/6 showed cirrhotic liver, questionable right hepatic lobe mass measuring 10 cm in greatest diameter, partial occlusion of main portal vein measuring 17 mm in diameter, gallstones, diffuse gallbladder wall thickening, no biliary ductal dilatation.  Surgery was consulted who requested GI evaluation on the basis of these findings. LFTs yesterday show total bilirubin 2.5, AST//92, alk phos 720. WBC is rising to 20.1 today. Hgb stable 11.4. Platelet count decreased 111K.    Patient reports imaging was obtained due to complains of diffuse abdominal pain that is more  RT  clopidogrel (PLAVIX) tablet 75 mg, Nightly  folic acid (FOLVITE) tablet 1,000 mcg, Daily  levothyroxine (SYNTHROID) tablet 100 mcg, QAM AC  [Held by provider] mycophenolate (CELLCEPT) capsule 500 mg, BID  pantoprazole (PROTONIX) tablet 40 mg, QAM AC  tacrolimus (PROGRAF) capsule 1 mg, Q12H  sodium chloride flush 0.9 % injection 5-40 mL, 2 times per day  sodium chloride flush 0.9 % injection 5-40 mL, PRN  0.9 % sodium chloride infusion, PRN  ondansetron (ZOFRAN-ODT) disintegrating tablet 4 mg, Q8H PRN   Or  ondansetron (ZOFRAN) injection 4 mg, Q6H PRN  polyethylene glycol (GLYCOLAX) packet 17 g, Daily PRN  acetaminophen (TYLENOL) tablet 650 mg, Q6H PRN   Or  acetaminophen (TYLENOL) suppository 650 mg, Q6H PRN  glucose chewable tablet 16 g, PRN  dextrose bolus 10% 125 mL, PRN   Or  dextrose bolus 10% 250 mL, PRN  Glucagon Emergency KIT 1 mg, PRN  dextrose 10 % infusion, Continuous PRN  insulin lispro (HUMALOG,ADMELOG) injection vial 0-4 Units, TID WC  insulin lispro (HUMALOG,ADMELOG) injection vial 0-4 Units, Nightly        Allergies   Codeine, Hydrocodone-acetaminophen, and Iodine    Social History     Social History       Tobacco History       Smoking Status  Former Quit Date  1/1/2014 Smoking Tobacco Type  Cigarettes quit in 1/1/2014   Pack Year History     Packs/Day From To Years    0 1/1/2014  10.4    1   0.0      Smokeless Tobacco Use  Never              Alcohol History       Alcohol Use Status  Yes              Drug Use       Drug Use Status  Never              Sexual Activity       Sexually Active  Not Asked                    Family History     Family History   Problem Relation Age of Onset    Stroke Mother     Cancer Father         pancreatic       Review of Systems   Review of Systems   All other systems reviewed and are negative.       Physical Exam   /62   Pulse 86   Temp 97.3 °F (36.3 °C) (Rectal)   Resp 23   Ht 1.854 m (6' 1\")   Wt 107.3 kg (236 lb 9.6 oz)   SpO2 97%   BMI 31.22 kg/m²       Physical Exam  Vitals reviewed.   Constitutional:       General: He is not in acute distress.     Appearance: He is not ill-appearing, toxic-appearing or diaphoretic.   Eyes:      General: No scleral icterus.  Cardiovascular:      Rate and Rhythm: Normal rate.   Pulmonary:      Effort: No respiratory distress.      Comments: 2 L NC, slight increased work of breathing  Abdominal:      General: There is distension (dull to percussion).      Palpations: Abdomen is soft.      Tenderness: There is abdominal tenderness (diffuse). There is no guarding or rebound.      Hernia: No hernia is present.   Skin:     General: Skin is warm and dry.      Coloration: Skin is pale.   Neurological:      General: No focal deficit present.      Mental Status: He is alert and oriented to person, place, and time.   Psychiatric:         Mood and Affect: Mood normal.         Behavior: Behavior normal.         Thought Content: Thought content normal.         Judgment: Judgment normal.         Labs    CBC:  Recent Labs     06/05/24  0404 06/06/24  0346 06/07/24  0424   WBC 12.1* 15.3* 20.1*   RBC 4.66 4.57 5.08   HGB 10.5* 10.4* 11.4*   HCT 33.0* 31.9* 35.3*   MCV 70.8* 69.8* 69.5*   RDW 17.4* 17.2* 17.8*    153 111*     CHEMISTRIES:  Recent Labs     06/04/24  1913 06/04/24  1940 06/05/24  0404 06/05/24  1416 06/06/24  1314 06/06/24  1710 06/07/24  0424   *  --  122*   < > 120* 123*  122* 121*   K 6.1*  --  6.0*   < > 5.2* 5.3*  5.2* 4.5   CL 94*  --  96*   < > 90* 91*  91* 87*   CO2 14*  --  13*   < > 12* 17*  15* 18*   BUN 57*  --  61*   < > 70* 71*  71* 75*   CREATININE 1.80*   < > 2.04*   < > 2.81* 2.84*  2.85* 2.92*   GLUCOSE 168*  --  153*   < > 349* 347*  346* 306*   MG 1.5*  --  1.5*  --   --   --   --     < > = values in this interval not displayed.     PT/INR:No results for input(s): \"PROTIME\", \"INR\" in the last 72 hours.  APTT:No results for input(s): \"APTT\" in the last 72 hours.  LIVER PROFILE:  Recent Labs

## 2024-06-07 NOTE — PROGRESS NOTES
POST FALL MANAGEMENT    Chris Anderson  MEDICAL RECORD NUMBER:  [id number]  AGE: 73 y.o.   GENDER: male  : 1950  TODAYS DATE:  2024    Details     Fall Occurred: Yes    Was the Fall Witnessed:  No      Brief Review of Event:  Patient found kneeling at bedside with bed alarming at 0520. Patient is alert and oriented x4. Patient states he was sitting on the side of the bed and was trying to lie down again. Dr Saldivar notified.                   Who found the patient: RN      Where was the patient at the time of the fall: kneeling at bedside      Patient Comments: states he was sitting on the side of the bed and trying to lay back down.       Date Fall Occurred:  2024 .       Time Fall Occurred: 5:20a.m.     Assessment     Post Fall Head to Toe Assessment Completed: Yes    Post Fall Predictive Analytic Score Reviewed: Yes     Post Fall Vitals Completed: Yes    Post Fall Neuro Checks Completed: Yes    Injury Occurred(if yes, describe injury):  no           Did the Patient Experience:(Check Ata all that apply)    [] Patient hit head  [] Loss of consciousness  [] Change in mental status following the fall  [x] Patient is on an anticoagulant medication = aspirin, plavix      CT Performed:  no    Follow-up     Persons Notified of Fall:  (Provide names of persons notified)   [x] Physician: Dr Saldivar  [] NOBLE:  [x] Nursing Supervisior: Judy Gomez  [] Manager:  [] Pharmacist:  [] Family:  [] Other:      Electronically signed by BI DAWSON RN 2024 at 6:37 AM

## 2024-06-07 NOTE — PROGRESS NOTES
Patient can not have MRI due to RA Above threshold per cardiologist. Nurse aware. Exam will be canceled.

## 2024-06-07 NOTE — PROGRESS NOTES
Nurse working on screening form. Patient has a [employer] pacemaker. Rep will have to come to set. Waiting on the Cardiologist approval form from [facility].

## 2024-06-07 NOTE — PROGRESS NOTES
[facility] DAILY FOLLOW UP RENAL INSUFFICIENCY PATIENT   [facility]   Pharmacy Pharmacokinetic Monitoring Service - Vancomycin    Consulting Provider: Dr. Aguilar   Indication: sepsis  Target Concentration: Random level <= 20 mg/L  Day of Therapy: 4 of 7  Additional Antimicrobials: piperacillin-tazobactam    Pertinent Laboratory Values:   Wt Readings from Last 1 Encounters:   06/05/24 107.3 kg (236 lb 9.6 oz)     Temp Readings from Last 1 Encounters:   06/07/24 97.3 °F (36.3 °C) (Rectal)     Recent Labs       0000 06/04/24  1804 06/04/24  1913 06/04/24  1940 06/05/24  0404 06/05/24  0938 06/06/24  0346 06/06/24  0631 06/06/24  1314 06/06/24  1710 06/07/24  0424   BUN   < >  --  57*  --  61*   < > 68*  --  70* 71*  71* 75*   CREATININE   < >  --  1.80*   < > 2.04*   < > 2.63*  --  2.81* 2.84*  2.85* 2.92*   WBC  --   --   --   --  12.1*  --  15.3*  --   --   --  20.1*   PROCAL  --   --  2.60*  --   --   --   --   --  3.06* 3.19*  --    LACTA  --   --   --    < > 4.5*   < > 3.8* 5.0* 4.7*  --   --    LACTSEPSIS  --  3.6* 3.9*  --   --   --   --   --   --   --   --     < > = values in this interval not displayed.       Lab Results   Component Value Date/Time    VANCOTROUGH 11.6 06/06/2024 05:10 PM    VANCORANDOM 19.4 06/07/2024 04:24 AM     MRSA Nasal Swab: N/A. Non-respiratory infection    Assessment:  Date:  Dose/Freq Admin Times Level/Time:   6/4 2500 mg x 1 2023 6/5      6/6 1500 mg x 1 [address] Rd @ 1314 = 11.1   6/7 1000 mg x 1 (10) Rd @ 0424 = 19.4   6/8   Rd @ (04)     Plan:  Concentration-guided dosing due to renal impairment  Random level is therapeutic   Give vancomycin 1000 mg x 1 today  Repeat vancomycin concentration ordered for 6/8 @ 0400    Pharmacy will continue to monitor patient and adjust therapy as indicated    Thank you for the consult,  Tomasa Harrington [facility]

## 2024-06-07 NOTE — PROGRESS NOTES
ACUTE OCCUPATIONAL THERAPY GOALS:   (Developed with and agreed upon by patient and/or caregiver.)  Pt will upper body dress and bathe with MOD I and adaptive equipment as needed.  Pt will lower body dress and bathe with SBA and adaptive equipment as needed.  Pt will tolerate 25 minutes of OT activity with 2-3 rest breaks in order to increase activity tolerance to perform ADLs and functional transfers.  Pt will complete functional transfer with MOD I and adaptive equipment as needed.  Pt will complete grooming activity seated EOB with MOD I and adaptive equipment as needed.  Pt will complete toilet transfer with SBA and adaptive equipment as needed.   Pt will complete toileting task with SBA and adaptive equipment as needed.   Timeframe: 7 Visits  OCCUPATIONAL THERAPY Initial Assessment, Daily Note, and PM       OT Visit Days: 1  Acknowledge Orders  Time  OT Charge Capture  Rehab Caseload Tracker      Chris Anderson is a 73 y.o. male   PRIMARY DIAGNOSIS: Hyperkalemia  Dehydration [E86.0]  Hyperkalemia [E87.5]  Acute kidney injury (HCC) [N17.9]  Immunosuppressed status (HCC) [D84.9]  Sepsis secondary to UTI (HCC) [A41.9, N39.0]  Elevated lactic acid level [R79.89]       Reason for Referral: Generalized Muscle Weakness (M62.81)  Repeated Falls (R29.6)  History of falling (Z91.81)  Inpatient: Payor: [employer] MEDICARE / Plan: [employer] GOLD PLUS HMO / Product Type: *No Product type* /     ASSESSMENT:     REHAB RECOMMENDATIONS:   Recommendation to date pending progress:  Setting:  Short-term Rehab    Equipment:    To Be Determined     ASSESSMENT:  Mr. Anderson has a history of ESRD, CAD (CABG x4 in 2015), A-fib, COPD, CORAL, and renal transplant in 2016. Presents to the ED with worsening peripheral edema and transferred to ICU for worsening hyponatremia and rising lactate levels. Of note, pt lives with significant other in 1SH 4STE with tub-shower. Has SPC to ambulate but falls frequently. IND at baseline. Alert upon

## 2024-06-07 NOTE — PROGRESS NOTES
Hospitalist Progress Note   Admit Date:  2024  5:04 PM   Name:  Chris Anderson   Age:  73 y.o.  Sex:  male  :  1950   MRN:  [id number]   Room:  [facility]/    Presenting/Chief Complaint: Shortness of Breath and Leg Swelling     Reason(s) for Admission: Dehydration [E86.0]  Hyperkalemia [E87.5]  Acute kidney injury (HCC) [N17.9]  Immunosuppressed status (HCC) [D84.9]  Sepsis secondary to UTI (HCC) [A41.9, N39.0]  Elevated lactic acid level [R79.89]     Hospital Course:   Chris Anderson is a 73 y.o. male with medical history of renal transplant 2016 [facility] on CellCept/tacrolimus/prednisone, CAD s/p CABG, A-fib s/p Watchman and pacemaker in place, COPD who presented with dyspnea from nephrology clinic.  He had been recently admitted to [facility]  -  for DKA associated with ESDRAS.  He had a follow-up with nephrology on  and was advised to come to the ED for dyspnea.  Patient admits to dysuria with diffuse abdominal pain and emesisx1 episode.  He still makes some urine.  Denies fever.    In the ED, CXR unremarkable.  Creatinine up to 1.8, bicarb 1 4, sodium 121, potassium 6.1 s/p treatment in the ED came down to 4.9.  Patient met sepsis criteria admission due to lactic acidosis 3.9 and leukocytosis 14.7 K.  UA concerning for UTI.  Patient received Vanco/cefepime in ED.    Patient was admitted with severe sepsis secondary to UTI associated with ESDRAS and CKD stage III associated with metabolic acidosis/lactic acidosis, hyperkalemia, dyspnea.  History of kidney transplant.  Nephrology and pulmonology were consulted.  Patient was started empirically on Rocephin for UTI.  Blood cultures NGTD thus far.  Prompt treatment for hyperkalemia initiated.  Patient was started on bicarb drip.    Subjective & 24hr Events:     Met with patient and partner at bedside.  He was complaining of some ongoing abdominal discomfort.  No other complaints.  Discussed plan for ongoing evaluation and treatment.    Result Value Ref Range    POC Glucose 244 (H) 65 - 100 mg/dL    Performed by: Reynaldo    POCT Glucose    Collection Time: 06/05/24  5:18 PM   Result Value Ref Range    POC Glucose 283 (H) 65 - 100 mg/dL    Performed by: Shagufta    POCT Glucose    Collection Time: 06/05/24  5:55 PM   Result Value Ref Range    POC Glucose 287 (H) 65 - 100 mg/dL    Performed by: Be    Potassium    Collection Time: 06/05/24  6:47 PM   Result Value Ref Range    Potassium 5.8 (H) 3.5 - 5.1 mmol/L   POCT Glucose    Collection Time: 06/05/24  6:51 PM   Result Value Ref Range    POC Glucose 315 (H) 65 - 100 mg/dL    Performed by: Be    POCT Glucose    Collection Time: 06/05/24  7:43 PM   Result Value Ref Range    POC Glucose 323 (H) 65 - 100 mg/dL    Performed by: GREG    Basic Metabolic Panel    Collection Time: 06/05/24  9:51 PM   Result Value Ref Range    Sodium 118 (LL) 136 - 145 mmol/L    Potassium 5.9 (H) 3.5 - 5.1 mmol/L    Chloride 90 (L) 98 - 107 mmol/L    CO2 15 (L) 20 - 28 mmol/L    Anion Gap 13 9 - 18 mmol/L    Glucose 329 (H) 70 - 99 mg/dL    BUN 67 (H) 8 - 23 MG/DL    Creatinine 2.49 (H) 0.80 - 1.30 MG/DL    Est, Glom Filt Rate 27 (L) >60 ml/min/1.73m2    Calcium 8.5 (L) 8.8 - 10.2 MG/DL   Lactic Acid    Collection Time: 06/05/24  9:51 PM   Result Value Ref Range    Lactic Acid 4.2 (HH) 0.5 - 2.0 mmol/L   Lactic Acid    Collection Time: 06/06/24 12:16 AM   Result Value Ref Range    Lactic Acid 3.7 (HH) 0.5 - 2.0 mmol/L   CBC    Collection Time: 06/06/24  3:46 AM   Result Value Ref Range    WBC 15.3 (H) 4.3 - 11.1 K/uL    RBC 4.57 4.23 - 5.6 M/uL    Hemoglobin 10.4 (L) 13.6 - 17.2 g/dL    Hematocrit 31.9 (L) 41.1 - 50.3 %    MCV 69.8 (L) 82 - 102 FL    MCH 22.8 (L) 26.1 - 32.9 PG    MCHC 32.6 31.4 - 35.0 g/dL    RDW 17.2 (H) 11.9 - 14.6 %    Platelets 153 150 - 450 K/uL    MPV 10.4 9.4 - 12.3 FL    nRBC 0.00 0.0 - 0.2 K/uL   Basic Metabolic Panel    Collection  - 14.9 sec    INR 1.7     CEA    Collection Time: 06/07/24  9:02 AM   Result Value Ref Range    CEA 1.9 0.0 - 3.8 ng/mL   Ferritin    Collection Time: 06/07/24 10:57 AM   Result Value Ref Range    Ferritin 225 8 - 388 NG/ML   POCT Glucose    Collection Time: 06/07/24 12:24 PM   Result Value Ref Range    POC Glucose 285 (H) 65 - 100 mg/dL    Performed by: Rashaad        Recent Labs     06/06/24  0537   COVID19 NOT DETECTED       Current Meds:  Current Facility-Administered Medications   Medication Dose Route Frequency    bumetanide (BUMEX) 12.5 mg in 50 mL infusion  0.5 mg/hr IntraVENous Continuous    sodium chloride tablet 2 g  2 g Oral TID WC    piperacillin-tazobactam (ZOSYN) 3,375 mg in sodium chloride 0.9 % 50 mL IVPB (mini-bag)  3,375 mg IntraVENous Q8H    vancomycin (VANCOCIN) intermittent dosing (placeholder)   Other RX Placeholder    pregabalin (LYRICA) capsule 25 mg  25 mg Oral BID    traMADol (ULTRAM) tablet 50 mg  50 mg Oral Q6H PRN    methocarbamol (ROBAXIN) tablet 750 mg  750 mg Oral 4x Daily PRN    sodium zirconium cyclosilicate (LOKELMA) oral suspension 10 g  10 g Oral TID    diatrizoate meglumine-sodium (GASTROGRAFIN) 66-10 % solution 15 mL  15 mL Oral ONCE PRN    albuterol (PROVENTIL) (2.5 MG/3ML) 0.083% nebulizer solution 2.5 mg  2.5 mg Nebulization Q4H PRN    albuterol (PROVENTIL) (2.5 MG/3ML) 0.083% nebulizer solution 2.5 mg  2.5 mg Nebulization BID RT    aspirin chewable tablet 81 mg  81 mg Oral Daily    atorvastatin (LIPITOR) tablet 40 mg  40 mg Oral Nightly    budesonide-formoterol (SYMBICORT) 160-4.5 MCG/ACT inhaler 2 puff  2 puff Inhalation BID RT    clopidogrel (PLAVIX) tablet 75 mg  75 mg Oral Nightly    folic acid (FOLVITE) tablet 1,000 mcg  1,000 mcg Oral Daily    levothyroxine (SYNTHROID) tablet 100 mcg  100 mcg Oral QAM AC    [Held by provider] mycophenolate (CELLCEPT) capsule 500 mg  500 mg Oral BID    pantoprazole (PROTONIX) tablet 40 mg  40 mg Oral QAM AC

## 2024-06-07 NOTE — PROGRESS NOTES
Subjective:   Daily Progress Note: 6/7/2024 9:21 AM    Seen on ICU. Awake and alert, uncomfortable    Dyspneic  No CP + SOB  No Abd pain  MS -      Current Facility-Administered Medications   Medication Dose Route Frequency    vancomycin (VANCOCIN) 1,000 mg in sodium chloride 0.9 % 250 mL IVPB ([id number])  1,000 mg IntraVENous Once    bumetanide (BUMEX) injection 2 mg  2 mg IntraVENous BID    sodium chloride tablet 2 g  2 g Oral TID WC    piperacillin-tazobactam (ZOSYN) 3,375 mg in sodium chloride 0.9 % 50 mL IVPB (mini-bag)  3,375 mg IntraVENous Q8H    vancomycin (VANCOCIN) intermittent dosing (placeholder)   Other RX Placeholder    pregabalin (LYRICA) capsule 25 mg  25 mg Oral BID    traMADol (ULTRAM) tablet 50 mg  50 mg Oral Q6H PRN    methocarbamol (ROBAXIN) tablet 750 mg  750 mg Oral 4x Daily PRN    sodium zirconium cyclosilicate (LOKELMA) oral suspension 10 g  10 g Oral TID    diatrizoate meglumine-sodium (GASTROGRAFIN) 66-10 % solution 15 mL  15 mL Oral ONCE PRN    albuterol (PROVENTIL) (2.5 MG/3ML) 0.083% nebulizer solution 2.5 mg  2.5 mg Nebulization Q4H PRN    albuterol (PROVENTIL) (2.5 MG/3ML) 0.083% nebulizer solution 2.5 mg  2.5 mg Nebulization BID RT    aspirin chewable tablet 81 mg  81 mg Oral Daily    atorvastatin (LIPITOR) tablet 40 mg  40 mg Oral Nightly    budesonide-formoterol (SYMBICORT) 160-4.5 MCG/ACT inhaler 2 puff  2 puff Inhalation BID RT    clopidogrel (PLAVIX) tablet 75 mg  75 mg Oral Nightly    folic acid (FOLVITE) tablet 1,000 mcg  1,000 mcg Oral Daily    levothyroxine (SYNTHROID) tablet 100 mcg  100 mcg Oral QAM AC    [Held by provider] mycophenolate (CELLCEPT) capsule 500 mg  500 mg Oral BID    pantoprazole (PROTONIX) tablet 40 mg  40 mg Oral QAM AC    tacrolimus (PROGRAF) capsule 1 mg  1 mg Oral Q12H    sodium chloride flush 0.9 % injection 5-40 mL  5-40 mL IntraVENous 2 times per day    sodium chloride flush 0.9 % injection 5-40 mL  5-40 mL IntraVENous PRN    0.9 % sodium chloride  Ratio 1.2 1.0 - 1.9     Procalcitonin    Collection Time: 06/06/24  5:10 PM   Result Value Ref Range    Procalcitonin 3.19 (H) 0.00 - 0.10 ng/mL   Vancomycin Level, Trough    Collection Time: 06/06/24  5:10 PM   Result Value Ref Range    Vancomycin Tr 11.6 10.0 - 20.0 ug/mL   POCT Glucose    Collection Time: 06/06/24  6:41 PM   Result Value Ref Range    POC Glucose 310 (H) 65 - 100 mg/dL    Performed by: Diandra    POCT Glucose    Collection Time: 06/06/24  9:37 PM   Result Value Ref Range    POC Glucose 314 (H) 65 - 100 mg/dL    Performed by: Umesh    Basic Metabolic Panel    Collection Time: 06/07/24  4:24 AM   Result Value Ref Range    Sodium 121 (L) 136 - 145 mmol/L    Potassium 4.5 3.5 - 5.1 mmol/L    Chloride 87 (L) 98 - 107 mmol/L    CO2 18 (L) 20 - 28 mmol/L    Anion Gap 16 9 - 18 mmol/L    Glucose 306 (H) 70 - 99 mg/dL    BUN 75 (H) 8 - 23 MG/DL    Creatinine 2.92 (H) 0.80 - 1.30 MG/DL    Est, Glom Filt Rate 22 (L) >60 ml/min/1.73m2    Calcium 7.2 (L) 8.8 - 10.2 MG/DL   Vancomycin Level, Random    Collection Time: 06/07/24  4:24 AM   Result Value Ref Range    Vancomycin Rm 19.4 UG/ML   CBC    Collection Time: 06/07/24  4:24 AM   Result Value Ref Range    WBC 20.1 (H) 4.3 - 11.1 K/uL    RBC 5.08 4.23 - 5.6 M/uL    Hemoglobin 11.4 (L) 13.6 - 17.2 g/dL    Hematocrit 35.3 (L) 41.1 - 50.3 %    MCV 69.5 (L) 82 - 102 FL    MCH 22.4 (L) 26.1 - 32.9 PG    MCHC 32.3 31.4 - 35.0 g/dL    RDW 17.8 (H) 11.9 - 14.6 %    Platelets 111 (L) 150 - 450 K/uL    MPV 10.3 9.4 - 12.3 FL    nRBC 0.00 0.0 - 0.2 K/uL   POCT Glucose    Collection Time: 06/07/24  7:42 AM   Result Value Ref Range    POC Glucose 279 (H) 65 - 100 mg/dL    Performed by: Rashaad        Assessment     Patient Active Problem List    Diagnosis Date Noted    Lactic acidosis 06/06/2024    Dyspnea 06/05/2024    Immunosuppressed status (HCC) 06/05/2024    Volume overload 06/05/2024    Acute kidney injury (HCC) 06/05/2024

## 2024-06-08 ENCOUNTER — APPOINTMENT (OUTPATIENT)
Dept: GENERAL RADIOLOGY | Age: 74
DRG: 872 | End: 2024-06-08
Payer: MEDICARE

## 2024-06-08 LAB
AMMONIA PLAS-SCNC: 37 UMOL/L (ref 16–60)
ANION GAP SERPL CALC-SCNC: 17 MMOL/L (ref 9–18)
ANION GAP SERPL CALC-SCNC: 18 MMOL/L (ref 9–18)
ANION GAP SERPL CALC-SCNC: 23 MMOL/L (ref 9–18)
ARTERIAL PATENCY WRIST A: POSITIVE
BASE DEFICIT BLD-SCNC: 6.3 MMOL/L
BDY SITE: ABNORMAL
BUN SERPL-MCNC: 87 MG/DL (ref 8–23)
BUN SERPL-MCNC: 89 MG/DL (ref 8–23)
BUN SERPL-MCNC: 93 MG/DL (ref 8–23)
CALCIUM SERPL-MCNC: 6.5 MG/DL (ref 8.8–10.2)
CALCIUM SERPL-MCNC: 6.9 MG/DL (ref 8.8–10.2)
CALCIUM SERPL-MCNC: 6.9 MG/DL (ref 8.8–10.2)
CHLORIDE SERPL-SCNC: 87 MMOL/L (ref 98–107)
CHLORIDE SERPL-SCNC: 89 MMOL/L (ref 98–107)
CHLORIDE SERPL-SCNC: 90 MMOL/L (ref 98–107)
CO2 SERPL-SCNC: 17 MMOL/L (ref 20–28)
CO2 SERPL-SCNC: 19 MMOL/L (ref 20–28)
CO2 SERPL-SCNC: 20 MMOL/L (ref 20–28)
CREAT SERPL-MCNC: 3.73 MG/DL (ref 0.8–1.3)
CREAT SERPL-MCNC: 3.75 MG/DL (ref 0.8–1.3)
CREAT SERPL-MCNC: 4.19 MG/DL (ref 0.8–1.3)
DAT POLY-SP REAG RBC QL: NORMAL
EKG ATRIAL RATE: 81 BPM
EKG DIAGNOSIS: NORMAL
EKG Q-T INTERVAL: 450 MS
EKG QRS DURATION: 150 MS
EKG QTC CALCULATION (BAZETT): 522 MS
EKG R AXIS: -73 DEGREES
EKG T AXIS: 112 DEGREES
EKG VENTRICULAR RATE: 81 BPM
ERYTHROCYTE [DISTWIDTH] IN BLOOD BY AUTOMATED COUNT: 18.3 % (ref 11.9–14.6)
ERYTHROCYTE [DISTWIDTH] IN BLOOD BY AUTOMATED COUNT: 18.4 % (ref 11.9–14.6)
FIBRINOGEN PPP-MCNC: 105 MG/DL (ref 190–501)
GAS FLOW.O2 O2 DELIVERY SYS: ABNORMAL
GLUCOSE BLD STRIP.AUTO-MCNC: 198 MG/DL (ref 65–100)
GLUCOSE BLD STRIP.AUTO-MCNC: 212 MG/DL (ref 65–100)
GLUCOSE BLD STRIP.AUTO-MCNC: 239 MG/DL (ref 65–100)
GLUCOSE SERPL-MCNC: 216 MG/DL (ref 70–99)
GLUCOSE SERPL-MCNC: 236 MG/DL (ref 70–99)
GLUCOSE SERPL-MCNC: 247 MG/DL (ref 70–99)
HCO3 BLD-SCNC: 18.6 MMOL/L (ref 22–26)
HCT VFR BLD AUTO: 35.6 % (ref 41.1–50.3)
HCT VFR BLD AUTO: 36.4 % (ref 41.1–50.3)
HGB BLD-MCNC: 11.3 G/DL (ref 13.6–17.2)
HGB BLD-MCNC: 11.9 G/DL (ref 13.6–17.2)
INR PPP: 1.8
LACTATE SERPL-SCNC: 4.1 MMOL/L (ref 0.5–2)
LDH SERPL L TO P-CCNC: 585 U/L (ref 127–281)
MCH RBC QN AUTO: 22.2 PG (ref 26.1–32.9)
MCH RBC QN AUTO: 22.2 PG (ref 26.1–32.9)
MCHC RBC AUTO-ENTMCNC: 31.7 G/DL (ref 31.4–35)
MCHC RBC AUTO-ENTMCNC: 32.6 G/DL (ref 31.4–35)
MCV RBC AUTO: 68.1 FL (ref 82–102)
MCV RBC AUTO: 69.9 FL (ref 82–102)
NRBC # BLD: 0 K/UL (ref 0–0.2)
NRBC # BLD: 0 K/UL (ref 0–0.2)
O2/TOTAL GAS SETTING VFR VENT: 40 %
PCO2 BLD: 33.9 MMHG (ref 35–45)
PH BLD: 7.35 (ref 7.35–7.45)
PLATELET # BLD AUTO: 61 K/UL (ref 150–450)
PLATELET # BLD AUTO: 64 K/UL (ref 150–450)
PLATELETS.RETICULATED NFR BLD AUTO: 7.9 % (ref 1.8–7.9)
PMV BLD AUTO: ABNORMAL FL (ref 9.4–12.3)
PMV BLD AUTO: ABNORMAL FL (ref 9.4–12.3)
PO2 BLD: 81 MMHG (ref 75–100)
POTASSIUM SERPL-SCNC: 4.1 MMOL/L (ref 3.5–5.1)
POTASSIUM SERPL-SCNC: 4.3 MMOL/L (ref 3.5–5.1)
POTASSIUM SERPL-SCNC: 4.5 MMOL/L (ref 3.5–5.1)
PROTHROMBIN TIME: 22 SEC (ref 11.3–14.9)
RBC # BLD AUTO: 5.09 M/UL (ref 4.23–5.6)
RBC # BLD AUTO: 5.35 M/UL (ref 4.23–5.6)
SAO2 % BLD: 95.3 % (ref 95–98)
SERVICE CMNT-IMP: ABNORMAL
SODIUM SERPL-SCNC: 125 MMOL/L (ref 136–145)
SODIUM SERPL-SCNC: 125 MMOL/L (ref 136–145)
SODIUM SERPL-SCNC: 126 MMOL/L (ref 136–145)
SPECIMEN TYPE: ABNORMAL
VANCOMYCIN SERPL-MCNC: 21.7 UG/ML
WBC # BLD AUTO: 22.9 K/UL (ref 4.3–11.1)
WBC # BLD AUTO: 23.7 K/UL (ref 4.3–11.1)

## 2024-06-08 PROCEDURE — 6370000000 HC RX 637 (ALT 250 FOR IP): Performed by: INTERNAL MEDICINE

## 2024-06-08 PROCEDURE — 2580000003 HC RX 258: Performed by: INTERNAL MEDICINE

## 2024-06-08 PROCEDURE — 36415 COLL VENOUS BLD VENIPUNCTURE: CPT

## 2024-06-08 PROCEDURE — 85027 COMPLETE CBC AUTOMATED: CPT

## 2024-06-08 PROCEDURE — 80197 ASSAY OF TACROLIMUS: CPT

## 2024-06-08 PROCEDURE — 82105 ALPHA-FETOPROTEIN SERUM: CPT

## 2024-06-08 PROCEDURE — 83615 LACTATE (LD) (LDH) ENZYME: CPT

## 2024-06-08 PROCEDURE — 94761 N-INVAS EAR/PLS OXIMETRY MLT: CPT

## 2024-06-08 PROCEDURE — 82962 GLUCOSE BLOOD TEST: CPT

## 2024-06-08 PROCEDURE — 85055 RETICULATED PLATELET ASSAY: CPT

## 2024-06-08 PROCEDURE — 71045 X-RAY EXAM CHEST 1 VIEW: CPT

## 2024-06-08 PROCEDURE — 80202 ASSAY OF VANCOMYCIN: CPT

## 2024-06-08 PROCEDURE — 2700000000 HC OXYGEN THERAPY PER DAY

## 2024-06-08 PROCEDURE — 85384 FIBRINOGEN ACTIVITY: CPT

## 2024-06-08 PROCEDURE — 86880 COOMBS TEST DIRECT: CPT

## 2024-06-08 PROCEDURE — 94640 AIRWAY INHALATION TREATMENT: CPT

## 2024-06-08 PROCEDURE — 85610 PROTHROMBIN TIME: CPT

## 2024-06-08 PROCEDURE — 73522 X-RAY EXAM HIPS BI 3-4 VIEWS: CPT

## 2024-06-08 PROCEDURE — 6360000002 HC RX W HCPCS: Performed by: INTERNAL MEDICINE

## 2024-06-08 PROCEDURE — 86022 PLATELET ANTIBODIES: CPT

## 2024-06-08 PROCEDURE — 82140 ASSAY OF AMMONIA: CPT

## 2024-06-08 PROCEDURE — 6360000002 HC RX W HCPCS: Performed by: FAMILY MEDICINE

## 2024-06-08 PROCEDURE — 02HV33Z INSERTION OF INFUSION DEVICE INTO SUPERIOR VENA CAVA, PERCUTANEOUS APPROACH: ICD-10-PCS | Performed by: INTERNAL MEDICINE

## 2024-06-08 PROCEDURE — 6370000000 HC RX 637 (ALT 250 FOR IP): Performed by: HOSPITALIST

## 2024-06-08 PROCEDURE — 6360000002 HC RX W HCPCS: Performed by: EMERGENCY MEDICINE

## 2024-06-08 PROCEDURE — 6370000000 HC RX 637 (ALT 250 FOR IP): Performed by: FAMILY MEDICINE

## 2024-06-08 PROCEDURE — 36556 INSERT NON-TUNNEL CV CATH: CPT | Performed by: INTERNAL MEDICINE

## 2024-06-08 PROCEDURE — 82803 BLOOD GASES ANY COMBINATION: CPT

## 2024-06-08 PROCEDURE — 2000000000 HC ICU R&B

## 2024-06-08 PROCEDURE — 80048 BASIC METABOLIC PNL TOTAL CA: CPT

## 2024-06-08 PROCEDURE — 93010 ELECTROCARDIOGRAM REPORT: CPT | Performed by: INTERNAL MEDICINE

## 2024-06-08 PROCEDURE — 36600 WITHDRAWAL OF ARTERIAL BLOOD: CPT

## 2024-06-08 PROCEDURE — 83605 ASSAY OF LACTIC ACID: CPT

## 2024-06-08 PROCEDURE — 99291 CRITICAL CARE FIRST HOUR: CPT | Performed by: INTERNAL MEDICINE

## 2024-06-08 RX ORDER — INSULIN GLARGINE 100 [IU]/ML
15 INJECTION, SOLUTION SUBCUTANEOUS DAILY
Status: DISCONTINUED | OUTPATIENT
Start: 2024-06-08 | End: 2024-06-11 | Stop reason: HOSPADM

## 2024-06-08 RX ORDER — SODIUM CHLORIDE, SODIUM LACTATE, POTASSIUM CHLORIDE, AND CALCIUM CHLORIDE .6; .31; .03; .02 G/100ML; G/100ML; G/100ML; G/100ML
1000 INJECTION, SOLUTION INTRAVENOUS ONCE
Status: COMPLETED | OUTPATIENT
Start: 2024-06-08 | End: 2024-06-08

## 2024-06-08 RX ORDER — MORPHINE SULFATE 2 MG/ML
2 INJECTION, SOLUTION INTRAMUSCULAR; INTRAVENOUS ONCE
Status: COMPLETED | OUTPATIENT
Start: 2024-06-08 | End: 2024-06-08

## 2024-06-08 RX ADMIN — ACETAMINOPHEN 650 MG: 325 TABLET ORAL at 01:01

## 2024-06-08 RX ADMIN — PIPERACILLIN AND TAZOBACTAM 3375 MG: 3; .375 INJECTION, POWDER, LYOPHILIZED, FOR SOLUTION INTRAVENOUS at 14:05

## 2024-06-08 RX ADMIN — TRAMADOL HYDROCHLORIDE 50 MG: 50 TABLET ORAL at 03:38

## 2024-06-08 RX ADMIN — SODIUM CHLORIDE 125 MG: 9 INJECTION, SOLUTION INTRAVENOUS at 11:38

## 2024-06-08 RX ADMIN — ALBUTEROL SULFATE 2.5 MG: 2.5 SOLUTION RESPIRATORY (INHALATION) at 07:37

## 2024-06-08 RX ADMIN — PREGABALIN 25 MG: 25 CAPSULE ORAL at 21:19

## 2024-06-08 RX ADMIN — BUDESONIDE AND FORMOTEROL FUMARATE DIHYDRATE 2 PUFF: 160; 4.5 AEROSOL RESPIRATORY (INHALATION) at 07:37

## 2024-06-08 RX ADMIN — TACROLIMUS 1 MG: 1 CAPSULE ORAL at 08:47

## 2024-06-08 RX ADMIN — INSULIN LISPRO 1 UNITS: 100 INJECTION, SOLUTION INTRAVENOUS; SUBCUTANEOUS at 08:46

## 2024-06-08 RX ADMIN — PIPERACILLIN AND TAZOBACTAM 3375 MG: 3; .375 INJECTION, POWDER, LYOPHILIZED, FOR SOLUTION INTRAVENOUS at 21:24

## 2024-06-08 RX ADMIN — MORPHINE SULFATE 2 MG: 2 INJECTION, SOLUTION INTRAMUSCULAR; INTRAVENOUS at 21:19

## 2024-06-08 RX ADMIN — INSULIN LISPRO 1 UNITS: 100 INJECTION, SOLUTION INTRAVENOUS; SUBCUTANEOUS at 11:37

## 2024-06-08 RX ADMIN — INSULIN GLARGINE 15 UNITS: 100 INJECTION, SOLUTION SUBCUTANEOUS at 11:37

## 2024-06-08 RX ADMIN — SODIUM ZIRCONIUM CYCLOSILICATE 10 G: 10 POWDER, FOR SUSPENSION ORAL at 08:47

## 2024-06-08 RX ADMIN — SODIUM CHLORIDE, POTASSIUM CHLORIDE, SODIUM LACTATE AND CALCIUM CHLORIDE 1000 ML: 600; 310; 30; 20 INJECTION, SOLUTION INTRAVENOUS at 12:35

## 2024-06-08 RX ADMIN — PIPERACILLIN AND TAZOBACTAM 3375 MG: 3; .375 INJECTION, POWDER, LYOPHILIZED, FOR SOLUTION INTRAVENOUS at 05:47

## 2024-06-08 RX ADMIN — ALBUTEROL SULFATE 2.5 MG: 2.5 SOLUTION RESPIRATORY (INHALATION) at 19:29

## 2024-06-08 RX ADMIN — PREGABALIN 25 MG: 25 CAPSULE ORAL at 08:47

## 2024-06-08 RX ADMIN — Medication 2 G: at 08:46

## 2024-06-08 RX ADMIN — Medication 2 G: at 11:37

## 2024-06-08 RX ADMIN — BUMETANIDE 0.5 MG/HR: 0.25 INJECTION, SOLUTION INTRAMUSCULAR; INTRAVENOUS at 01:49

## 2024-06-08 RX ADMIN — PANTOPRAZOLE SODIUM 40 MG: 40 TABLET, DELAYED RELEASE ORAL at 05:47

## 2024-06-08 RX ADMIN — ATORVASTATIN CALCIUM 40 MG: 40 TABLET, FILM COATED ORAL at 21:19

## 2024-06-08 RX ADMIN — LEVOTHYROXINE SODIUM 100 MCG: 0.1 TABLET ORAL at 05:47

## 2024-06-08 RX ADMIN — SODIUM CHLORIDE, PRESERVATIVE FREE 10 ML: 5 INJECTION INTRAVENOUS at 21:20

## 2024-06-08 RX ADMIN — FOLIC ACID 1000 MCG: 1 TABLET ORAL at 08:46

## 2024-06-08 ASSESSMENT — PAIN SCALES - GENERAL
PAINLEVEL_OUTOF10: 0
PAINLEVEL_OUTOF10: 3
PAINLEVEL_OUTOF10: 6
PAINLEVEL_OUTOF10: 0

## 2024-06-08 ASSESSMENT — PAIN DESCRIPTION - DESCRIPTORS
DESCRIPTORS: ACHING;SORE
DESCRIPTORS: SORE

## 2024-06-08 ASSESSMENT — PAIN DESCRIPTION - ORIENTATION
ORIENTATION: RIGHT;LEFT
ORIENTATION: RIGHT

## 2024-06-08 ASSESSMENT — PAIN DESCRIPTION - LOCATION
LOCATION: HIP;LEG
LOCATION: HIP

## 2024-06-08 ASSESSMENT — PAIN - FUNCTIONAL ASSESSMENT: PAIN_FUNCTIONAL_ASSESSMENT: ACTIVITIES ARE NOT PREVENTED

## 2024-06-08 NOTE — PROGRESS NOTES
[facility] [place] Critical Care Note:: 6/8/2024  Chris Anderson  Admission Date: 6/4/2024     Length of Stay: 4 days    Background: 73 y.o. male with PMH of DM2, ESRD s/p renal transplant 2016, immunosuppression (on tacrolimus, prednisone, and cellcept), a-fib s/p watchman, CAD s/p CABG (complicated post-op course with prolonged mechanical ventilation and trach), HLD, HTN, CORAL, GERD, COPD, former smoker 30 pack year history quit in 2014.      Patient was just admitted at [facility], discharged on 6/1, with DKA. He presented to his nephrology o/p appointment on 6/4 with increased SOB, pitting edema, and overall ill appearing per provider. He was sent to the ER for evaluation. He was found to have hyperkalema and early kidney injury. He received duoneb, calcium, dextrose, regular insulin, lokelma, and bicarb IVF. After treatment, K decreased only slightly from 6.1 to 6.0. Lactic acid on arrival was elevated at 3.9, procal elevated at 2.6, WBC mildly elevated at 14.7. UA was positive for small leukocytes. Blood cultures were drawn in ER. Bicarb levels 13. He was initially prepped to be admitted to ICU but was downgraded to non-ICU bed after he improved in the ER. .    Notable PMH:  has a past medical history of Anemia, Arrhythmia, Brain bleed ([facility]), Brain bleed ([facility]), Bronchial pneumonia, CAD (coronary artery disease), Carotid artery stenosis, Chronic kidney disease, Chronic obstructive pulmonary disease (HCC), Chronic pain, GERD (gastroesophageal reflux disease), History of echocardiogram, Hypertension, NSVT (nonsustained ventricular tachycardia) ([facility]), PAD (peripheral artery disease) ([facility]), Renal transplant recipient, Respiratory failure ([facility]), Respiratory failure, post-operative ([facility]), Seizures ([facility]), Sleep apnea, Thyroid disease, and Type 2 diabetes mellitus ([facility]).    24 Hour events:   Leukocytosis continues to rise, platelets are 64K, downtrending over the last 4 days.  UOP was 190ml yesterday. Glucose  Updated: 06/07/24 0943     Special Requests NO SPECIAL REQUESTS        Culture NO GROWTH 2 DAYS             Recent Labs     06/06/24  0537   COVID19 NOT DETECTED     Ventilator Settings Ideal body weight: 79.9 kg (176 lb 2.4 oz)  Adjusted ideal body weight: 90.9 kg (200 lb 5.3 oz)  Mode FIO2 Rate Tidal Volume Pressure        28 %                  Peak airway pressure:     Minute ventilation:    ABG:No results for input(s): \"PHAPOC\", \"[id number]\", \"UO0DJLQ\", \"VQG0IPR\", \"BE\" in the last 72 hours.    Assessment and Plan:  (Medical Decision Making)   Impression: 73 y.o. male with past kidney transplant. Admitted with severe hyponatremia, hyperkalemia, lactic acidosis    NEURO:   No isuses    CV:   Volume Status: trend lactic acid prior to any further fluids.  Diuretics held today.  CAD with history of CABG: On Plavix, Lipitor, aspirin,    PULM:   No acute pulmonary issues.  CT scan with small pleural effusions.  On room air.  Tachypnea and dyspnea is due to respiratory compensation for metabolic acidosis.    RENAL:  ESDRAS: Being followed by nephrology.  History of kidney transplant.  CellCept on hold. Holding tacro with repeat level pending  Lactic acidosis: repeat now  Electrolytes: Severe hyponatremia with hyperkalemia.  Nephrology managing.    GI:   Nutrition: Regular diet  Cirrhosis:  will check ammonia, LFTs  Possible liver mass:  Abd MRI recommended.  Will check AFP now    HEME:   Anemia: Mild, monitor  Anticoagulation: thrombocytopenic so not on heparin currently  Thrombocytopenia: check for MAHA, HIT and monitor closely. Checking PT/INR, smear for schistocytes, fibrinogen, FSP, DIANE, IPF    ID:   Sepsis:  unclear source on vanco/zosyn.      ENDO:   DM: Continue insulin.  Add long-acting insulin today, lantus 15U.  Hyponatremia, continue Synthroid    Skin: no decub, turns, preventive care    Prophy: None at present.  Will need at least SCDs if cannot start anticoagulation.  Per primary team      Full Code    The

## 2024-06-08 NOTE — PROGRESS NOTES
Subjective:   Daily Progress Note: 6/8/2024 10:16 AM    Seen on ICU. Awake and alert, uncomfortable    A little more comfortable today  No CP + SOB  No Abd pain  MS -      Current Facility-Administered Medications   Medication Dose Route Frequency    ferric gluconate (FERRLECIT) 125 mg in sodium chloride 0.9 % 100 mL IVPB  125 mg IntraVENous Daily    insulin glargine (LANTUS) injection vial 15 Units  15 Units SubCUTAneous Daily    [START ON 6/9/2024] vancomycin (VANCOCIN) 1,000 mg in sodium chloride 0.9 % 250 mL IVPB (Lock1Bnc)  1,000 mg IntraVENous Once    bumetanide (BUMEX) 12.5 mg in 50 mL infusion  0.5 mg/hr IntraVENous Continuous    sodium chloride tablet 2 g  2 g Oral TID WC    piperacillin-tazobactam (ZOSYN) 3,375 mg in sodium chloride 0.9 % 50 mL IVPB (mini-bag)  3,375 mg IntraVENous Q8H    vancomycin (VANCOCIN) intermittent dosing (placeholder)   Other RX Placeholder    pregabalin (LYRICA) capsule 25 mg  25 mg Oral BID    traMADol (ULTRAM) tablet 50 mg  50 mg Oral Q6H PRN    diatrizoate meglumine-sodium (GASTROGRAFIN) 66-10 % solution 15 mL  15 mL Oral ONCE PRN    albuterol (PROVENTIL) (2.5 MG/3ML) 0.083% nebulizer solution 2.5 mg  2.5 mg Nebulization Q4H PRN    albuterol (PROVENTIL) (2.5 MG/3ML) 0.083% nebulizer solution 2.5 mg  2.5 mg Nebulization BID RT    [Held by provider] aspirin chewable tablet 81 mg  81 mg Oral Daily    atorvastatin (LIPITOR) tablet 40 mg  40 mg Oral Nightly    budesonide-formoterol (SYMBICORT) 160-4.5 MCG/ACT inhaler 2 puff  2 puff Inhalation BID RT    [Held by provider] clopidogrel (PLAVIX) tablet 75 mg  75 mg Oral Nightly    folic acid (FOLVITE) tablet 1,000 mcg  1,000 mcg Oral Daily    levothyroxine (SYNTHROID) tablet 100 mcg  100 mcg Oral QAM AC    [Held by provider] mycophenolate (CELLCEPT) capsule 500 mg  500 mg Oral BID    pantoprazole (PROTONIX) tablet 40 mg  40 mg Oral QAM AC    [Held by provider] tacrolimus (PROGRAF) capsule 1 mg  1 mg Oral Q12H    sodium chloride flush  ml/min/1.73m2    Calcium 7.1 (L) 8.8 - 10.2 MG/DL   Basic Metabolic Panel    Collection Time: 06/08/24  5:10 AM   Result Value Ref Range    Sodium 125 (L) 136 - 145 mmol/L    Potassium 4.5 3.5 - 5.1 mmol/L    Chloride 89 (L) 98 - 107 mmol/L    CO2 17 (L) 20 - 28 mmol/L    Anion Gap 18 9 - 18 mmol/L    Glucose 247 (H) 70 - 99 mg/dL    BUN 87 (H) 8 - 23 MG/DL    Creatinine 3.73 (H) 0.80 - 1.30 MG/DL    Est, Glom Filt Rate 17 (L) >60 ml/min/1.73m2    Calcium 6.9 (L) 8.8 - 10.2 MG/DL   CBC    Collection Time: 06/08/24  5:10 AM   Result Value Ref Range    WBC 22.9 (H) 4.3 - 11.1 K/uL    RBC 5.09 4.23 - 5.6 M/uL    Hemoglobin 11.3 (L) 13.6 - 17.2 g/dL    Hematocrit 35.6 (L) 41.1 - 50.3 %    MCV 69.9 (L) 82 - 102 FL    MCH 22.2 (L) 26.1 - 32.9 PG    MCHC 31.7 31.4 - 35.0 g/dL    RDW 18.4 (H) 11.9 - 14.6 %    Platelets 64 (L) 150 - 450 K/uL    MPV Unable to calculate. Recommend adding IPF. 9.4 - 12.3 FL    nRBC 0.00 0.0 - 0.2 K/uL   Vancomycin Level, Random    Collection Time: 06/08/24  5:10 AM   Result Value Ref Range    Vancomycin Rm 21.7 UG/ML   POCT Glucose    Collection Time: 06/08/24  8:38 AM   Result Value Ref Range    POC Glucose 239 (H) 65 - 100 mg/dL    Performed by: Larose ([place])        Assessment     Patient Active Problem List    Diagnosis Date Noted    Liver mass 06/07/2024    Calculus of gallbladder without cholecystitis without obstruction 06/07/2024    Abnormal LFTs 06/07/2024    Cirrhosis of liver with ascites (HCC) 06/07/2024    Lactic acidosis 06/06/2024    Dyspnea 06/05/2024    Immunosuppressed status (HCC) 06/05/2024    Volume overload 06/05/2024    Acute kidney injury ([facility]) 06/05/2024    Hyperkalemia 06/04/2024    Elevated liver enzymes 06/04/2024    Sepsis secondary to UTI ([facility]) 06/04/2024    UTI (urinary tract infection) 06/04/2024    Metabolic acidosis 06/04/2024    Neuropathy 12/19/2019    PAD (peripheral artery disease) ([facility]) 12/19/2019    CAD (coronary artery disease) 12/19/2019

## 2024-06-08 NOTE — PROCEDURES
Procedure: Hemodialysis catheter placement (CPT - 96794)  6/8/2024    Indication:  acute renal failure    Chlorohexidine Skin Antiseptic: Yes  Hand Hygiene: Yes  Maximal Barrier Precautions: Yes  Optimal Catheter Site Selection: Yes  Sterile Ultrasound Technique: Yes    Location:  right internal jugular    Time out done and correct patient/location for procedure.    Area prepped and sterilized with chlorhexedine. Sterile drapes applied.  Patient given local lidocane for anesthesia. Using Seldinger technique triple lumen lumen dialysis catheter inserted. Guidewire removed. All ports with blood return and lines flushed. Line sutured in place. Patient tolerated procedure well, no complications.   Estimated Blood loss: 3ml.    Milagros Gordon MD

## 2024-06-08 NOTE — PROGRESS NOTES
[facility] DAILY FOLLOW UP RENAL INSUFFICIENCY PATIENT   [facility]   Pharmacy Pharmacokinetic Monitoring Service - Vancomycin    Consulting Provider: Dr. Aguilar   Indication: sepsis  Target Concentration: Random level <= 20 mg/L  Day of Therapy: 5 of 7  Additional Antimicrobials: piperacillin-tazobactam    Pertinent Laboratory Values:   Wt Readings from Last 1 Encounters:   06/05/24 107.3 kg (236 lb 9.6 oz)     Temp Readings from Last 1 Encounters:   06/08/24 97.9 °F (36.6 °C) (Oral)     Recent Labs     06/06/24  0346 06/06/24  0631 06/06/24  1314 06/06/24  1710 06/07/24  0424 06/07/24  1642 06/07/24  2224 06/08/24  0510   BUN 68*  --  70* 71*  71* 76*  75* 81* 85* 87*   CREATININE 2.63*  --  2.81* 2.84*  2.85* 3.01*  2.92* 3.40* 3.51* 3.73*   WBC 15.3*  --   --   --  20.1*  --   --  22.9*   PROCAL  --   --  3.06* 3.19*  --   --   --   --    LACTA 3.8* 5.0* 4.7*  --   --   --   --   --        Lab Results   Component Value Date/Time    [facility] 11.6 06/06/2024 05:10 PM    VANCORANDOM 21.7 06/08/2024 05:10 AM     MRSA Nasal Swab: N/A. Non-respiratory infection    Assessment:  Date:  Dose/Freq Admin Times Level/Time:   6/4 2500 mg x 1 2023 6/5 6/6 1500 mg x 1 [address] @ 1314 = 11.1   6/7 1000 mg x 1 [address] @ 0424 = 19.4   6/8   Rd @ 0510 = 21.7   6/9 1000 mg x 1 (09)      Plan:  Concentration-guided dosing due to renal impairment  Random level is supratherapeutic   Plan to give vancomycin 1000 mg x 1 tomorrow morning   Repeat vancomycin concentrations ordered as clinically indicated    Pharmacy will continue to monitor patient and adjust therapy as indicated    Thank you for the consult,  Tomasa Harrington [facility]

## 2024-06-08 NOTE — CONSULTS
Late Entry    General Surgery Consult received 6/7/24 to evaluate for possible cholecystitis.     Chris Anderson is a 72yo male with a past medical history of HTN, HPL, DM, DKA, CKD, CAD, Afib s/p Watchman, SSS, COPD, CORAL, PAD, PVD, GERD, ICH, seizures, hypothyroidism, chronic pain, former tobacco abuse.     Patient was admitted 6/4/24 after presenting to the ED meeting sepsis criteria with increasing peripheral edema and leg swelling with ESDRAS. Also noted to have recent admission at [facility] for DKA.     6/5/24 CT Chest  IMPRESSION:  Small volume ascites is noted.     Native kidneys are atrophied. Transplant kidney is normal.     There is cholelithiasis without evidence of acute cholecystitis.     There are small bilateral pleural effusions with associated atelectasis. Pleural  plaque calcifications are present in the left lung.    6/6/24 Abdominal US  IMPRESSION:  Edematous gallbladder wall with cholelithiasis. Positive sonographic  Kevin sign.     Cirrhotic liver with possible right lobe liver mass. MRI of the abdomen  recommended to further evaluate. Hepatocellular carcinoma not entirely ruled  out.     Findings suspicious for partial occlusion or recanalization of the main portal  vein. MRI of the abdomen recommended to further evaluate     Small amount of ascites.     Pancreas atrophy.     Atrophic kidneys    6/7/24 Tbili 3.1. Tbili has increased daily for last 3 days.    US findings may represent cholecystitis versus intrinsic liver disease. Consider HIDA. Also noting questionable right hepatic lobe mass measuring 10 cm in greatest diameter, partial occlusion of main portal vein measuring 17 mm in diameter, small ascites.  Definitive biopsy would be ideal prior to cholecystectomy.     Pt taking Plavix after Watchman procedure. Plavix washout needed prior to surgical intervention.    Pt information reviewed by Dr Miller who feels a more complete work up is needed prior to consideration of cholecystectomy.

## 2024-06-08 NOTE — PROGRESS NOTES
Labs reviewed:  ABG- mild metabolic acidosis with respiratory compensation    Immature platelet fraction normal  Justin negative  Lactic acid elevated at 4.1  PT/INR 22/1.8  LD elevated at 585  Fibrinogen 105    No need for product transfusion currently (aside from iron ordered by Dr. Weiss).  No excessive bleeding with R IJ HD catheter placement.   Will monitor platelets closely.    Milagros Gordon MD

## 2024-06-08 NOTE — PROGRESS NOTES
Hospitalist Progress Note   Admit Date:  2024  5:04 PM   Name:  Chris Anderson   Age:  73 y.o.  Sex:  male  :  1950   MRN:  [id number]   Room:  [facility]/    Presenting/Chief Complaint: Shortness of Breath and Leg Swelling     Reason(s) for Admission: Dehydration [E86.0]  Hyperkalemia [E87.5]  Acute kidney injury (HCC) [N17.9]  Immunosuppressed status (HCC) [D84.9]  Sepsis secondary to UTI (HCC) [A41.9, N39.0]  Elevated lactic acid level [R79.89]     Hospital Course:   Chris Anderson is a 73 y.o. male with medical history of renal transplant 2016 [facility] on CellCept/tacrolimus/prednisone, CAD s/p CABG, A-fib s/p Watchman and pacemaker in place, COPD who presented with dyspnea from nephrology clinic.  He had been recently admitted to [facility]  -  for DKA associated with ESDRAS.  He had a follow-up with nephrology on  and was advised to come to the ED for dyspnea.  Patient admits to dysuria with diffuse abdominal pain and emesisx1 episode.  He still makes some urine.  Denies fever.    In the ED, CXR unremarkable.  Creatinine up to 1.8, bicarb 1 4, sodium 121, potassium 6.1 s/p treatment in the ED came down to 4.9.  Patient met sepsis criteria admission due to lactic acidosis 3.9 and leukocytosis 14.7 K.  UA concerning for UTI.  Patient received Vanco/cefepime in ED.    Patient was admitted with severe sepsis secondary to UTI associated with ESDRAS and CKD stage III associated with metabolic acidosis/lactic acidosis, hyperkalemia, dyspnea.  History of kidney transplant.  Nephrology and pulmonology were consulted.  Patient was started empirically on Rocephin for UTI.  Blood cultures NGTD thus far.  Prompt treatment for hyperkalemia initiated.  Patient was started on bicarb drip.    Subjective & 24hr Events:     Met with patient and partner at bedside.  He states that abdominal discomfort has improved significantly.  His main complaints are musculoskeletal stiffness and cramping due to being  in a hospital bed for some time.  No other acute complaints.  Discussed plan for ongoing evaluation and treatment.    Assessment & Plan:     Severe sepsis secondary to UTI met sepsis criteria with  Leukocytosis 14.7, RR >20 lactic acid 3.9 with urinary source.  Notably immunocompromised.   ABx: Rocephin (6/4-6/6) Vancomycin and Zosyn (6/6...)  Blood cultures: NGTD  Urine culture without growth  Pulmonology on board    Possible cholecystitis  Noted on Abd US 6/6 with cholelithiasis, GB wall thickening, positive sonographic Kevin sign  Appreciate pending General Surgery evaluation    Possible liver mass  US showed cirrhotic liver and possible mass in right hepatic lobe, as well as 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  Appreciate GI consultation  Unfortunately patient unable to tolerate MRI due to pacemaker  May discuss possible alternative imaging modalities with GI  Hepatitis panel, serologies, tumor markers pending    Hyperkalemia   S/p calcium gluconate, DuoNebs, Lasix, albuterol, insulin or 10 units/dextrose, Lokelma in the ED.  Resolved  Monitor on telemetry  Continue albuterol nebs  CTM BMP closely    Severe sepsis secondary to UTI met sepsis criteria with  Leukocytosis 14.7, RR >20 lactic acid 3.9 with urinary source.  Notably immunocompromised.   ABx: Rocephin (6/4-6/6) Vancomycin and Zosyn (6/6...)  Blood cultures: NGTD  Urine culture without growth    Metabolic acidosis  Lactic acidosis  Most likely d/t sepsis and acute renal failure with liver disease contributory    ESDRAS on CKD 3  Kidney transplant recipient  Creatinine on admission 1.80 (baseline ~1.2-1.5)  S/p renal transplant in 2016 at [facility]  Nephrology on board, appreciate recs  Received burst IV hydrocortisone per Nephrology  Holding home Cellcept  Continue home tacrolimus  CTM BMP  Stopped Bumex gtt  May require dialysis this admission    Dyspnea  Unclear etiology. CXR shows no acute findings.  ABG consistent with metabolic acidosis with  Elevated liver enzymes    Sepsis secondary to UTI (HCC)    UTI (urinary tract infection)    Metabolic acidosis    Dyspnea    Immunosuppressed status (HCC)    Volume overload    Acute kidney injury (HCC)    Lactic acidosis    Liver mass    Calculus of gallbladder without cholecystitis without obstruction    Abnormal LFTs    Cirrhosis of liver with ascites (HCC)  Resolved Problems:    * No resolved hospital problems. *      Objective:   Patient Vitals for the past 24 hrs:   Temp Pulse Resp BP SpO2   06/08/24 1130 97.7 °F (36.5 °C) 100 17 (!) 115/54 --   06/08/24 1100 -- 92 29 -- --   06/08/24 1030 -- 85 27 (!) 103/56 --   06/08/24 1000 -- 76 14 (!) 102/55 --   06/08/24 0930 -- 87 24 (!) 90/51 98 %   06/08/24 0900 -- 96 (!) 31 (!) 112/53 96 %   06/08/24 0830 -- 90 (!) 34 (!) 111/56 91 %   06/08/24 0800 -- 94 20 (!) 96/59 92 %   06/08/24 0737 -- -- 27 -- --   06/08/24 0730 -- 85 12 (!) 100/51 95 %   06/08/24 0700 97.9 °F (36.6 °C) 83 13 (!) 105/47 95 %   06/08/24 0600 -- 91 17 (!) 106/56 95 %   06/08/24 0530 -- 91 25 (!) 117/55 93 %   06/08/24 0400 -- 88 15 (!) 100/58 96 %   06/08/24 0330 97.9 °F (36.6 °C) 67 30 (!) 102/57 94 %   06/08/24 0230 -- 80 -- (!) 115/58 92 %   06/08/24 0130 -- 97 28 (!) 119/57 96 %   06/08/24 0100 -- 75 21 (!) 91/47 95 %   06/08/24 0030 -- 96 -- (!) 102/55 91 %   06/07/24 2330 -- 86 18 (!) 120/59 95 %   06/07/24 2300 97.6 °F (36.4 °C) 75 20 (!) 101/56 96 %   06/07/24 2245 -- 88 21 (!) 86/50 95 %   06/07/24 2230 -- 80 20 (!) 86/51 95 %   06/07/24 2200 -- 94 25 (!) 114/53 94 %   06/07/24 2130 -- 79 14 129/69 95 %   06/07/24 1925 -- 78 24 -- 95 %   06/07/24 1900 97.9 °F (36.6 °C) 89 26 116/72 (!) 89 %   06/07/24 1800 -- 88 28 117/60 94 %   06/07/24 1730 -- 73 30 135/61 95 %   06/07/24 1700 -- 67 27 122/60 96 %   06/07/24 1630 -- 76 (!) 32 (!) 111/58 94 %   06/07/24 1600 97.7 °F (36.5 °C) 69 27 (!) 120/53 94 %   06/07/24 1530 -- 92 (!) 31 (!) 127/58 95 %   06/07/24 1500 -- 76 28 -- 91 %   06/07/24  7.35 7.35 - 7.45      POC pCO2 33.9 (L) 35 - 45 MMHG    POC PO2 81 75 - 100 MMHG    POC HCO3 18.6 (L) 22 - 26 MMOL/L    POC O2 SAT 95.3 95 - 98 %    Base Deficit (POC) 6.3 mmol/L    POC Kraig's Test Positive      Site RIGHT RADIAL      Specimen type: ARTERIAL      Performed by: Angel Luis        Recent Labs     06/06/24  0537   COVID19 NOT DETECTED         Current Meds:  Current Facility-Administered Medications   Medication Dose Route Frequency    ferric gluconate (FERRLECIT) 125 mg in sodium chloride 0.9 % 100 mL IVPB  125 mg IntraVENous Daily    insulin glargine (LANTUS) injection vial 15 Units  15 Units SubCUTAneous Daily    [START ON 6/9/2024] vancomycin (VANCOCIN) 1,000 mg in sodium chloride 0.9 % 250 mL IVPB ([id number])  1,000 mg IntraVENous Once    lactated ringers bolus 1,000 mL  1,000 mL IntraVENous Once    bumetanide (BUMEX) 12.5 mg in 50 mL infusion  0.5 mg/hr IntraVENous Continuous    sodium chloride tablet 2 g  2 g Oral TID WC    piperacillin-tazobactam (ZOSYN) 3,375 mg in sodium chloride 0.9 % 50 mL IVPB (mini-bag)  3,375 mg IntraVENous Q8H    vancomycin (VANCOCIN) intermittent dosing (placeholder)   Other RX Placeholder    pregabalin (LYRICA) capsule 25 mg  25 mg Oral BID    traMADol (ULTRAM) tablet 50 mg  50 mg Oral Q6H PRN    diatrizoate meglumine-sodium (GASTROGRAFIN) 66-10 % solution 15 mL  15 mL Oral ONCE PRN    albuterol (PROVENTIL) (2.5 MG/3ML) 0.083% nebulizer solution 2.5 mg  2.5 mg Nebulization Q4H PRN    albuterol (PROVENTIL) (2.5 MG/3ML) 0.083% nebulizer solution 2.5 mg  2.5 mg Nebulization BID RT    [Held by provider] aspirin chewable tablet 81 mg  81 mg Oral Daily    atorvastatin (LIPITOR) tablet 40 mg  40 mg Oral Nightly    budesonide-formoterol (SYMBICORT) 160-4.5 MCG/ACT inhaler 2 puff  2 puff Inhalation BID RT    [Held by provider] clopidogrel (PLAVIX) tablet 75 mg  75 mg Oral Nightly    folic acid (FOLVITE) tablet 1,000 mcg  1,000 mcg Oral Daily    levothyroxine (SYNTHROID)

## 2024-06-09 ENCOUNTER — HOSPITAL ENCOUNTER (INPATIENT)
Dept: NUCLEAR MEDICINE | Age: 74
Discharge: HOME | DRG: 872 | End: 2024-06-12
Payer: MEDICARE

## 2024-06-09 ENCOUNTER — APPOINTMENT (OUTPATIENT)
Dept: GENERAL RADIOLOGY | Age: 74
DRG: 872 | End: 2024-06-09
Payer: MEDICARE

## 2024-06-09 LAB
ALBUMIN SERPL-MCNC: 2 G/DL (ref 3.2–4.6)
ALBUMIN/GLOB SERPL: 1 (ref 1–1.9)
ALP SERPL-CCNC: 813 U/L (ref 40–129)
ALT SERPL-CCNC: 90 U/L (ref 12–65)
ANION GAP SERPL CALC-SCNC: 17 MMOL/L (ref 9–18)
ANION GAP SERPL CALC-SCNC: 20 MMOL/L (ref 9–18)
APTT PPP: 50.6 SEC (ref 23.3–37.4)
ARTERIAL PATENCY WRIST A: POSITIVE
AST SERPL-CCNC: 153 U/L (ref 15–37)
BACTERIA SPEC CULT: NORMAL
BACTERIA SPEC CULT: NORMAL
BASE DEFICIT BLD-SCNC: 6.7 MMOL/L
BASOPHILS # BLD: 0 K/UL (ref 0–0.2)
BASOPHILS NFR BLD: 0 % (ref 0–2)
BDY SITE: ABNORMAL
BILIRUB DIRECT SERPL-MCNC: 4.5 MG/DL (ref 0–0.4)
BILIRUB SERPL-MCNC: 5 MG/DL (ref 0–1.2)
BUN SERPL-MCNC: 67 MG/DL (ref 8–23)
BUN SERPL-MCNC: 97 MG/DL (ref 8–23)
CALCIUM SERPL-MCNC: 6.5 MG/DL (ref 8.8–10.2)
CALCIUM SERPL-MCNC: 6.7 MG/DL (ref 8.8–10.2)
CHLORIDE SERPL-SCNC: 89 MMOL/L (ref 98–107)
CHLORIDE SERPL-SCNC: 93 MMOL/L (ref 98–107)
CO2 SERPL-SCNC: 17 MMOL/L (ref 20–28)
CO2 SERPL-SCNC: 21 MMOL/L (ref 20–28)
CREAT SERPL-MCNC: 3.52 MG/DL (ref 0.8–1.3)
CREAT SERPL-MCNC: 4.5 MG/DL (ref 0.8–1.3)
DIFFERENTIAL METHOD BLD: ABNORMAL
EOSINOPHIL # BLD: 0.9 K/UL (ref 0–0.8)
EOSINOPHIL NFR BLD: 4 % (ref 0.5–7.8)
ERYTHROCYTE [DISTWIDTH] IN BLOOD BY AUTOMATED COUNT: 19.1 % (ref 11.9–14.6)
FIBRINOGEN PPP-MCNC: 98 MG/DL (ref 190–501)
GAS FLOW.O2 O2 DELIVERY SYS: ABNORMAL
GLOBULIN SER CALC-MCNC: 2 G/DL (ref 2.3–3.5)
GLUCOSE BLD STRIP.AUTO-MCNC: 120 MG/DL (ref 65–100)
GLUCOSE BLD STRIP.AUTO-MCNC: 155 MG/DL (ref 65–100)
GLUCOSE BLD STRIP.AUTO-MCNC: 251 MG/DL (ref 65–100)
GLUCOSE BLD STRIP.AUTO-MCNC: 78 MG/DL (ref 65–100)
GLUCOSE SERPL-MCNC: 107 MG/DL (ref 70–99)
GLUCOSE SERPL-MCNC: 187 MG/DL (ref 70–99)
HCO3 BLD-SCNC: 19.9 MMOL/L (ref 22–26)
HCT VFR BLD AUTO: 36.5 % (ref 41.1–50.3)
HGB BLD-MCNC: 11.3 G/DL (ref 13.6–17.2)
IMM GRANULOCYTES # BLD AUTO: 0.3 K/UL (ref 0–0.5)
IMM GRANULOCYTES NFR BLD AUTO: 1 % (ref 0–5)
INR PPP: 1.7
LACTATE SERPL-SCNC: 4.5 MMOL/L (ref 0.5–2)
LDH SERPL L TO P-CCNC: 574 U/L (ref 127–281)
LYMPHOCYTES # BLD: 1.2 K/UL (ref 0.5–4.6)
LYMPHOCYTES NFR BLD: 5 % (ref 13–44)
MCH RBC QN AUTO: 22.2 PG (ref 26.1–32.9)
MCHC RBC AUTO-ENTMCNC: 31 G/DL (ref 31.4–35)
MCV RBC AUTO: 71.6 FL (ref 82–102)
MITOCHONDRIA M2 IGG SER-ACNC: <20 UNITS (ref 0–20)
MONOCYTES # BLD: 1.5 K/UL (ref 0.1–1.3)
MONOCYTES NFR BLD: 6 % (ref 4–12)
NEUTS SEG # BLD: 21.1 K/UL (ref 1.7–8.2)
NEUTS SEG NFR BLD: 85 % (ref 43–78)
NRBC # BLD: 0 K/UL (ref 0–0.2)
PCO2 BLD: 43.4 MMHG (ref 35–45)
PH BLD: 7.27 (ref 7.35–7.45)
PLATELET # BLD AUTO: 44 K/UL (ref 150–450)
PMV BLD AUTO: ABNORMAL FL (ref 9.4–12.3)
PO2 BLD: 69 MMHG (ref 75–100)
POTASSIUM SERPL-SCNC: 4 MMOL/L (ref 3.5–5.1)
POTASSIUM SERPL-SCNC: 4.2 MMOL/L (ref 3.5–5.1)
PROT SERPL-MCNC: 3.9 G/DL (ref 6.3–8.2)
PROTHROMBIN TIME: 21.3 SEC (ref 11.3–14.9)
RBC # BLD AUTO: 5.1 M/UL (ref 4.23–5.6)
SAO2 % BLD: 90.8 % (ref 95–98)
SERVICE CMNT-IMP: ABNORMAL
SERVICE CMNT-IMP: NORMAL
SMA IGG SER-ACNC: 4 UNITS (ref 0–19)
SODIUM SERPL-SCNC: 126 MMOL/L (ref 136–145)
SODIUM SERPL-SCNC: 131 MMOL/L (ref 136–145)
SPECIMEN TYPE: ABNORMAL
TACROLIMUS BLD-MCNC: 10.5 NG/ML (ref 2–20)
TACROLIMUS BLOOD: NORMAL
WBC # BLD AUTO: 24.9 K/UL (ref 4.3–11.1)

## 2024-06-09 PROCEDURE — A9537 TC99M MEBROFENIN: HCPCS | Performed by: INTERNAL MEDICINE

## 2024-06-09 PROCEDURE — 5A0935A ASSISTANCE WITH RESPIRATORY VENTILATION, LESS THAN 24 CONSECUTIVE HOURS, HIGH NASAL FLOW/VELOCITY: ICD-10-PCS | Performed by: INTERNAL MEDICINE

## 2024-06-09 PROCEDURE — 36600 WITHDRAWAL OF ARTERIAL BLOOD: CPT

## 2024-06-09 PROCEDURE — 6370000000 HC RX 637 (ALT 250 FOR IP): Performed by: INTERNAL MEDICINE

## 2024-06-09 PROCEDURE — 83615 LACTATE (LD) (LDH) ENZYME: CPT

## 2024-06-09 PROCEDURE — 2700000000 HC OXYGEN THERAPY PER DAY

## 2024-06-09 PROCEDURE — 2580000003 HC RX 258: Performed by: INTERNAL MEDICINE

## 2024-06-09 PROCEDURE — 85610 PROTHROMBIN TIME: CPT

## 2024-06-09 PROCEDURE — 94640 AIRWAY INHALATION TREATMENT: CPT

## 2024-06-09 PROCEDURE — 90945 DIALYSIS ONE EVALUATION: CPT

## 2024-06-09 PROCEDURE — 6360000002 HC RX W HCPCS: Performed by: INTERNAL MEDICINE

## 2024-06-09 PROCEDURE — 82962 GLUCOSE BLOOD TEST: CPT

## 2024-06-09 PROCEDURE — 36415 COLL VENOUS BLD VENIPUNCTURE: CPT

## 2024-06-09 PROCEDURE — 6360000002 HC RX W HCPCS: Performed by: FAMILY MEDICINE

## 2024-06-09 PROCEDURE — 99233 SBSQ HOSP IP/OBS HIGH 50: CPT | Performed by: INTERNAL MEDICINE

## 2024-06-09 PROCEDURE — 5A1935Z RESPIRATORY VENTILATION, LESS THAN 24 CONSECUTIVE HOURS: ICD-10-PCS | Performed by: INTERNAL MEDICINE

## 2024-06-09 PROCEDURE — 78226 HEPATOBILIARY SYSTEM IMAGING: CPT

## 2024-06-09 PROCEDURE — 71045 X-RAY EXAM CHEST 1 VIEW: CPT

## 2024-06-09 PROCEDURE — 2500000003 HC RX 250 WO HCPCS: Performed by: INTERNAL MEDICINE

## 2024-06-09 PROCEDURE — 82803 BLOOD GASES ANY COMBINATION: CPT

## 2024-06-09 PROCEDURE — 83605 ASSAY OF LACTIC ACID: CPT

## 2024-06-09 PROCEDURE — 2000000000 HC ICU R&B

## 2024-06-09 PROCEDURE — 80048 BASIC METABOLIC PNL TOTAL CA: CPT

## 2024-06-09 PROCEDURE — 5A1D70Z PERFORMANCE OF URINARY FILTRATION, INTERMITTENT, LESS THAN 6 HOURS PER DAY: ICD-10-PCS | Performed by: INTERNAL MEDICINE

## 2024-06-09 PROCEDURE — 85025 COMPLETE CBC W/AUTO DIFF WBC: CPT

## 2024-06-09 PROCEDURE — 85730 THROMBOPLASTIN TIME PARTIAL: CPT

## 2024-06-09 PROCEDURE — 80076 HEPATIC FUNCTION PANEL: CPT

## 2024-06-09 PROCEDURE — 85384 FIBRINOGEN ACTIVITY: CPT

## 2024-06-09 PROCEDURE — 87449 NOS EACH ORGANISM AG IA: CPT

## 2024-06-09 PROCEDURE — 0BH17EZ INSERTION OF ENDOTRACHEAL AIRWAY INTO TRACHEA, VIA NATURAL OR ARTIFICIAL OPENING: ICD-10-PCS | Performed by: INTERNAL MEDICINE

## 2024-06-09 PROCEDURE — 3430000000 HC RX DIAGNOSTIC RADIOPHARMACEUTICAL: Performed by: INTERNAL MEDICINE

## 2024-06-09 RX ORDER — NOREPINEPHRINE BITARTRATE 0.02 MG/ML
1-100 INJECTION, SOLUTION INTRAVENOUS CONTINUOUS
Status: DISCONTINUED | OUTPATIENT
Start: 2024-06-09 | End: 2024-06-11 | Stop reason: HOSPADM

## 2024-06-09 RX ORDER — MORPHINE SULFATE 2 MG/ML
2 INJECTION, SOLUTION INTRAMUSCULAR; INTRAVENOUS ONCE
Status: COMPLETED | OUTPATIENT
Start: 2024-06-09 | End: 2024-06-09

## 2024-06-09 RX ORDER — 0.9 % SODIUM CHLORIDE 0.9 %
100 INTRAVENOUS SOLUTION INTRAVENOUS PRN
Status: DISCONTINUED | OUTPATIENT
Start: 2024-06-09 | End: 2024-06-11 | Stop reason: HOSPADM

## 2024-06-09 RX ORDER — KIT FOR THE PREPARATION OF TECHNETIUM TC 99M MEBROFENIN 45 MG/10ML
6 INJECTION, POWDER, LYOPHILIZED, FOR SOLUTION INTRAVENOUS
Status: COMPLETED | OUTPATIENT
Start: 2024-06-09 | End: 2024-06-09

## 2024-06-09 RX ADMIN — PIPERACILLIN AND TAZOBACTAM 3375 MG: 3; .375 INJECTION, POWDER, LYOPHILIZED, FOR SOLUTION INTRAVENOUS at 05:32

## 2024-06-09 RX ADMIN — ATORVASTATIN CALCIUM 40 MG: 40 TABLET, FILM COATED ORAL at 21:54

## 2024-06-09 RX ADMIN — MEBROFENIN 6 MILLICURIE: 45 INJECTION, POWDER, LYOPHILIZED, FOR SOLUTION INTRAVENOUS at 11:00

## 2024-06-09 RX ADMIN — BUDESONIDE AND FORMOTEROL FUMARATE DIHYDRATE 2 PUFF: 160; 4.5 AEROSOL RESPIRATORY (INHALATION) at 08:30

## 2024-06-09 RX ADMIN — PANTOPRAZOLE SODIUM 40 MG: 40 TABLET, DELAYED RELEASE ORAL at 05:36

## 2024-06-09 RX ADMIN — TRAMADOL HYDROCHLORIDE 50 MG: 50 TABLET ORAL at 22:41

## 2024-06-09 RX ADMIN — PIPERACILLIN AND TAZOBACTAM 3375 MG: 3; .375 INJECTION, POWDER, LYOPHILIZED, FOR SOLUTION INTRAVENOUS at 14:47

## 2024-06-09 RX ADMIN — SODIUM CHLORIDE: 9 INJECTION, SOLUTION INTRAVENOUS at 14:46

## 2024-06-09 RX ADMIN — PREGABALIN 25 MG: 25 CAPSULE ORAL at 07:42

## 2024-06-09 RX ADMIN — SODIUM CHLORIDE 5 MCG/MIN: 9 INJECTION, SOLUTION INTRAVENOUS at 17:09

## 2024-06-09 RX ADMIN — INSULIN GLARGINE 15 UNITS: 100 INJECTION, SOLUTION SUBCUTANEOUS at 07:41

## 2024-06-09 RX ADMIN — LEVOTHYROXINE SODIUM 100 MCG: 0.1 TABLET ORAL at 05:36

## 2024-06-09 RX ADMIN — SODIUM CHLORIDE, PRESERVATIVE FREE 10 ML: 5 INJECTION INTRAVENOUS at 21:54

## 2024-06-09 RX ADMIN — SODIUM CHLORIDE 125 MG: 9 INJECTION, SOLUTION INTRAVENOUS at 09:23

## 2024-06-09 RX ADMIN — ALBUTEROL SULFATE 2.5 MG: 2.5 SOLUTION RESPIRATORY (INHALATION) at 07:52

## 2024-06-09 RX ADMIN — ALBUTEROL SULFATE 2.5 MG: 2.5 SOLUTION RESPIRATORY (INHALATION) at 19:53

## 2024-06-09 RX ADMIN — PREGABALIN 25 MG: 25 CAPSULE ORAL at 21:54

## 2024-06-09 RX ADMIN — MEROPENEM 1000 MG: 1 INJECTION, POWDER, FOR SOLUTION INTRAVENOUS at 19:43

## 2024-06-09 RX ADMIN — BUDESONIDE AND FORMOTEROL FUMARATE DIHYDRATE 2 PUFF: 160; 4.5 AEROSOL RESPIRATORY (INHALATION) at 19:58

## 2024-06-09 RX ADMIN — TRAMADOL HYDROCHLORIDE 50 MG: 50 TABLET ORAL at 05:36

## 2024-06-09 RX ADMIN — MORPHINE SULFATE 2 MG: 2 INJECTION, SOLUTION INTRAMUSCULAR; INTRAVENOUS at 08:27

## 2024-06-09 RX ADMIN — FOLIC ACID 1000 MCG: 1 TABLET ORAL at 07:42

## 2024-06-09 ASSESSMENT — PAIN DESCRIPTION - DESCRIPTORS
DESCRIPTORS: ACHING

## 2024-06-09 ASSESSMENT — PAIN SCALES - GENERAL
PAINLEVEL_OUTOF10: 2
PAINLEVEL_OUTOF10: 0
PAINLEVEL_OUTOF10: 6
PAINLEVEL_OUTOF10: 7
PAINLEVEL_OUTOF10: 0
PAINLEVEL_OUTOF10: 6
PAINLEVEL_OUTOF10: 0

## 2024-06-09 ASSESSMENT — PAIN DESCRIPTION - ORIENTATION
ORIENTATION: RIGHT;LEFT
ORIENTATION: RIGHT;LEFT

## 2024-06-09 ASSESSMENT — PAIN DESCRIPTION - LOCATION
LOCATION: ABDOMEN
LOCATION: ABDOMEN
LOCATION: ABDOMEN;LEG
LOCATION: ABDOMEN

## 2024-06-09 NOTE — DIALYSIS
6 hr SLED initiated using  right IJ Catheter.  Verified consent on file.     Aspirated and flushed both ports without problem. Machine settings per MD order.  200 BFR,  300 DFR,  100 UF Rate.     Heparin 0 unit bolus and 0 units/hr programmed.     Dialysis nurse available as needed. Report given to DERIK Pham and machine settings verified at bedside.     BP (!) 118/54   Pulse 66   Temp 97.6 °F (36.4 °C) (Oral)   Resp (!) 32   Ht 1.854 m (6' 1\")   Wt 107.3 kg (236 lb 9.6 oz)   SpO2 94%   BMI 31.22 kg/m²     
Fall Risk

## 2024-06-09 NOTE — PROGRESS NOTES
Hospitalist Progress Note   Admit Date:  2024  5:04 PM   Name:  Chris Anderson   Age:  73 y.o.  Sex:  male  :  1950   MRN:  [id number]   Room:  [facility]/    Presenting/Chief Complaint: Shortness of Breath and Leg Swelling     Reason(s) for Admission: Dehydration [E86.0]  Hyperkalemia [E87.5]  Acute kidney injury (HCC) [N17.9]  Immunosuppressed status (HCC) [D84.9]  Sepsis secondary to UTI (HCC) [A41.9, N39.0]  Elevated lactic acid level [R79.89]     Hospital Course:   Chris Anderson is a 73 y.o. male with medical history of renal transplant 2016 [facility] on CellCept/tacrolimus/prednisone, CAD s/p CABG, A-fib s/p Watchman and pacemaker in place, COPD who presented with dyspnea from nephrology clinic.  He had been recently admitted to [facility]  -  for DKA associated with ESDRAS.  He had a follow-up with nephrology on  and was advised to come to the ED for dyspnea.  Patient admits to dysuria with diffuse abdominal pain and emesisx1 episode.  He still makes some urine.  Denies fever.    In the ED, CXR unremarkable.  Creatinine up to 1.8, bicarb 1 4, sodium 121, potassium 6.1 s/p treatment in the ED came down to 4.9.  Patient met sepsis criteria admission due to lactic acidosis 3.9 and leukocytosis 14.7 K.  UA concerning for UTI.  Patient received Vanco/cefepime in ED.    Patient was admitted with severe sepsis secondary to UTI associated with ESDRAS and CKD stage III associated with metabolic acidosis/lactic acidosis, hyperkalemia, dyspnea.  History of kidney transplant.  Nephrology and pulmonology were consulted.  Patient was started empirically on Rocephin for UTI.  Blood cultures NGTD thus far.  Prompt treatment for hyperkalemia initiated.  Patient was started on bicarb drip.    Subjective & 24hr Events:     Patient states abdominal pain improving this a.m. Complaining of some lower extremity stiffness and discomfort d/t staying in bed.     Assessment & Plan:     Severe sepsis, unclear  likely due to sepsis.  CTAP on 5/28/2024 shows hepatic steatosis  Hepatitis panel nonreactive  No MRI d/t pacemaker  CTM CMP    Hyponatremia  Sodium 120 on admission.  Most likely hypovolemia in setting of emesis.  Gradually correcting  CTM BMP  Nephrology on board, appreciate recs    Hypomagnesemia  Replace and recheck    A-fib   S/p Watchman device  Pacemaker in place  Monitor on telemetry  Not on any anticoagulation    CAD  H/O coronary artery bypass surgery  PAD  Continue home statin  Hold ASA / Plavix given thrombocytopenia    COPD  Continue home inhalers/nebs    DM II  HG A1c 8.3 on 6/5  Continue SSI  Home Lantus at decreased dosing given resolution of hyperkalemia    Thrombocytopenia  Plt downtrending since admission  Follow CBC  Concern for DIC, further w/u ordered per Pulm  No cryoglobulin unless procedures pending    Iron deficiency anemia  IV iron per Nephrology    Hypothyroidism  Continue home Synthroid    Anticipated Discharge Arrangements:   Anticipate discharge home when medically stable.  Timing unclear now.     PT/OT evals ordered?  Therapy evals ordered  Diet:  ADULT DIET; Regular; Low Potassium (Less than 3000 mg/day)  Diet NPO Exceptions are: Sips of Water with Meds  VTE prophylaxis: SCD's   Code status: Full Code      Non-peripheral Lines and Tubes (if present):      Urinary Catheter 06/06/24 Lyles (Active)     Triple Lumen Hemodialysis Catheter (Trialysis) Right Neck (Active)       Telemetry (if present):  Cardiac/Telemetry Monitor On: Bedside monitor in use        Hospital Problems:  Principal Problem:    Hyperkalemia  Active Problems:    PAD (peripheral artery disease) (HCC)    CAD (coronary artery disease)    H/O coronary artery bypass surgery    DM (diabetes mellitus) (HCC)    A-fib (HCC)    Kidney transplant recipient    COPD (chronic obstructive pulmonary disease) (HCC)    Elevated liver enzymes    Sepsis secondary to UTI (HCC)    UTI (urinary tract infection)    Metabolic acidosis     sodium chloride infusion   IntraVENous PRN    ondansetron (ZOFRAN-ODT) disintegrating tablet 4 mg  4 mg Oral Q8H PRN    Or    ondansetron (ZOFRAN) injection 4 mg  4 mg IntraVENous Q6H PRN    polyethylene glycol (GLYCOLAX) packet 17 g  17 g Oral Daily PRN    acetaminophen (TYLENOL) tablet 650 mg  650 mg Oral Q6H PRN    Or    acetaminophen (TYLENOL) suppository 650 mg  650 mg Rectal Q6H PRN    glucose chewable tablet 16 g  4 tablet Oral PRN    dextrose bolus 10% 125 mL  125 mL IntraVENous PRN    Or    dextrose bolus 10% 250 mL  250 mL IntraVENous PRN    Glucagon Emergency KIT 1 mg  1 mg SubCUTAneous PRN    dextrose 10 % infusion   IntraVENous Continuous PRN    insulin lispro (HUMALOG,ADMELOG) injection vial 0-4 Units  0-4 Units SubCUTAneous TID WC    insulin lispro (HUMALOG,ADMELOG) injection vial 0-4 Units  0-4 Units SubCUTAneous Nightly       Signed:  Bam Hanson MD    Part of this note may have been written by using a voice dictation software.  The note has been proof read but may still contain some grammatical/other typographical errors.

## 2024-06-09 NOTE — PROGRESS NOTES
[facility] Critical Care Note:: 6/9/2024  Chris Anderson  Admission Date: 6/4/2024     Length of Stay: 5 days    Background: 73 y.o. male with PMH of DM2, ESRD s/p renal transplant 2016, immunosuppression (on tacrolimus, prednisone, and cellcept), a-fib s/p watchman, CAD s/p CABG (complicated post-op course with prolonged mechanical ventilation and trach), HLD, HTN, CORAL, GERD, COPD, former smoker 30 pack year history quit in 2014.      Patient was just admitted at [facility], discharged on 6/1, with DKA. He presented to his nephrology o/p appointment on 6/4 with increased SOB, pitting edema, and overall ill appearing per provider. He was sent to the ER for evaluation. He was found to have hyperkalema and early kidney injury. He received duoneb, calcium, dextrose, regular insulin, lokelma, and bicarb IVF. After treatment, K decreased only slightly from 6.1 to 6.0. Lactic acid on arrival was elevated at 3.9, procal elevated at 2.6, WBC mildly elevated at 14.7. UA was positive for small leukocytes. Blood cultures were drawn in ER. Bicarb levels 13. He was initially prepped to be admitted to ICU but was downgraded to non-ICU bed after he improved in the ER. .    Notable PMH:  has a past medical history of Anemia, Arrhythmia, Brain bleed ([facility]), Brain bleed ([facility]), Bronchial pneumonia, CAD (coronary artery disease), Carotid artery stenosis, Chronic kidney disease, Chronic obstructive pulmonary disease (HCC), Chronic pain, GERD (gastroesophageal reflux disease), History of echocardiogram, Hypertension, NSVT (nonsustained ventricular tachycardia) ([facility]), PAD (peripheral artery disease) ([facility]), Renal transplant recipient, Respiratory failure ([facility]), Respiratory failure, post-operative ([facility]), Seizures ([facility]), Sleep apnea, Thyroid disease, and Type 2 diabetes mellitus ([facility]).    24 Hour events:   Leukocytosis continues to rise, platelets are 44K, downtrending over the last 4 days.  Had dialysis catheter placed  left lung. There is  trace bilateral pleural effusions with associated atelectasis. No focal  consolidation evident.  - MEDIASTINUM/AXILLA: No significant adenopathy. Small hiatal hernia is noted.  - HEART/VESSELS: Normal.  - CHEST WALL: Normal.    - LIVER: Normal in size and appearance.  - GALLBLADDER/BILE DUCTS: Cholelithiasis is noted. There is no pericholecystic  fluid.  - PANCREAS: Normal.  - SPLEEN: Normal.    - ADRENALS:  Normal.  - KIDNEYS/URETERS: No hydronephrosis or significant mass. Native kidneys are  atrophied. Transplant pelvic kidney demonstrate normal enhancement with no  hydronephrosis. No discrete mass seen..  - BLADDER: Normal.  - REPRODUCTIVE ORGANS: No pelvic masses.    - BOWEL: Normal caliber.  No inflammatory changes.  - LYMPH NODES: No significant retroperitoneal, mesenteric, or pelvic adenopathy.  - BONES: No fracture or significant bone lesion.  - VASCULATURE: Extensive vascular calcification present.  - OTHER: Small volume ascites is noted.    Impression  Small volume ascites is noted.    Native kidneys are atrophied. Transplant kidney is normal.    There is cholelithiasis without evidence of acute cholecystitis.    There are small bilateral pleural effusions with associated atelectasis. Pleural  plaque calcifications are present in the left lung.              Electronically signed by KIT FLORES  US RUQ    FINDINGS:   -LIVER: 21 cm. Cirrhotic liver with increased echotexture and density.  Questionable right lobe liver mass measuring 10 cm in greatest diameter. Versus  heterogeneous echotexture.  Partial occlusion of main portal vein measuring 17 mm in diameter  -BILE DUCTS: No intrahepatic bile duct dilatation.  CBD diameter = 5 mm.  -GALLBLADDER: Diffusely thickened wall with positive Kevin's sign. Gallstones  near the neck of the gallbladder. No significant enlargement of the gallbladder.    Recent Labs     06/06/24  1314 06/06/24  1710 06/07/24  0424 06/07/24  0902 06/08/24  0510

## 2024-06-09 NOTE — PROGRESS NOTES
Subjective:   Daily Progress Note: 6/9/2024 10:11 AM    Seen on ICU. Awake and alert, uncomfortable    A little more comfortable today  No CP + SOB  No Abd pain  MS -      Current Facility-Administered Medications   Medication Dose Route Frequency    ferric gluconate (FERRLECIT) 125 mg in sodium chloride 0.9 % 100 mL IVPB  125 mg IntraVENous Daily    insulin glargine (LANTUS) injection vial 15 Units  15 Units SubCUTAneous Daily    piperacillin-tazobactam (ZOSYN) 3,375 mg in sodium chloride 0.9 % 50 mL IVPB (mini-bag)  3,375 mg IntraVENous Q8H    pregabalin (LYRICA) capsule 25 mg  25 mg Oral BID    traMADol (ULTRAM) tablet 50 mg  50 mg Oral Q6H PRN    diatrizoate meglumine-sodium (GASTROGRAFIN) 66-10 % solution 15 mL  15 mL Oral ONCE PRN    albuterol (PROVENTIL) (2.5 MG/3ML) 0.083% nebulizer solution 2.5 mg  2.5 mg Nebulization Q4H PRN    albuterol (PROVENTIL) (2.5 MG/3ML) 0.083% nebulizer solution 2.5 mg  2.5 mg Nebulization BID RT    [Held by provider] aspirin chewable tablet 81 mg  81 mg Oral Daily    atorvastatin (LIPITOR) tablet 40 mg  40 mg Oral Nightly    budesonide-formoterol (SYMBICORT) 160-4.5 MCG/ACT inhaler 2 puff  2 puff Inhalation BID RT    [Held by provider] clopidogrel (PLAVIX) tablet 75 mg  75 mg Oral Nightly    folic acid (FOLVITE) tablet 1,000 mcg  1,000 mcg Oral Daily    levothyroxine (SYNTHROID) tablet 100 mcg  100 mcg Oral QAM AC    [Held by provider] mycophenolate (CELLCEPT) capsule 500 mg  500 mg Oral BID    pantoprazole (PROTONIX) tablet 40 mg  40 mg Oral QAM AC    [Held by provider] tacrolimus (PROGRAF) capsule 1 mg  1 mg Oral Q12H    sodium chloride flush 0.9 % injection 5-40 mL  5-40 mL IntraVENous 2 times per day    sodium chloride flush 0.9 % injection 5-40 mL  5-40 mL IntraVENous PRN    0.9 % sodium chloride infusion   IntraVENous PRN    ondansetron (ZOFRAN-ODT) disintegrating tablet 4 mg  4 mg Oral Q8H PRN    Or    ondansetron (ZOFRAN) injection 4 mg  4 mg IntraVENous Q6H PRN     Todd Prather, he should not be billed for his OCT since it had been done 2 days ago, but I couldn't access it initially.    INR 1.8     Ammonia    Collection Time: 06/08/24 11:08 AM   Result Value Ref Range    Ammonia 37 16 - 60 umol/L   Lactic Acid    Collection Time: 06/08/24 11:08 AM   Result Value Ref Range    Lactic Acid 4.1 (HH) 0.5 - 2.0 mmol/L   DIRECT ANTIGLOBULIN TEST    Collection Time: 06/08/24 11:08 AM   Result Value Ref Range    Polyspecific Justin NEG    Immature Platelet Fraction    Collection Time: 06/08/24 11:08 AM   Result Value Ref Range    Immature Plt. Fraction 7.9 1.8 - 7.9 %   Path Review, Smear    Collection Time: 06/08/24 11:08 AM   Result Value Ref Range    Pathologist Review PENDING    POCT Glucose    Collection Time: 06/08/24 11:29 AM   Result Value Ref Range    POC Glucose 212 (H) 65 - 100 mg/dL    Performed by: Larose ([place])    Arterial Blood Gas, POC    Collection Time: 06/08/24 12:02 PM   Result Value Ref Range    DEVICE NASAL CANNULA      FIO2 40 %    POC pH 7.35 7.35 - 7.45      POC pCO2 33.9 (L) 35 - 45 MMHG    POC PO2 81 75 - 100 MMHG    POC HCO3 18.6 (L) 22 - 26 MMOL/L    POC O2 SAT 95.3 95 - 98 %    Base Deficit (POC) 6.3 mmol/L    POC Kraig's Test Positive      Site RIGHT RADIAL      Specimen type: ARTERIAL      Performed by: Angel Luis    POCT Glucose    Collection Time: 06/08/24  5:13 PM   Result Value Ref Range    POC Glucose 198 (H) 65 - 100 mg/dL    Performed by: Buck    Basic Metabolic Panel    Collection Time: 06/08/24  5:41 PM   Result Value Ref Range    Sodium 126 (L) 136 - 145 mmol/L    Potassium 4.1 3.5 - 5.1 mmol/L    Chloride 90 (L) 98 - 107 mmol/L    CO2 19 (L) 20 - 28 mmol/L    Anion Gap 17 9 - 18 mmol/L    Glucose 216 (H) 70 - 99 mg/dL    BUN 93 (H) 8 - 23 MG/DL    Creatinine 4.19 (H) 0.80 - 1.30 MG/DL    Est, Glom Filt Rate 14 (L) >60 ml/min/1.73m2    Calcium 6.5 (L) 8.8 - 10.2 MG/DL   POCT Glucose    Collection Time: 06/09/24 12:15 AM   Result Value Ref Range    POC Glucose 251 (H) 65 - 100 mg/dL    Performed by: Guillermina    Protime-INR     Continue IV steroids. Prograf level 15 - holding pending repeat ordered 6/8.    4) Hyponatremia, asymptomatic - in the setting of oliguria. Improving.    5) Thrombocytopenia - worse daily.     6) Possible underlying cirrhosis, hepatic mass. W/U ongoing.     7) Profound iron deficiency - will give IV iron.

## 2024-06-10 ENCOUNTER — ANESTHESIA (OUTPATIENT)
Dept: ENDOSCOPY | Age: 74
DRG: 872 | End: 2024-06-10
Payer: MEDICARE

## 2024-06-10 ENCOUNTER — APPOINTMENT (OUTPATIENT)
Dept: GENERAL RADIOLOGY | Age: 74
DRG: 872 | End: 2024-06-10
Payer: MEDICARE

## 2024-06-10 ENCOUNTER — ANESTHESIA EVENT (OUTPATIENT)
Dept: ENDOSCOPY | Age: 74
DRG: 872 | End: 2024-06-10
Payer: MEDICARE

## 2024-06-10 PROBLEM — K83.09 CHOLANGITIS: Status: ACTIVE | Noted: 2024-06-04

## 2024-06-10 PROBLEM — E87.20 METABOLIC ACIDOSIS: Status: RESOLVED | Noted: 2024-06-04 | Resolved: 2024-06-10

## 2024-06-10 PROBLEM — J44.9 COPD (CHRONIC OBSTRUCTIVE PULMONARY DISEASE) (HCC): Chronic | Status: ACTIVE | Noted: 2019-12-19

## 2024-06-10 PROBLEM — Z95.1 H/O CORONARY ARTERY BYPASS SURGERY: Chronic | Status: ACTIVE | Noted: 2019-12-19

## 2024-06-10 PROBLEM — R79.89 ABNORMAL LFTS: Status: RESOLVED | Noted: 2024-06-07 | Resolved: 2024-06-10

## 2024-06-10 PROBLEM — N39.0 SEPSIS SECONDARY TO UTI (HCC): Status: RESOLVED | Noted: 2024-06-04 | Resolved: 2024-06-10

## 2024-06-10 PROBLEM — R65.21 SEVERE SEPSIS WITH SEPTIC SHOCK (CODE) (HCC): Status: ACTIVE | Noted: 2024-06-04

## 2024-06-10 PROBLEM — G62.9 NEUROPATHY: Chronic | Status: ACTIVE | Noted: 2019-12-19

## 2024-06-10 PROBLEM — I73.9 PAD (PERIPHERAL ARTERY DISEASE) (HCC): Chronic | Status: ACTIVE | Noted: 2019-12-19

## 2024-06-10 PROBLEM — E11.9 DM (DIABETES MELLITUS) (HCC): Chronic | Status: ACTIVE | Noted: 2019-12-19

## 2024-06-10 PROBLEM — E87.5 HYPERKALEMIA: Status: RESOLVED | Noted: 2024-06-04 | Resolved: 2024-06-10

## 2024-06-10 PROBLEM — N39.0 UTI (URINARY TRACT INFECTION): Status: RESOLVED | Noted: 2024-06-04 | Resolved: 2024-06-10

## 2024-06-10 PROBLEM — I25.10 CAD (CORONARY ARTERY DISEASE): Chronic | Status: ACTIVE | Noted: 2019-12-19

## 2024-06-10 PROBLEM — A41.9 SEPSIS SECONDARY TO UTI (HCC): Status: RESOLVED | Noted: 2024-06-04 | Resolved: 2024-06-10

## 2024-06-10 LAB
AFP-TM SERPL-MCNC: <1.82 NG/ML (ref 0–8.3)
AFP-TM SERPL-MCNC: <1.82 NG/ML (ref 0–8.3)
ALBUMIN SERPL-MCNC: 1.6 G/DL (ref 3.2–4.6)
ALBUMIN SERPL-MCNC: 1.9 G/DL (ref 3.2–4.6)
ALBUMIN/GLOB SERPL: 1 (ref 1–1.9)
ALBUMIN/GLOB SERPL: 1.1 (ref 1–1.9)
ALP SERPL-CCNC: 609 U/L (ref 40–129)
ALP SERPL-CCNC: 723 U/L (ref 40–129)
ALT SERPL-CCNC: 115 U/L (ref 12–65)
ALT SERPL-CCNC: 124 U/L (ref 12–65)
ANA SER QL: NEGATIVE
ANION GAP BLD CALC-SCNC: ABNORMAL MMOL/L
ANION GAP BLD CALC-SCNC: ABNORMAL MMOL/L
ANION GAP SERPL CALC-SCNC: 16 MMOL/L (ref 9–18)
ANION GAP SERPL CALC-SCNC: 19 MMOL/L (ref 9–18)
APTT PPP: 104.3 SEC (ref 23.3–37.4)
ARTERIAL PATENCY WRIST A: POSITIVE
ARTERIAL PATENCY WRIST A: POSITIVE
AST SERPL-CCNC: 291 U/L (ref 15–37)
AST SERPL-CCNC: 400 U/L (ref 15–37)
BASE DEFICIT BLD-SCNC: 5 MMOL/L
BASE DEFICIT BLD-SCNC: 9.5 MMOL/L
BASOPHILS # BLD: 0 K/UL (ref 0–0.2)
BASOPHILS NFR BLD: 0 % (ref 0–2)
BDY SITE: ABNORMAL
BDY SITE: ABNORMAL
BILIRUB DIRECT SERPL-MCNC: 5.1 MG/DL (ref 0–0.4)
BILIRUB SERPL-MCNC: 4.9 MG/DL (ref 0–1.2)
BILIRUB SERPL-MCNC: 5.6 MG/DL (ref 0–1.2)
BUN SERPL-MCNC: 56 MG/DL (ref 8–23)
BUN SERPL-MCNC: 63 MG/DL (ref 8–23)
CA-I BLD-MCNC: 0.86 MMOL/L (ref 1.12–1.32)
CA-I BLD-MCNC: 1.17 MMOL/L (ref 1.12–1.32)
CALCIUM SERPL-MCNC: 6 MG/DL (ref 8.8–10.2)
CALCIUM SERPL-MCNC: 6.4 MG/DL (ref 8.8–10.2)
CANCER AG19-9 SERPL-ACNC: 159 U/ML (ref 0–35)
CHLORIDE SERPL-SCNC: 94 MMOL/L (ref 98–107)
CHLORIDE SERPL-SCNC: 95 MMOL/L (ref 98–107)
CO2 BLD-SCNC: 21 MMOL/L (ref 13–23)
CO2 BLD-SCNC: 22 MMOL/L (ref 13–23)
CO2 SERPL-SCNC: 22 MMOL/L (ref 20–28)
CO2 SERPL-SCNC: 22 MMOL/L (ref 20–28)
CREAT SERPL-MCNC: 3.47 MG/DL (ref 0.8–1.3)
CREAT SERPL-MCNC: 4.06 MG/DL (ref 0.8–1.3)
D DIMER PPP FEU-MCNC: 2.51 UG/ML(FEU)
DIFFERENTIAL METHOD BLD: ABNORMAL
EOSINOPHIL # BLD: 0.9 K/UL (ref 0–0.8)
EOSINOPHIL NFR BLD: 4 % (ref 0.5–7.8)
ERYTHROCYTE [DISTWIDTH] IN BLOOD BY AUTOMATED COUNT: 19.6 % (ref 11.9–14.6)
ERYTHROCYTE [DISTWIDTH] IN BLOOD BY AUTOMATED COUNT: 19.7 % (ref 11.9–14.6)
FIBRINOGEN PPP-MCNC: 90 MG/DL (ref 190–501)
FIO2 ON VENT: 100 %
FIO2 ON VENT: 80 %
GAS FLOW.O2 O2 DELIVERY SYS: ABNORMAL
GAS FLOW.O2 O2 DELIVERY SYS: ABNORMAL
GLOBULIN SER CALC-MCNC: 1.6 G/DL (ref 2.3–3.5)
GLOBULIN SER CALC-MCNC: 1.7 G/DL (ref 2.3–3.5)
GLUCOSE BLD STRIP.AUTO-MCNC: 126 MG/DL (ref 65–100)
GLUCOSE BLD STRIP.AUTO-MCNC: 155 MG/DL (ref 65–100)
GLUCOSE BLD STRIP.AUTO-MCNC: 173 MG/DL (ref 65–100)
GLUCOSE BLD STRIP.AUTO-MCNC: 236 MG/DL (ref 65–100)
GLUCOSE BLD STRIP.AUTO-MCNC: 62 MG/DL (ref 65–100)
GLUCOSE BLD STRIP.AUTO-MCNC: 65 MG/DL (ref 65–100)
GLUCOSE BLD STRIP.AUTO-MCNC: 73 MG/DL (ref 65–100)
GLUCOSE SERPL-MCNC: 155 MG/DL (ref 70–99)
GLUCOSE SERPL-MCNC: 74 MG/DL (ref 70–99)
HCO3 BLD-SCNC: 20.8 MMOL/L (ref 22–26)
HCO3 BLD-SCNC: 21.4 MMOL/L (ref 22–26)
HCT VFR BLD AUTO: 32.9 % (ref 41.1–50.3)
HCT VFR BLD AUTO: 35.1 % (ref 41.1–50.3)
HGB BLD-MCNC: 10.4 G/DL (ref 13.6–17.2)
HGB BLD-MCNC: 11.1 G/DL (ref 13.6–17.2)
IMM GRANULOCYTES # BLD AUTO: 0.5 K/UL (ref 0–0.5)
IMM GRANULOCYTES NFR BLD AUTO: 2 % (ref 0–5)
INR PPP: 1.9
INR PPP: 2.4
INSPIRATION.DURATION SETTING TIME VENT: 0.9 SEC
IPAP/PIP: 26
LACTATE SERPL-SCNC: 8.9 MMOL/L (ref 0.5–2)
LYMPHOCYTES # BLD: 3 K/UL (ref 0.5–4.6)
LYMPHOCYTES NFR BLD: 13 % (ref 13–44)
MAGNESIUM SERPL-MCNC: 1.8 MG/DL (ref 1.8–2.4)
MAGNESIUM SERPL-MCNC: 3.1 MG/DL (ref 1.8–2.4)
MCH RBC QN AUTO: 22.1 PG (ref 26.1–32.9)
MCH RBC QN AUTO: 22.7 PG (ref 26.1–32.9)
MCHC RBC AUTO-ENTMCNC: 31.6 G/DL (ref 31.4–35)
MCHC RBC AUTO-ENTMCNC: 31.6 G/DL (ref 31.4–35)
MCV RBC AUTO: 69.9 FL (ref 82–102)
MCV RBC AUTO: 71.7 FL (ref 82–102)
MONOCYTES # BLD: 1.4 K/UL (ref 0.1–1.3)
MONOCYTES NFR BLD: 6 % (ref 4–12)
NEUTS SEG # BLD: 17.2 K/UL (ref 1.7–8.2)
NEUTS SEG NFR BLD: 75 % (ref 43–78)
NRBC # BLD: 0.05 K/UL (ref 0–0.2)
NRBC # BLD: 0.2 K/UL (ref 0–0.2)
PCO2 BLD: 44 MMHG (ref 35–45)
PCO2 BLD: 67.7 MMHG (ref 35–45)
PEEP RESPIRATORY: 8
PH BLD: 7.1 (ref 7.35–7.45)
PH BLD: 7.29 (ref 7.35–7.45)
PHOSPHATE SERPL-MCNC: 5.2 MG/DL (ref 2.5–4.5)
PHOSPHATE SERPL-MCNC: 6.8 MG/DL (ref 2.5–4.5)
PLATELET # BLD AUTO: 47 K/UL (ref 150–450)
PLATELET # BLD AUTO: 51 K/UL (ref 150–450)
PLATELET COMMENT: ABNORMAL
PMV BLD AUTO: ABNORMAL FL (ref 9.4–12.3)
PMV BLD AUTO: ABNORMAL FL (ref 9.4–12.3)
PO2 BLD: 59 MMHG (ref 75–100)
PO2 BLD: 61 MMHG (ref 75–100)
POTASSIUM BLD-SCNC: 4.4 MMOL/L (ref 3.5–5.1)
POTASSIUM BLD-SCNC: 4.7 MMOL/L (ref 3.5–5.1)
POTASSIUM SERPL-SCNC: 4.1 MMOL/L (ref 3.5–5.1)
POTASSIUM SERPL-SCNC: 4.9 MMOL/L (ref 3.5–5.1)
PROT SERPL-MCNC: 3.2 G/DL (ref 6.3–8.2)
PROT SERPL-MCNC: 3.6 G/DL (ref 6.3–8.2)
PROTHROMBIN TIME: 22.9 SEC (ref 11.3–14.9)
PROTHROMBIN TIME: 27.1 SEC (ref 11.3–14.9)
RBC # BLD AUTO: 4.59 M/UL (ref 4.23–5.6)
RBC # BLD AUTO: 5.02 M/UL (ref 4.23–5.6)
RBC MORPH BLD: ABNORMAL
RESPIRATORY RATE, POC: 24 (ref 5–40)
RESPIRATORY RATE, POC: 26 (ref 5–40)
RESPIRATORY RATE: 20
SAO2 % BLD: 79 %
SAO2 % BLD: 88 %
SERVICE CMNT-IMP: ABNORMAL
SERVICE CMNT-IMP: NORMAL
SERVICE CMNT-IMP: NORMAL
SODIUM BLD-SCNC: 128 MMOL/L (ref 136–145)
SODIUM BLD-SCNC: 133 MMOL/L (ref 136–145)
SODIUM SERPL-SCNC: 133 MMOL/L (ref 136–145)
SPECIMEN SITE: ABNORMAL
SPECIMEN SITE: ABNORMAL
VENTILATION MODE VENT: ABNORMAL
VT SETTING VENT: 450
WBC # BLD AUTO: 23 K/UL (ref 4.3–11.1)
WBC # BLD AUTO: 23.5 K/UL (ref 4.3–11.1)
WBC MORPH BLD: ABNORMAL

## 2024-06-10 PROCEDURE — 2500000003 HC RX 250 WO HCPCS: Performed by: INTERNAL MEDICINE

## 2024-06-10 PROCEDURE — 94002 VENT MGMT INPAT INIT DAY: CPT

## 2024-06-10 PROCEDURE — 84132 ASSAY OF SERUM POTASSIUM: CPT

## 2024-06-10 PROCEDURE — 6360000002 HC RX W HCPCS: Performed by: INTERNAL MEDICINE

## 2024-06-10 PROCEDURE — 6360000002 HC RX W HCPCS: Performed by: NURSE ANESTHETIST, CERTIFIED REGISTERED

## 2024-06-10 PROCEDURE — 82330 ASSAY OF CALCIUM: CPT

## 2024-06-10 PROCEDURE — 31500 INSERT EMERGENCY AIRWAY: CPT | Performed by: EMERGENCY MEDICINE

## 2024-06-10 PROCEDURE — 85025 COMPLETE CBC W/AUTO DIFF WBC: CPT

## 2024-06-10 PROCEDURE — 2580000003 HC RX 258: Performed by: INTERNAL MEDICINE

## 2024-06-10 PROCEDURE — 82947 ASSAY GLUCOSE BLOOD QUANT: CPT

## 2024-06-10 PROCEDURE — 6360000002 HC RX W HCPCS: Performed by: EMERGENCY MEDICINE

## 2024-06-10 PROCEDURE — 03HY32Z INSERTION OF MONITORING DEVICE INTO UPPER ARTERY, PERCUTANEOUS APPROACH: ICD-10-PCS | Performed by: EMERGENCY MEDICINE

## 2024-06-10 PROCEDURE — 85730 THROMBOPLASTIN TIME PARTIAL: CPT

## 2024-06-10 PROCEDURE — 2500000003 HC RX 250 WO HCPCS: Performed by: EMERGENCY MEDICINE

## 2024-06-10 PROCEDURE — 3700000001 HC ADD 15 MINUTES (ANESTHESIA): Performed by: INTERNAL MEDICINE

## 2024-06-10 PROCEDURE — 92950 HEART/LUNG RESUSCITATION CPR: CPT

## 2024-06-10 PROCEDURE — 2709999900 HC NON-CHARGEABLE SUPPLY: Performed by: INTERNAL MEDICINE

## 2024-06-10 PROCEDURE — 71045 X-RAY EXAM CHEST 1 VIEW: CPT

## 2024-06-10 PROCEDURE — 92950 HEART/LUNG RESUSCITATION CPR: CPT | Performed by: EMERGENCY MEDICINE

## 2024-06-10 PROCEDURE — C1753 CATH, INTRAVAS ULTRASOUND: HCPCS | Performed by: INTERNAL MEDICINE

## 2024-06-10 PROCEDURE — 80053 COMPREHEN METABOLIC PANEL: CPT

## 2024-06-10 PROCEDURE — 3609015100 HC ERCP STENT PLACEMENT BILIARY/PANCREATIC DUCT: Performed by: INTERNAL MEDICINE

## 2024-06-10 PROCEDURE — 83605 ASSAY OF LACTIC ACID: CPT

## 2024-06-10 PROCEDURE — 82962 GLUCOSE BLOOD TEST: CPT

## 2024-06-10 PROCEDURE — 84295 ASSAY OF SERUM SODIUM: CPT

## 2024-06-10 PROCEDURE — BF47ZZZ ULTRASONOGRAPHY OF PANCREAS: ICD-10-PCS | Performed by: INTERNAL MEDICINE

## 2024-06-10 PROCEDURE — 2580000003 HC RX 258: Performed by: NURSE ANESTHETIST, CERTIFIED REGISTERED

## 2024-06-10 PROCEDURE — 6360000002 HC RX W HCPCS: Performed by: FAMILY MEDICINE

## 2024-06-10 PROCEDURE — 3700000000 HC ANESTHESIA ATTENDED CARE: Performed by: INTERNAL MEDICINE

## 2024-06-10 PROCEDURE — 85610 PROTHROMBIN TIME: CPT

## 2024-06-10 PROCEDURE — 2580000003 HC RX 258: Performed by: EMERGENCY MEDICINE

## 2024-06-10 PROCEDURE — 85379 FIBRIN DEGRADATION QUANT: CPT

## 2024-06-10 PROCEDURE — 2500000003 HC RX 250 WO HCPCS: Performed by: NURSE ANESTHETIST, CERTIFIED REGISTERED

## 2024-06-10 PROCEDURE — 83735 ASSAY OF MAGNESIUM: CPT

## 2024-06-10 PROCEDURE — 5A1935Z RESPIRATORY VENTILATION, LESS THAN 24 CONSECUTIVE HOURS: ICD-10-PCS | Performed by: INTERNAL MEDICINE

## 2024-06-10 PROCEDURE — 2700000000 HC OXYGEN THERAPY PER DAY

## 2024-06-10 PROCEDURE — 43259 EGD US EXAM DUODENUM/JEJUNUM: CPT | Performed by: INTERNAL MEDICINE

## 2024-06-10 PROCEDURE — 94640 AIRWAY INHALATION TREATMENT: CPT

## 2024-06-10 PROCEDURE — C1748 ENDOSCOPE, SINGLE, UGI: HCPCS | Performed by: INTERNAL MEDICINE

## 2024-06-10 PROCEDURE — 6370000000 HC RX 637 (ALT 250 FOR IP): Performed by: INTERNAL MEDICINE

## 2024-06-10 PROCEDURE — 2000000000 HC ICU R&B

## 2024-06-10 PROCEDURE — 0DJ08ZZ INSPECTION OF UPPER INTESTINAL TRACT, VIA NATURAL OR ARTIFICIAL OPENING ENDOSCOPIC: ICD-10-PCS | Performed by: INTERNAL MEDICINE

## 2024-06-10 PROCEDURE — 3609018500 HC EGD US SCOPE W/ADJACENT STRUCTURES: Performed by: INTERNAL MEDICINE

## 2024-06-10 PROCEDURE — 99291 CRITICAL CARE FIRST HOUR: CPT | Performed by: INTERNAL MEDICINE

## 2024-06-10 PROCEDURE — 80076 HEPATIC FUNCTION PANEL: CPT

## 2024-06-10 PROCEDURE — C1769 GUIDE WIRE: HCPCS | Performed by: INTERNAL MEDICINE

## 2024-06-10 PROCEDURE — 99233 SBSQ HOSP IP/OBS HIGH 50: CPT | Performed by: PHYSICIAN ASSISTANT

## 2024-06-10 PROCEDURE — 5A12012 PERFORMANCE OF CARDIAC OUTPUT, SINGLE, MANUAL: ICD-10-PCS | Performed by: EMERGENCY MEDICINE

## 2024-06-10 PROCEDURE — BF43ZZZ ULTRASONOGRAPHY OF GALLBLADDER AND BILE DUCTS: ICD-10-PCS | Performed by: INTERNAL MEDICINE

## 2024-06-10 PROCEDURE — 82803 BLOOD GASES ANY COMBINATION: CPT

## 2024-06-10 PROCEDURE — 85384 FIBRINOGEN ACTIVITY: CPT

## 2024-06-10 PROCEDURE — 84100 ASSAY OF PHOSPHORUS: CPT

## 2024-06-10 PROCEDURE — 0BH17EZ INSERTION OF ENDOTRACHEAL AIRWAY INTO TRACHEA, VIA NATURAL OR ARTIFICIAL OPENING: ICD-10-PCS | Performed by: INTERNAL MEDICINE

## 2024-06-10 PROCEDURE — 85027 COMPLETE CBC AUTOMATED: CPT

## 2024-06-10 PROCEDURE — 5A1D70Z PERFORMANCE OF URINARY FILTRATION, INTERMITTENT, LESS THAN 6 HOURS PER DAY: ICD-10-PCS | Performed by: INTERNAL MEDICINE

## 2024-06-10 RX ORDER — ONDANSETRON 2 MG/ML
INJECTION INTRAMUSCULAR; INTRAVENOUS PRN
Status: DISCONTINUED | OUTPATIENT
Start: 2024-06-10 | End: 2024-06-10 | Stop reason: SDUPTHER

## 2024-06-10 RX ORDER — LIDOCAINE HYDROCHLORIDE 20 MG/ML
INJECTION, SOLUTION EPIDURAL; INFILTRATION; INTRACAUDAL; PERINEURAL PRN
Status: DISCONTINUED | OUTPATIENT
Start: 2024-06-10 | End: 2024-06-10 | Stop reason: SDUPTHER

## 2024-06-10 RX ORDER — PHENYLEPHRINE HYDROCHLORIDE 10 MG/ML
INJECTION INTRAVENOUS
Status: DISCONTINUED
Start: 2024-06-10 | End: 2024-06-11 | Stop reason: HOSPADM

## 2024-06-10 RX ORDER — PROPOFOL 10 MG/ML
INJECTION, EMULSION INTRAVENOUS PRN
Status: DISCONTINUED | OUTPATIENT
Start: 2024-06-10 | End: 2024-06-10 | Stop reason: SDUPTHER

## 2024-06-10 RX ORDER — NOREPINEPHRINE BITARTRATE 1 MG/ML
INJECTION, SOLUTION INTRAVENOUS PRN
Status: DISCONTINUED | OUTPATIENT
Start: 2024-06-10 | End: 2024-06-10 | Stop reason: SDUPTHER

## 2024-06-10 RX ORDER — ROCURONIUM BROMIDE 10 MG/ML
INJECTION, SOLUTION INTRAVENOUS PRN
Status: DISCONTINUED | OUTPATIENT
Start: 2024-06-10 | End: 2024-06-10 | Stop reason: SDUPTHER

## 2024-06-10 RX ORDER — HEPARIN SODIUM 1000 [USP'U]/ML
1000 INJECTION, SOLUTION INTRAVENOUS; SUBCUTANEOUS
Status: ACTIVE | OUTPATIENT
Start: 2024-06-10 | End: 2024-06-11

## 2024-06-10 RX ORDER — SUCCINYLCHOLINE CHLORIDE 20 MG/ML
INJECTION INTRAMUSCULAR; INTRAVENOUS PRN
Status: DISCONTINUED | OUTPATIENT
Start: 2024-06-10 | End: 2024-06-10 | Stop reason: SDUPTHER

## 2024-06-10 RX ORDER — FENTANYL CITRATE-0.9 % NACL/PF 10 MCG/ML
25-200 PLASTIC BAG, INJECTION (ML) INTRAVENOUS CONTINUOUS
Status: DISCONTINUED | OUTPATIENT
Start: 2024-06-10 | End: 2024-06-11 | Stop reason: HOSPADM

## 2024-06-10 RX ORDER — VASOPRESSIN 20 U/ML
INJECTION PARENTERAL PRN
Status: DISCONTINUED | OUTPATIENT
Start: 2024-06-10 | End: 2024-06-10 | Stop reason: SDUPTHER

## 2024-06-10 RX ORDER — ETOMIDATE 2 MG/ML
INJECTION INTRAVENOUS PRN
Status: DISCONTINUED | OUTPATIENT
Start: 2024-06-10 | End: 2024-06-10 | Stop reason: SDUPTHER

## 2024-06-10 RX ORDER — SODIUM CHLORIDE, SODIUM LACTATE, POTASSIUM CHLORIDE, CALCIUM CHLORIDE 600; 310; 30; 20 MG/100ML; MG/100ML; MG/100ML; MG/100ML
INJECTION, SOLUTION INTRAVENOUS CONTINUOUS PRN
Status: COMPLETED | OUTPATIENT
Start: 2024-06-10 | End: 2024-06-10

## 2024-06-10 RX ADMIN — PROPOFOL 30 MG: 10 INJECTION, EMULSION INTRAVENOUS at 11:15

## 2024-06-10 RX ADMIN — ALBUTEROL SULFATE 2.5 MG: 2.5 SOLUTION RESPIRATORY (INHALATION) at 22:38

## 2024-06-10 RX ADMIN — VASOPRESSIN 1 UNITS: 20 INJECTION INTRAVENOUS at 11:21

## 2024-06-10 RX ADMIN — CALCIUM CHLORIDE 1000 MG: 100 INJECTION INTRAVENOUS; INTRAVENTRICULAR at 22:53

## 2024-06-10 RX ADMIN — BUDESONIDE AND FORMOTEROL FUMARATE DIHYDRATE 2 PUFF: 160; 4.5 AEROSOL RESPIRATORY (INHALATION) at 08:34

## 2024-06-10 RX ADMIN — EPINEPHRINE 1 MG: 0.1 INJECTION INTRACARDIAC; INTRAVENOUS at 21:27

## 2024-06-10 RX ADMIN — SODIUM BICARBONATE 50 MEQ: 84 INJECTION, SOLUTION INTRAVENOUS at 21:28

## 2024-06-10 RX ADMIN — FOLIC ACID 1000 MCG: 1 TABLET ORAL at 07:32

## 2024-06-10 RX ADMIN — Medication 120 MG: at 11:15

## 2024-06-10 RX ADMIN — SODIUM CHLORIDE, SODIUM LACTATE, POTASSIUM CHLORIDE, AND CALCIUM CHLORIDE: 600; 310; 30; 20 INJECTION, SOLUTION INTRAVENOUS at 11:41

## 2024-06-10 RX ADMIN — MEROPENEM 500 MG: 500 INJECTION, POWDER, FOR SOLUTION INTRAVENOUS at 20:50

## 2024-06-10 RX ADMIN — NOREPINEPHRINE BITARTRATE 16 MCG: 1 SOLUTION INTRAVENOUS at 11:57

## 2024-06-10 RX ADMIN — PHENYLEPHRINE HYDROCHLORIDE 50 MCG/MIN: 10 INJECTION INTRAVENOUS at 23:56

## 2024-06-10 RX ADMIN — SODIUM CHLORIDE 5 MCG/MIN: 9 INJECTION, SOLUTION INTRAVENOUS at 20:38

## 2024-06-10 RX ADMIN — LEVOTHYROXINE SODIUM 100 MCG: 0.1 TABLET ORAL at 07:32

## 2024-06-10 RX ADMIN — NOREPINEPHRINE BITARTRATE 16 MCG: 1 SOLUTION INTRAVENOUS at 11:21

## 2024-06-10 RX ADMIN — ETOMIDATE 10 MG: 2 INJECTION, SOLUTION INTRAVENOUS at 11:15

## 2024-06-10 RX ADMIN — ALBUTEROL SULFATE 2.5 MG: 2.5 SOLUTION RESPIRATORY (INHALATION) at 08:34

## 2024-06-10 RX ADMIN — FENTANYL CITRATE 50 MCG/HR: 50 INJECTION, SOLUTION INTRAMUSCULAR; INTRAVENOUS at 22:37

## 2024-06-10 RX ADMIN — EPINEPHRINE 5 MCG/MIN: 1 INJECTION INTRAMUSCULAR; INTRAVENOUS; SUBCUTANEOUS at 22:24

## 2024-06-10 RX ADMIN — PANTOPRAZOLE SODIUM 40 MG: 40 TABLET, DELAYED RELEASE ORAL at 07:32

## 2024-06-10 RX ADMIN — SODIUM BICARBONATE 50 MEQ: 84 INJECTION, SOLUTION INTRAVENOUS at 21:33

## 2024-06-10 RX ADMIN — EPINEPHRINE 1 MG: 0.1 INJECTION INTRACARDIAC; INTRAVENOUS at 21:29

## 2024-06-10 RX ADMIN — SODIUM CHLORIDE 3 MCG/MIN: 9 INJECTION, SOLUTION INTRAVENOUS at 04:32

## 2024-06-10 RX ADMIN — ROCURONIUM BROMIDE 5 MG: 10 INJECTION, SOLUTION INTRAVENOUS at 11:15

## 2024-06-10 RX ADMIN — PREGABALIN 25 MG: 25 CAPSULE ORAL at 07:32

## 2024-06-10 RX ADMIN — VASOPRESSIN 0.03 UNITS/MIN: 20 INJECTION PARENTERAL at 11:13

## 2024-06-10 RX ADMIN — EPINEPHRINE 1 MG: 0.1 INJECTION INTRACARDIAC; INTRAVENOUS at 21:37

## 2024-06-10 RX ADMIN — SODIUM CHLORIDE 125 MG: 9 INJECTION, SOLUTION INTRAVENOUS at 08:42

## 2024-06-10 RX ADMIN — EPINEPHRINE 1 MG: 0.1 INJECTION INTRACARDIAC; INTRAVENOUS at 22:51

## 2024-06-10 RX ADMIN — PHENYLEPHRINE HYDROCHLORIDE 100 MCG: 10 INJECTION INTRAVENOUS at 11:15

## 2024-06-10 RX ADMIN — SODIUM BICARBONATE 50 MEQ: 84 INJECTION, SOLUTION INTRAVENOUS at 22:52

## 2024-06-10 RX ADMIN — ONDANSETRON 4 MG: 2 INJECTION INTRAMUSCULAR; INTRAVENOUS at 12:06

## 2024-06-10 RX ADMIN — MAGNESIUM SULFATE HEPTAHYDRATE 4000 MG: 40 INJECTION, SOLUTION INTRAVENOUS at 21:31

## 2024-06-10 RX ADMIN — SODIUM CHLORIDE 50 MCG/MIN: 9 INJECTION, SOLUTION INTRAVENOUS at 23:00

## 2024-06-10 RX ADMIN — DEXTROSE MONOHYDRATE 125 ML: 10 INJECTION, SOLUTION INTRAVENOUS at 17:34

## 2024-06-10 RX ADMIN — LIDOCAINE HYDROCHLORIDE 100 MG: 20 INJECTION, SOLUTION EPIDURAL; INFILTRATION; INTRACAUDAL; PERINEURAL at 11:15

## 2024-06-10 ASSESSMENT — PULMONARY FUNCTION TESTS
PIF_VALUE: 18
PIF_VALUE: 33
PIF_VALUE: 18

## 2024-06-10 ASSESSMENT — PAIN SCALES - GENERAL
PAINLEVEL_OUTOF10: 0

## 2024-06-10 ASSESSMENT — LIFESTYLE VARIABLES: SMOKING_STATUS: 1

## 2024-06-10 ASSESSMENT — PAIN - FUNCTIONAL ASSESSMENT: PAIN_FUNCTIONAL_ASSESSMENT: NONE - DENIES PAIN

## 2024-06-10 ASSESSMENT — ENCOUNTER SYMPTOMS: SHORTNESS OF BREATH: 1

## 2024-06-10 NOTE — PROGRESS NOTES
Hospitalist Progress Note   Admit Date:  2024  5:04 PM   Name:  Chris Anderson   Age:  73 y.o.  Sex:  male  :  1950   MRN:  [id number]   Room:  [facility]/    Presenting/Chief Complaint: Shortness of Breath and Leg Swelling     Reason(s) for Admission: Dehydration [E86.0]  Hyperkalemia [E87.5]  Acute kidney injury (HCC) [N17.9]  Immunosuppressed status (HCC) [D84.9]  Sepsis secondary to UTI (HCC) [A41.9, N39.0]  Elevated lactic acid level [R79.89]     Hospital Course:   Chris Anderson is a 73 y.o. male with medical history of renal transplant 2016 [facility] on CellCept/tacrolimus/prednisone, CAD s/p CABG, A-fib s/p Watchman and pacemaker in place, COPD who presented with dyspnea from nephrology clinic.  He had been recently admitted to [facility]  -  for DKA associated with ESDRAS.  He had a follow-up with nephrology on  and was advised to come to the ED for dyspnea.  Patient admits to dysuria with diffuse abdominal pain and emesisx1 episode.  He still makes some urine.  Denies fever.    In the ED, CXR unremarkable.  Creatinine up to 1.8, bicarb 1 4, sodium 121, potassium 6.1 s/p treatment in the ED came down to 4.9.  Patient met sepsis criteria admission due to lactic acidosis 3.9 and leukocytosis 14.7 K.  UA concerning for UTI.  Patient received Vanco/cefepime in ED.    Patient was admitted with severe sepsis secondary to UTI associated with ESDRAS and CKD stage III associated with metabolic acidosis/lactic acidosis, hyperkalemia, dyspnea.  History of kidney transplant.  Nephrology and pulmonology were consulted.  Patient was started empirically on Rocephin for UTI.  Blood cultures NGTD thus far.  Prompt treatment for hyperkalemia initiated.  Patient was started on bicarb drip.    Subjective & 24hr Events:     Saw patient earlier this morning.  He reported ongoing abdominal discomfort as well as ongoing discomfort from difficulty positioning his legs in the bed.    Assessment & Plan:      Severe sepsis, unclear source   Leukocytosis 14.7, RR >20 lactic acid elevated to 4.1. Notably immunocompromised.   ABx: Rocephin (6/4-6/6) Vancomycin and Zosyn (6/6...)  Blood cultures: NGTD  Urine culture without growth  Possible abdominal source / cholangitis  Pulmonology on board  Levophed, titrate to MAP greater than 65 mmHg    Possible CBD obstruction/cholangitis  Noted on Abd US 6/6 with cholelithiasis, GB wall thickening, positive sonographic Kevin sign  HIDA scan showed no evidence of cholecystitis, but did show concern for CBD obstruction  Emergent ERCP today  Monitor labs, continue antibiotics as above    Possible liver mass  US showed cirrhotic liver and possible mass in right hepatic lobe, as well as 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  Appreciate GI consultation  Unfortunately patient unable to tolerate MRI due to pacemaker  Anticipate biopsy in the future  Hepatitis panel non-reactive  Tumor markers WNL    Hyperkalemia   S/p calcium gluconate, DuoNebs, Lasix, albuterol, insulin or 10 units/dextrose, Lokelma in the ED.  Resolved  Monitor on telemetry  Continue albuterol nebs  CTM BMP closely    Metabolic acidosis  Lactic acidosis  Most likely d/t sepsis and acute renal failure with liver disease contributory  Repeat level pending    ESDRAS on CKD 3  Kidney transplant recipient  Creatinine on admission 1.80 (baseline ~1.2-1.5)  S/p renal transplant in 2016 at [facility]  Nephrology on board, appreciate recs  Received burst IV steroids per Nephrology  Holding home Cellcept  Prograf level pending  CTM BMP  Dialysis catheter placed  Will need intermittent dialysis this admission    Dyspnea  Unclear etiology. CXR shows no acute findings.  ABG consistent with metabolic acidosis with respiratory compensation, which is most likely contributing to patient's dyspnea  MIGUELANGEL on 4/19/2024 with EF 55-65%  Cont home inhalers-scheduled nebs  Cont Mucinex  Added IS, encourage use  VRP/COVID-19 negative  Pulm on board,

## 2024-06-10 NOTE — PLAN OF CARE
Problem: Safety - Adult  Goal: Free from fall injury  6/10/2024 0858 by Vero Amaral RN  Outcome: Progressing  6/10/2024 0420 by Del Lewis RN  Outcome: Progressing     Problem: Discharge Planning  Goal: Discharge to home or other facility with appropriate resources  6/10/2024 0858 by Vero Amaral RN  Outcome: Progressing  6/10/2024 0420 by Del Lewis RN  Outcome: Progressing  Flowsheets (Taken 6/9/2024 1917)  Discharge to home or other facility with appropriate resources:   Identify barriers to discharge with patient and caregiver   Arrange for needed discharge resources and transportation as appropriate   Identify discharge learning needs (meds, wound care, etc)   Refer to discharge planning if patient needs post-hospital services based on physician order or complex needs related to functional status, cognitive ability or social support system     Problem: Pain  Goal: Verbalizes/displays adequate comfort level or baseline comfort level  6/10/2024 0858 by Vero Amaral RN  Outcome: Progressing  6/10/2024 0420 by Del Lewis RN  Outcome: Progressing  Flowsheets (Taken 6/9/2024 1917)  Verbalizes/displays adequate comfort level or baseline comfort level:   Encourage patient to monitor pain and request assistance   Assess pain using appropriate pain scale   Administer analgesics based on type and severity of pain and evaluate response     Problem: Chronic Conditions and Co-morbidities  Goal: Patient's chronic conditions and co-morbidity symptoms are monitored and maintained or improved  6/10/2024 0858 by Vero Amaral RN  Outcome: Progressing  6/10/2024 0420 by Del Lewis RN  Outcome: Progressing  Flowsheets (Taken 6/9/2024 1917)  Care Plan - Patient's Chronic Conditions and Co-Morbidity Symptoms are Monitored and Maintained or Improved:   Monitor and assess patient's chronic conditions and comorbid symptoms for stability, deterioration, or improvement   Collaborate with multidisciplinary

## 2024-06-10 NOTE — PROGRESS NOTES
Physical Therapy Note:    Attempted to see patient this AM for physical therapy treatment  session.  Treatment deferred as patient going off the floor for ERCP. Will follow and re-attempt as schedule permits/patient available. Thank you,    Guadalupe Mercado, PT     Rehab Caseload Tracker

## 2024-06-10 NOTE — OP NOTE
Procedure: EGD, Endoscopic Ultrasound (EUS)    Indication: Duodenal edema, with difficulty in identification of ampulla    Date of Procedure: 6/10/2024    Patient profile: Refer to patient note in chart for documentation of history and physical    Providers: Luis Rider MD    Referring MD: No ref. provider found    PCP: Ata Melo MD    Medicines: General anesthesia    Complication: No immediate complications.     Estimated blood loss: Minimal    Procedure: After the risks including, but not limited to medication reaction, infection, bleeding, perforation, missed lesions, benefits and alternatives of the procedure were discussed with the patient and all questions were answered, informed consent was obtained. The gastroscope and the linear echoendoscope were passed under direct vision throughout the procedure, the patient's blood pressure pulse and oxygen saturation were monitored continuously. The scopes were introduced through the mouth and advanced to the second part of duodenum. The endoscopy was performed without difficulty. The patient tolerated the procedure well.       Findings:   Endoscopic Exam  --The adult gastroscope was introduced from the mouth and advanced through the esophagus to the GE junction.   --Scope advanced to the stomach. The stomach was normal on forward and retroflexed views.   --The scope was then advanced to the duodenum, appearing normal to the second portion.   --The EGD scope was removed, and the linear EUS scope was introduced.     EUS Exam  Fuji Arietta Precision Ultrasound System was utilized for EUS imaging.     The linear echoendoscope (Olympus 180) was introduced from the mouth and advanced through the esophagus into the stomach to the level of the celiac axis.  The celiac axis was normal. There was no evidence of clinically significant celiac lymphadenopathy. The scope was then torqued counterclockwise to identify the pancreatic parenchyma. The pancreatic body and tail  were normal. The pancreatic duct was identified. The PD measured 2 mm in the neck, 1 mm in the body and 1 mm in the tail.     The scope was then introduced further toward the gastric antrum. The gallbladder was identified. Gallbladder sludge and stones noted. Gallbladder is not dilated.   The scope was then introduced into the duodenal bulb, where the common bile duct was identified. The CBD measured 3-4 mm. No stones/sludge noted in the CBD. The portal vein was idenitified. The portal confluence was normal.   There is ascites noted as well. No evidence of intrahepatic ductal dilation noted either.   The scope was advanced to the deep view, and the uncinate process was identified. This was normal.     The scope was pulled back, and the procedure was subsequently ended.    Impression:  ---No evidence of CBD stones. No intrahepatic biliary ductal dilation noted either.  --    Recommendations:  --Transfer to ICU  --Consider other etiology of patients' septic shock. No obvious biliary etiology noted  --given ascites, consider paracentesis to rule out SBP.      Luis Rider MD  Gastroenterology and Interventional Endoscopy

## 2024-06-10 NOTE — PROGRESS NOTES
Subjective:   Daily Progress Note: 6/10/2024 3:07 PM    Seen on ICU. Awake and alert, uncomfortable      No CP + SOB  No Abd pain  MS -      Current Facility-Administered Medications   Medication Dose Route Frequency    VASOpressin (VASOSTRICT) 20 units in dextrose 5% 100 mL infusion  0.01-0.03 Units/min IntraVENous Continuous    sodium chloride 0.9 % bolus 100 mL  100 mL IntraVENous PRN    norepinephrine (LEVOPHED) 4 mg in sodium chloride 0.9 % 250 mL infusion  1-100 mcg/min IntraVENous Continuous    meropenem (MERREM) 500 mg in sodium chloride 0.9 % 100 mL IVPB (mini-bag)  500 mg IntraVENous Q24H    [Held by provider] insulin glargine (LANTUS) injection vial 15 Units  15 Units SubCUTAneous Daily    pregabalin (LYRICA) capsule 25 mg  25 mg Oral BID    traMADol (ULTRAM) tablet 50 mg  50 mg Oral Q6H PRN    diatrizoate meglumine-sodium (GASTROGRAFIN) 66-10 % solution 15 mL  15 mL Oral ONCE PRN    albuterol (PROVENTIL) (2.5 MG/3ML) 0.083% nebulizer solution 2.5 mg  2.5 mg Nebulization Q4H PRN    albuterol (PROVENTIL) (2.5 MG/3ML) 0.083% nebulizer solution 2.5 mg  2.5 mg Nebulization BID RT    [Held by provider] aspirin chewable tablet 81 mg  81 mg Oral Daily    [Held by provider] atorvastatin (LIPITOR) tablet 40 mg  40 mg Oral Nightly    budesonide-formoterol (SYMBICORT) 160-4.5 MCG/ACT inhaler 2 puff  2 puff Inhalation BID RT    [Held by provider] clopidogrel (PLAVIX) tablet 75 mg  75 mg Oral Nightly    folic acid (FOLVITE) tablet 1,000 mcg  1,000 mcg Oral Daily    levothyroxine (SYNTHROID) tablet 100 mcg  100 mcg Oral QAM AC    [Held by provider] mycophenolate (CELLCEPT) capsule 500 mg  500 mg Oral BID    pantoprazole (PROTONIX) tablet 40 mg  40 mg Oral QAM AC    [Held by provider] tacrolimus (PROGRAF) capsule 1 mg  1 mg Oral Q12H    sodium chloride flush 0.9 % injection 5-40 mL  5-40 mL IntraVENous 2 times per day    sodium chloride flush 0.9 % injection 5-40 mL  5-40 mL IntraVENous PRN    0.9 % sodium chloride  infusion   IntraVENous PRN    ondansetron (ZOFRAN-ODT) disintegrating tablet 4 mg  4 mg Oral Q8H PRN    Or    ondansetron (ZOFRAN) injection 4 mg  4 mg IntraVENous Q6H PRN    polyethylene glycol (GLYCOLAX) packet 17 g  17 g Oral Daily PRN    acetaminophen (TYLENOL) tablet 650 mg  650 mg Oral Q6H PRN    Or    acetaminophen (TYLENOL) suppository 650 mg  650 mg Rectal Q6H PRN    glucose chewable tablet 16 g  4 tablet Oral PRN    dextrose bolus 10% 125 mL  125 mL IntraVENous PRN    Or    dextrose bolus 10% 250 mL  250 mL IntraVENous PRN    Glucagon Emergency KIT 1 mg  1 mg SubCUTAneous PRN    dextrose 10 % infusion   IntraVENous Continuous PRN    insulin lispro (HUMALOG,ADMELOG) injection vial 0-4 Units  0-4 Units SubCUTAneous TID WC    insulin lispro (HUMALOG,ADMELOG) injection vial 0-4 Units  0-4 Units SubCUTAneous Nightly         Objective:     BP (!) 99/54   Pulse 77   Temp 97.6 °F (36.4 °C) (Axillary)   Resp 21   Ht 1.854 m (6' 1\")   Wt 107.3 kg (236 lb 9.6 oz)   SpO2 94%   BMI 31.22 kg/m²         Temp (24hrs), Av.7 °F (36.5 °C), Min:97.5 °F (36.4 °C), Max:98.5 °F (36.9 °C)      06/10 07 - 06/10 1900  In: 1402.8 [I.V.:1187.3]  Out: 0   1901 - 06/10 0700  In: 1856.3 [P.O.:100; I.V.:328.7]  Out: 210 [Urine:210]      BP (!) 99/54   Pulse 77   Temp 97.6 °F (36.4 °C) (Axillary)   Resp 21   Ht 1.854 m (6' 1\")   Wt 107.3 kg (236 lb 9.6 oz)   SpO2 94%   BMI 31.22 kg/m²   Head: Normocephalic, without obvious abnormality  Lungs: decreased bilaterally  Heart: regular rate and rhythm  Abdomen: soft, +RUQ tenderness, Distended (better)  Extremities: 1-2+ edema          Data Review    Recent Results (from the past 48 hour(s))   POCT Glucose    Collection Time: 24  5:13 PM   Result Value Ref Range    POC Glucose 198 (H) 65 - 100 mg/dL    Performed by: Buck    Basic Metabolic Panel    Collection Time: 24  5:41 PM   Result Value Ref Range    Sodium 126 (L) 136 - 145 mmol/L     underlying cirrhosis, hepatic mass. W/U ongoing.     7) Profound iron deficiency - will give IV iron.     8) Acute Choleycystitis for ERCP 6/10/24    Next SLED in am

## 2024-06-10 NOTE — PLAN OF CARE
Problem: Safety - Adult  Goal: Free from fall injury  Outcome: Progressing     Problem: Discharge Planning  Goal: Discharge to home or other facility with appropriate resources  Outcome: Progressing  Flowsheets (Taken 6/9/2024 1917)  Discharge to home or other facility with appropriate resources:   Identify barriers to discharge with patient and caregiver   Arrange for needed discharge resources and transportation as appropriate   Identify discharge learning needs (meds, wound care, etc)   Refer to discharge planning if patient needs post-hospital services based on physician order or complex needs related to functional status, cognitive ability or social support system     Problem: Pain  Goal: Verbalizes/displays adequate comfort level or baseline comfort level  Outcome: Progressing  Flowsheets (Taken 6/9/2024 1917)  Verbalizes/displays adequate comfort level or baseline comfort level:   Encourage patient to monitor pain and request assistance   Assess pain using appropriate pain scale   Administer analgesics based on type and severity of pain and evaluate response     Problem: Chronic Conditions and Co-morbidities  Goal: Patient's chronic conditions and co-morbidity symptoms are monitored and maintained or improved  Outcome: Progressing  Flowsheets (Taken 6/9/2024 1917)  Care Plan - Patient's Chronic Conditions and Co-Morbidity Symptoms are Monitored and Maintained or Improved:   Monitor and assess patient's chronic conditions and comorbid symptoms for stability, deterioration, or improvement   Collaborate with multidisciplinary team to address chronic and comorbid conditions and prevent exacerbation or deterioration     Problem: Skin/Tissue Integrity  Goal: Absence of new skin breakdown  Description: 1.  Monitor for areas of redness and/or skin breakdown  2.  Assess vascular access sites hourly  3.  Every 4-6 hours minimum:  Change oxygen saturation probe site  4.  Every 4-6 hours:  If on nasal continuous  positive airway pressure, respiratory therapy assess nares and determine need for appliance change or resting period.  Outcome: Progressing

## 2024-06-10 NOTE — ANESTHESIA POSTPROCEDURE EVALUATION
Department of Anesthesiology  Postprocedure Note    Patient: Chris Anderson  MRN: [id number]  YOB: 1950  Date of evaluation: 6/10/2024    Procedure Summary       Date: 06/10/24 Room / Location:  ENDO [facility] 1 /  ENDOSCOPY    Anesthesia Start: 1107 Anesthesia Stop: 1237    Procedures:       ENDOSCOPIC RETROGRADE CHOLANGIOPANCREATOGRAPHY (Upper GI Region)      ENDOSCOPIC ULTRASOUND Diagnosis:       Cholangitis      (Cholangitis [K83.09])    Surgeons: Luis Rider MD Responsible Provider: Flo Lee MD    Anesthesia Type: general ASA Status: 4 - Emergent            Anesthesia Type: No value filed.    Martha Phase I: Martha Score: 7    Martha Phase II:      Anesthesia Post Evaluation    Patient location during evaluation: PACU  Patient participation: complete - patient participated  Level of consciousness: awake and alert  Airway patency: patent  Nausea & Vomiting: no nausea and no vomiting  Cardiovascular status: hemodynamically stable  Respiratory status: acceptable, nonlabored ventilation and spontaneous ventilation  Hydration status: euvolemic  Comments: BP (!) 99/54   Pulse 77   Temp 97.6 °F (36.4 °C) (Axillary)   Resp 21   Ht 1.854 m (6' 1\")   Wt 107.3 kg (236 lb 9.6 oz)   SpO2 94%   BMI 31.22 kg/m²     Multimodal analgesia pain management approach  Pain management: adequate and satisfactory to patient    No notable events documented.

## 2024-06-10 NOTE — ANESTHESIA PRE PROCEDURE
Department of Anesthesiology  Preprocedure Note       Name:  Chris Anderson   Age:  73 y.o.  :  1950                                          MRN:  [id number]         Date:  6/10/2024      Surgeon: Surgeon(s):  Luis Rider MD    Procedure: Procedure(s):  ENDOSCOPIC RETROGRADE CHOLANGIOPANCREATOGRAPHY STENT INSERTION    Medications prior to admission:   Prior to Admission medications    Medication Sig Start Date End Date Taking? Authorizing Provider   albuterol sulfate  (90 Base) MCG/ACT inhaler Inhale into the lungs every 4 hours as needed   Yes Automatic Reconciliation, Ar   amLODIPine (NORVASC) 10 MG tablet Take 0.5 tablets by mouth daily   Yes Automatic Reconciliation, Ar   aspirin 81 MG chewable tablet Take 1 tablet by mouth daily   Yes Automatic Reconciliation, Ar   atorvastatin (LIPITOR) 40 MG tablet Take 1 tablet by mouth   Yes Automatic Reconciliation, Ar   budesonide-formoterol (SYMBICORT) 160-4.5 MCG/ACT AERO Inhale 2 puffs into the lungs 2 times daily   Yes Automatic Reconciliation, Ar   vitamin D3 (CHOLECALCIFEROL) 125 MCG (5000 UT) TABS tablet Take 1 tablet by mouth daily   Yes Automatic Reconciliation, Ar   clopidogrel (PLAVIX) 75 MG tablet Take 1 tablet by mouth daily 19  Yes Automatic Reconciliation, Ar   folic acid (FOLVITE) 800 MCG tablet Take 1 tablet by mouth daily   Yes Automatic Reconciliation, Ar   guaiFENesin 1200 MG TB12 Take 1,200 mg by mouth as needed   Yes Automatic Reconciliation, Ar   Insulin Degludec (TRESIBA FLEXTOUCH) 200 UNIT/ML SOPN Inject 30 Units into the skin daily   Yes Automatic Reconciliation, Ar   insulin regular (HUMULIN R;NOVOLIN R) 100 UNIT/ML injection Inject 4 Units into the skin   Yes Automatic Reconciliation, Ar   levothyroxine (SYNTHROID) 100 MCG tablet Take 1 tablet by mouth every morning (before breakfast)   Yes Automatic Reconciliation, Ar   melatonin 5 MG TABS tablet Take 2 tablets by mouth   Yes Automatic Reconciliation, Ar

## 2024-06-10 NOTE — PROGRESS NOTES
Progress Note  Date:6/10/2024       Room:[address] [place] [address]  Patient Name:Chris Anderson     YOB: 1950     Age:73 y.o.        Subjective    Subjective   Tm 98.5. UOP 35 mL last 24 hours, sharma in place. No BM recorded. Was on SLED for 6 hours yesterday afternoon. WBC, lactate, LFTs including bilirubin up-trending over the weekend. On Levophed 5 mcg/min overnight. A&O x 2 this morning which is new. 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     Review of Systems   Constitutional:  Negative for fever.   All other systems reviewed and are negative.    Objective         Vitals Last 24 Hours:  TEMPERATURE:  Temp  Av.7 °F (36.5 °C)  Min: 97.5 °F (36.4 °C)  Max: 98.5 °F (36.9 °C)  RESPIRATIONS RANGE: Resp  Av.2  Min: 11  Max: 63  PULSE OXIMETRY RANGE: SpO2  Av %  Min: 69 %  Max: 100 %  PULSE RANGE: Pulse  Av.9  Min: 65  Max: 100  BLOOD PRESSURE RANGE: Systolic (24hrs), Av , Min:83 , Max:153   ; Diastolic (24hrs), Av, Min:41, Max:89    I/O (24Hr):    Intake/Output Summary (Last 24 hours) at 6/10/2024 0919  Last data filed at 6/10/2024 0527  Gross per 24 hour   Intake 1691.92 ml   Output 35 ml   Net 1656.92 ml     Objective:  General Appearance:  Comfortable, in no acute distress and ill-appearing.    Vital signs: (most recent): Blood pressure (!) 91/54, pulse 93, temperature 98.5 °F (36.9 °C), temperature source Oral, resp. rate 18, height 1.854 m (6' 1\"), weight 107.3 kg (236 lb 9.6 oz), SpO2 91 %.  Vital signs are normal.  No fever.    Output: Minimal urine output.    Lungs:  Normal effort.  He is not in respiratory distress.    Heart: Normal rate.    Abdomen: Abdomen is soft and non-distended.  There is generalized tenderness.  There is no rebound tenderness.  There is no guarding.     Neurological: Patient is alert.  (Oriented to self and year, not to place).    Skin:  Warm and dry.      Labs/Imaging/Diagnostics   PORTABLE    Result Date: 6/8/2024  Chest X-ray INDICATION: Dialysis placement COMPARISON: 8 January 2024 TECHNIQUE: AP/PA view of the chest was obtained. FINDINGS: Hemodialysis catheter on the right with tip terminating at the superior cavoatrial junction. 2-lead cardiac pacer device noted in place. Diffuse increased interstitial markings with prominence of central pulmonary vasculature. There are no infiltrates or effusions.  The heart size is normal. The bony thorax is intact.  Midline sternotomy wires.     1. Diffuse increased interstitial markings with prominence of the central pulmonary vasculature. 2. Interval placement of right-sided hemodialysis catheter with tip terminating at the superior cavoatrial junction. Electronically signed by Neda Linder    XR CHEST PORTABLE    Result Date: 6/8/2024  Chest X-ray INDICATION: Hypoxia COMPARISON: 7 June 2024 TECHNIQUE: AP view of the chest was obtained.     Findings/impression: Low lung volumes accentuate the bronchovascular markings. Likely superimposed mildly prominent perihilar opacities and central pulmonary vasculature particularly on the right may reflect edema. Median sternotomy changes. No acute finding of the cardiomediastinal silhouette. Cardiac pacing device noted. Electronically signed by Chris Rahman    Assessment//Plan           Hospital Problems             Last Modified POA    * (Principal) Hyperkalemia 6/4/2024 Yes    PAD (peripheral artery disease) ([facility]) 6/4/2024 Yes    CAD (coronary artery disease) 6/4/2024 Yes    H/O coronary artery bypass surgery 6/4/2024 Yes    Overview Signed 3/19/2022 12:50 PM by Julio, Convprob     2015 Dr. Dieudonne RINCON           DM (diabetes mellitus) ([facility]) 6/4/2024 Yes    A-fib ([facility]) 6/4/2024 Yes    Kidney transplant recipient 6/4/2024 Yes    Overview Signed 3/19/2022  4:44 PM by Julio, Convprob1     11/2016 Dr. Ana Luisa FAJARDO           COPD (chronic obstructive pulmonary disease) ([facility]) 6/4/2024 Yes    Elevated liver  IV Rocephin 6/4-6/5. IV Zosyn 6/6-6/9. IV Merrem 6/9-current.          Electronically signed by Melissa R Grinnell, PA-C on 6/10/24 at 9:19 AM EDT

## 2024-06-10 NOTE — CONSULTS
IR  Consult received.  Pt was in ENDO lab when I visited.  Will consult tomorrow.    Kasandra Augustin PA-C

## 2024-06-10 NOTE — PROGRESS NOTES
Occupational Therapy Note:    Occupational therapy services are being discontinued at this time d/t patient's decline in medical status. Please re-consult therapy services with MD deems appropriate.    Kae Tapia, OTR/L, CLT

## 2024-06-10 NOTE — INTERDISCIPLINARY ROUNDS
Multi-D Rounds/Checklist (leapfrog):  Lines: can any be removed?: None    Urinary Catheter 06/06/24 Lyles (Active)     Triple Lumen Hemodialysis Catheter (Trialysis) Right Neck (Active)     DVT Prophylaxis: Ordered  Vent: N/A  Nutrition Ordered/appropriate: Contraindicated- possible procedure  Can antibiotics or other drugs be stopped? No End Date set No  Inpat Anti-Infectives (From admission, onward)       Start     Ordered Stop    06/10/24 1900  meropenem (MERREM) 500 mg in sodium chloride 0.9 % 100 mL IVPB (mini-bag)  500 mg,   IntraVENous,   EVERY 24 HOURS        See Rosaura for full Linked Orders Report.    06/09/24 1753 06/11/24 0259                  Consults needed: None  A: Is pain control adequate? (has PRNs? Stop drip?) Yes  B: Sedation break and SBT? N/A  C: Is sedation choice appropriate? N/A  D: Delirium/CAM-ICU? No  E: Mobility goals/appropriateness? Yes  F: Family update and plan? Andreia is surrogate decision maker and is being updated daily by primary attending and nursing staff.    Elaine Griggs, APRN - CNP

## 2024-06-10 NOTE — PROGRESS NOTES
[facility] Critical Care Note:: 6/10/2024  Chris Anderson  Admission Date: 6/4/2024     Length of Stay: 6 days    Background: 73 y.o. male with PMH of DM2, ESRD s/p renal transplant 2016, immunosuppression (on tacrolimus, prednisone, and cellcept), a-fib s/p watchman, CAD s/p CABG (complicated post-op course with prolonged mechanical ventilation and trach), HLD, HTN, CORAL, GERD, COPD, former smoker 30 pack year history quit in 2014.      Patient was just admitted at [facility], discharged on 6/1, with DKA. He presented to his nephrology o/p appointment on 6/4 with increased SOB, pitting edema, and overall ill appearing per provider. He was sent to the ER for evaluation. He was found to have hyperkalema and early kidney injury. He received duoneb, calcium, dextrose, regular insulin, lokelma, and bicarb IVF. After treatment, K decreased only slightly from 6.1 to 6.0. Lactic acid on arrival was elevated at 3.9, procal elevated at 2.6, WBC mildly elevated at 14.7. UA was positive for small leukocytes. Blood cultures were drawn in ER. Bicarb levels 13. He was initially prepped to be admitted to ICU but was downgraded to non-ICU bed after he improved in the ER. .    Notable PMH:  has a past medical history of Anemia, Arrhythmia, Brain bleed ([facility]), Brain bleed ([facility]), Bronchial pneumonia, CAD (coronary artery disease), Carotid artery stenosis, Chronic kidney disease, Chronic obstructive pulmonary disease (HCC), Chronic pain, GERD (gastroesophageal reflux disease), History of echocardiogram, Hypertension, NSVT (nonsustained ventricular tachycardia) ([facility]), PAD (peripheral artery disease) ([facility]), Renal transplant recipient, Respiratory failure ([facility]), Respiratory failure, post-operative (HCC), Seizures ([facility]), Sleep apnea, Thyroid disease, and Type 2 diabetes mellitus ([facility]).    24 Hour events:   HD over the weekend - minimal urine output  Low dose levophed  For ERCP today    Review of Systems: Comprehensive ROS  high complexity decision making for assessment and support including frequent ventilator adjustment, frequent evaluation and titration of therapies , application of advanced monitoring technologies and extensive interpretation of multiple databases    Cumulative time devoted to patient care services by me for day of service is 24 mins.    CRISTINA Ivy - CNP    In this split/shared evaluation I performed reviewed the patients's H&P, available images, labs, cultures., discussed case in detail with NPP, performed a medically appropriate history and exam, counseled and educated the patient and/or family member, ordered and/or reviewed medications, tests or procedures, documented information in EMR, independently interpreted images, and coordinated care. which accounted for 30 minutes clinical time.     Impression:     Patient came from ERCP on ventilator, ERCP was unremarkable   Not ready to be extubated   Plans for SLED today   Still on levophed  Lactic acid still elevated     Denisse Burgess MD

## 2024-06-10 NOTE — PROGRESS NOTES
Respiratory Mechanics completed and are as follows:RR 16 NIF -25, -26. -21     Patient extubated to a HHFNC 60L & 80%. Patient is  able to communicate and is  negative for stridor. Breath sounds are diminished. No complications with extubation.     Jay Becker, NICKIEP

## 2024-06-10 NOTE — CARE COORDINATION
Diagnosis: Dehydration [E86.0]  Hyperkalemia [E87.5]  Acute kidney injury (HCC) [N17.9]  Immunosuppressed status (HCC) [D84.9]  Sepsis secondary to UTI (HCC) [A41.9, N39.0]  Elevated lactic acid level [R79.89]                     Chart reviewed and pt discussed in am IDR. Continues ICU. Levo gtt and HFNC 5L O2. GI following and plans for stent insertion today. Nephrology for dialysis. SLED yesterday.

## 2024-06-10 NOTE — OP NOTE
Procedure: EGD    Indication: plan for ERCP given sepsis of unknown origin    Date of Procedure: 6/10/2024    Patient profile: Refer to patient note in chart for documentation of history and physical    Providers: Luis Rider MD    Referring MD: No ref. provider found    PCP: Ata Melo MD    Medicines: Monitored anesthesia care    Complication: No immediate complications.     Estimated blood loss: Minimal    Procedure: After the risks including, but not limited to medication reaction, infection, bleeding, perforation, missed lesions, benefits and alternatives of the procedure were discussed with the patient and all questions were answered, informed consent was obtained. The gastroscope was passed under direct vision throughout the procedure, the patient's blood pressure pulse and oxygen saturation were monitored continuously. The scope was introduced through the mouth and advanced to the 2nd portion of the duodenum. The endoscopy was performed without difficulty. The patient tolerated the procedure well.   It was decided to utilize the EXALT duodenoscope to minimize the risk of scope associated infection given cholangitis.      Findings:   --The adult gastroscope was introduced from the mouth and advanced through the esophagus to the GE junction.   --The esophagus was normal  --The scope was advanced to the stomach which was  normal in forward and retroflexed views. A hiatal hernia was noted  --Duodenal edema noted. Ampulla was not clearly identified. Rx 44 sphincterotome was used to manipulate the folds, but unfortunately met with little success  The scope was pulled back, and the procedure was subsequently ended.    Impression:  --Duodenal edema, making ampullary identification difficult.     Recommendations:  -- Proceed to EUS to evaluate the bile duct / intrahepatic ducts    Fluoroscopy: All fluoroscopic images were reviewed by myself and decisions were made based on my interpretation of the images. ABRAHAN  total of 3 images were acquired and <10seconds  of fluoroscopy time was utilized.      Luis Rider MD  Gastroenterology and Interventional Endoscopy

## 2024-06-10 NOTE — PROGRESS NOTES
Dialysis completed approximately 2215. Blood successfully rinsed back and flushed with 30 mL saline. 930 mL off. Pt tolerated well.

## 2024-06-11 VITALS
TEMPERATURE: 97.7 F | HEIGHT: 73 IN | BODY MASS INDEX: 31.36 KG/M2 | SYSTOLIC BLOOD PRESSURE: 50 MMHG | DIASTOLIC BLOOD PRESSURE: 36 MMHG | OXYGEN SATURATION: 91 % | WEIGHT: 236.6 LBS | RESPIRATION RATE: 26 BRPM

## 2024-06-11 PROBLEM — I46.9 CARDIAC ARREST (HCC): Status: ACTIVE | Noted: 2024-06-11

## 2024-06-11 LAB — PATH REV BLD -IMP: NORMAL

## 2024-06-11 PROCEDURE — C1894 INTRO/SHEATH, NON-LASER: HCPCS

## 2024-06-11 PROCEDURE — 36620 INSERTION CATHETER ARTERY: CPT | Performed by: EMERGENCY MEDICINE

## 2024-06-11 PROCEDURE — 94003 VENT MGMT INPAT SUBQ DAY: CPT

## 2024-06-11 PROCEDURE — 2580000003 HC RX 258: Performed by: INTERNAL MEDICINE

## 2024-06-11 PROCEDURE — 2580000003 HC RX 258: Performed by: EMERGENCY MEDICINE

## 2024-06-11 PROCEDURE — C1751 CATH, INF, PER/CENT/MIDLINE: HCPCS

## 2024-06-11 PROCEDURE — 03HY32Z INSERTION OF MONITORING DEVICE INTO UPPER ARTERY, PERCUTANEOUS APPROACH: ICD-10-PCS | Performed by: EMERGENCY MEDICINE

## 2024-06-11 PROCEDURE — 6360000002 HC RX W HCPCS: Performed by: EMERGENCY MEDICINE

## 2024-06-11 PROCEDURE — 05HN33Z INSERTION OF INFUSION DEVICE INTO LEFT INTERNAL JUGULAR VEIN, PERCUTANEOUS APPROACH: ICD-10-PCS | Performed by: INTERNAL MEDICINE

## 2024-06-11 PROCEDURE — 2500000003 HC RX 250 WO HCPCS: Performed by: INTERNAL MEDICINE

## 2024-06-11 PROCEDURE — C1713 ANCHOR/SCREW BN/BN,TIS/BN: HCPCS

## 2024-06-11 PROCEDURE — 36556 INSERT NON-TUNNEL CV CATH: CPT | Performed by: EMERGENCY MEDICINE

## 2024-06-11 RX ORDER — EPINEPHRINE IN SOD CHLOR,ISO 1 MG/10 ML
SYRINGE (ML) INTRAVENOUS
Status: COMPLETED | OUTPATIENT
Start: 2024-06-10 | End: 2024-06-10

## 2024-06-11 RX ORDER — MAGNESIUM SULFATE IN WATER 40 MG/ML
INJECTION, SOLUTION INTRAVENOUS CONTINUOUS PRN
Status: COMPLETED | OUTPATIENT
Start: 2024-06-10 | End: 2024-06-10

## 2024-06-11 RX ORDER — CALCIUM CHLORIDE 100 MG/ML
INJECTION INTRAVENOUS; INTRAVENTRICULAR
Status: COMPLETED | OUTPATIENT
Start: 2024-06-10 | End: 2024-06-10

## 2024-06-11 RX ORDER — FENTANYL CITRATE-0.9 % NACL/PF 20 MCG/2ML
50 SYRINGE (ML) INTRAVENOUS EVERY 30 MIN PRN
Status: DISCONTINUED | OUTPATIENT
Start: 2024-06-11 | End: 2024-06-11 | Stop reason: HOSPADM

## 2024-06-11 RX ADMIN — EPINEPHRINE 50 MCG/MIN: 1 INJECTION INTRAMUSCULAR; INTRAVENOUS; SUBCUTANEOUS at 02:24

## 2024-06-11 RX ADMIN — SODIUM CHLORIDE 50 MCG/MIN: 9 INJECTION, SOLUTION INTRAVENOUS at 00:26

## 2024-06-11 RX ADMIN — EPINEPHRINE 50 MCG/MIN: 1 INJECTION INTRAMUSCULAR; INTRAVENOUS; SUBCUTANEOUS at 00:32

## 2024-06-11 ASSESSMENT — PULMONARY FUNCTION TESTS: PIF_VALUE: 31

## 2024-06-11 NOTE — RT PROTOCOL NOTE
Patient was intubated with a number 7.5 ET Tube using Hunter vision device without adverse rxn or complication. Tube placement verified by auscultation, by CXR, and ETCO2 monitor. ET Tube is secured at the 23 cm miguel angel at the lip at midline position. Patient was intubated by Dk Snyder RRT RCP on the 1st attempt. Breath sounds are mainly CTAB. Patient is Positive for subcutaneous air and chest excursion is symmetrical. Trachea is midline.  Patient is also Negative for cyanosis and is Negative for pitting edema.  Patient placed on ventilator on documented settings. All alarms are set and audible. Resuscitation bag with mask and PEEP valve is at the head of the bed.      Ventilator Settings  Mode FIO2 Rate Tidal Volume Pressure PEEP I:E Ratio   PRVC 100 %   20   450cc     8 1:2.3     Peak airway pressure:   3cm H2O  Minute ventilation:   9.55 L    Dk Snyder RRT RCP

## 2024-06-11 NOTE — PROCEDURES
PROCEDURE NOTE  Date: 2024   Name: Chris Anderson  YOB: 1950    Procedures        Procedure: Endotracheal intubation    Indication: Acute respiratory failure 518.81    Medications:  Fentanyl 0mcg   Etomidate 0mg   Propofol 0 mg  Succinylcholine 0 mg    After assessing the airway, the patient underwent preoxygenation with 100% FiO2 for 1 min.  The Sellick maneuver was performed throughout the entire sequence.  After adequate sedation and paralysis intubation was performed.    A Kingvision #3 laryngoscope was used to visualize the epiglottis and vocal cords.    After positive identification of epiglottis and the vocal cords, a size 7.5 ET tube was placed into the trachea with direct visualization.      Confirmation of endotracheal positionin.  The ET tube was directly visualized passing through the vocal cords.    2.  CO2 colorimetry was employed immediately to verify tube in airway with appropriate color change indicating detection/lack of CO2.   3.  Water vapor was seen within the ET tube, and auscultation of the abdomen revealed no bubbling sounds.  4.  Auscultation  And inspection of the chest after intubation showed symmetric chest excursion and symmetric air entry bilaterally.  5.  Chest X-ray has been ordered and is pending.    The tube was secured at 25cm at the teeth with a tube pham.  The patient has been placed on a mechanical ventilator.    There were no complications.    Wiliam Escamilla MD

## 2024-06-11 NOTE — CODE DOCUMENTATION
CODE BLUE PROCEDURE NOTE:    I responded to overhead code blue page. CPR was initiated and conducted as per ACLS protocol.    Initial Rhythm: PEA     Defibrillation: Performed x 0    Total duration of CPR: 12 minutes    Drugs:     Epinephrine 1mg x 3    Other Drugs given:   Bicarbonate and Calcium     Pressors initiated:   Levophed, Epinephrine, and Neosynephrine     Intubation: Performed by Dr. Escamilla    Lines Placed: Central line , arterial line    Result of Code: ROSC @ 2134 then again at 2253    Next of kin notified: Yes    Primary attending notified: Yes    Labs ordered: full rainbow    Wiliam Escamilla MD

## 2024-06-11 NOTE — PROGRESS NOTES
CH responded to Code Blue. NS introduced CH to Family. CH offered support and hospitality. CH offered support to individual family members as needed including prayer.  CH gave Family the CH phone number. PT coded again. CH offered support.       Rev. COBY [email or web address].

## 2024-06-11 NOTE — PROGRESS NOTES
All three children and girlfriend at bedside with patient. Patient was made a DNR per their request. Remains maxed on all 4 pressers at this time.

## 2024-06-11 NOTE — PROGRESS NOTES
Patient maxed on 4 pressers at this time. MAP 53. Children at bedside and verbally stated they did not want chest compressions in the event he arrests again. Verbalized that they want to make patient DNR at this time. Notified Dr. Escamilla.

## 2024-06-11 NOTE — PROCEDURES
PROCEDURE NOTE  Date: 6/11/2024   Name: Chris Anderson  YOB: 1950    Procedures        PROCEDURE NOTE  ARTERIAL LINE PLACEMENT  06/11/24  8:10 AM    Indication: shock in need of hemodynamic monitoring    A time-out was completed verifying correct patient, procedure, site, positioning, and special equipment if applicable. Kraig’s test was performed to ensure adequate perfusion. The patient’s left wrist was prepped and draped in sterile fashion. A 20G Arrow arterial line was introduced into the left radial artery. The catheter was threaded over the guide wire and the needle was removed with appropriate pulsatile blood return. The catheter was then sutured in place to the skin and a sterile dressing applied. Perfusion to the extremity distal to the point of catheter insertion was checked and found to be adequate.     The patient tolerated the procedure well and there were no complications.    Wiliam Escamilla MD

## 2024-06-11 NOTE — PROGRESS NOTES
Hospitalist Rapid Response or Critical Care Event   Admit Date:  2024  5:04 PM   Name:  Chris Anderson   Age:  73 y.o.  Sex:  male  :  1950   MRN:  [id number]   Room:  [address] [place] [address]    Presenting Complaint: Shortness of Breath and Leg Swelling    Reason(s) for Admission: Dehydration [E86.0]  Hyperkalemia [E87.5]  Acute kidney injury (HCC) [N17.9]  Immunosuppressed status (HCC) [D84.9]  Sepsis secondary to UTI (HCC) [A41.9, N39.0]  Elevated lactic acid level [R79.89]     Rapid Response or Critical Care Event:     Code Blue called after patient found without a pulse. BLS and ACLS protocol started. ROSC obtained, patient sedated and intubated. Patient on 2 pressors. Labs and CXR ordered. Please see code notes for further details    Discussed with patient's family in the ICU waiting room. Patient's son said, \"he had told the doctors that he did not want to die\" upon admission. Course of code and current critical status discussed at bedside. All questions answered.     There is a high probability of acute organ impairment or life-threatening deterioration in the patient's condition from:   Cardiopulmonary arrest, Code Blue    Critical care and other interventions:   BLS/ACLS    Total critical care time spent: 40 minutes.      Advance Care Planning note done: Yes (ACP note charted separately)      Objective:   Patient Vitals for the past 24 hrs:   Temp Pulse Resp BP SpO2   06/10/24 2200 -- 85 -- -- --   06/10/24 1757 -- 85 20 -- 93 %   06/10/24 1600 -- 81 16 (!) 106/53 (!) 88 %   06/10/24 1545 -- 68 21 (!) 116/53 (!) 87 %   06/10/24 1535 -- 69 28 -- 97 %   06/10/24 1530 -- 68 19 (!) 108/58 95 %   06/10/24 1515 -- 74 25 (!) 111/56 95 %   06/10/24 1500 98.6 °F (37 °C) 68 24 (!) 103/53 92 %   06/10/24 1445 -- 74 21 (!) 101/55 94 %   06/10/24 1430 -- 68 25 91/65 95 %   06/10/24 1415 -- 72 18 (!) 94/58 95 %   06/10/24 1400 -- 68 20 (!) 113/53 94 %   06/10/24 1345 -- 67 19 (!) 85/49 95 %   06/10/24 1330 -- 66 23  of the abdomen were obtained for approximately 60 minutes following ministration of 6 mCi technetium 99 M Choletec. FINDINGS: There is radiotracer extraction from the blood pool with relatively homogeneous radiotracer uptake throughout the liver. There is focal radiotracer uptake at the inferior aspect of the liver although somewhat late in the exam likely compatible with the gallbladder although a confirmatory lateral image could not be performed per report. There is a notable lack of distal biliary tree uptake with no evident biliary to bowel transition of radiotracer.     Likely visualization of the gallbladder suggesting against acute cholecystitis. Gallbladder visualization was somewhat delayed which can be seen with more chronic conditions. Underlying hepatocellular dysfunction may be playing a role as well. Notable lack of biliary to bowel transition which raises concern for common bile duct obstruction. Recommend direct visualization if MRI cannot be performed. Electronically signed by Chris Rahman    XR CHEST PORTABLE    Result Date: 6/9/2024  Chest X-ray INDICATION: Hypoxia AP/PA view of the chest was obtained. FINDINGS: Comparison is made to 6/8/2024. Dialysis catheter from the right extends to the superior vena cava. There is no pneumothorax. There is elevation the right hemidiaphragm with mild central pulm venous congestion and interstitial edema. There is no effusion. The heart is not enlarged. There are median sternotomy wires as well as a cardiac pacemaker with unchanged position of leads. The overall appearance of the chest is without significant change from compared to prior.    Pulmonary vascular congestion and interstitial edema stable compared to prior study. Dialysis catheter, unchanged position. Pacemaker. Electronically signed by Carlo Cote      Current Meds:  Current Facility-Administered Medications   Medication Dose Route Frequency    VASOpressin (VASOSTRICT) 20 units in dextrose

## 2024-06-11 NOTE — PROGRESS NOTES
Code Blue called after RN found patient to have worsening respiratory distress and no pulse. BLS and ACLS protocol started immediately. ROSC obtained, patient was intubated.     Second code blue called and ROSC was obtained. Patient's remained hypotension on 4 pressers. All family at bedside with patient.

## 2024-06-11 NOTE — PROCEDURES
PROCEDURE NOTE  Date: 6/11/2024   Name: Chris Anderson  YOB: 1950    Procedures        Procedure: Central Line Insertion  (CPT - 24856)    Indication:  cardiac arrest    Chlorohexidine Skin Antiseptic: Yes  Hand Hygiene: Yes  Maximal Barrier Precautions: Yes  Optimal Catheter Site Selection: Yes  Sterile Ultrasound Technique: Yes    Location:  left internal jugular    Time out done and correct patient/location for procedure.    Area prepped and sterilized with chlorhexedine. Sterile drapes applied.  Patient given local lidocane for anesthesia. Using Seldinger technique triple lumen lumen catheter inserted. Guidewire removed. All ports with blood return and lines flushed. Line sutured in place. Patient tolerated procedure well, no complications.   Estimated Blood loss: nil    Wiliam Escamilla MD

## 2024-06-11 NOTE — PROGRESS NOTES
Patient lethargic, diaphoretic, and responding to minimal painful stimuli. Pupils sluggish to react. Second presser started and Dr. Escamilla notified. Dr. Escamilla at bedside with patient at this time.

## 2024-06-13 LAB
FUNGITELL INTERPRETATION: NORMAL
FUNGITELL: <31.25 PG/ML
Lab: NORMAL
REFERENCE VALUE: NORMAL
RESULT: NEGATIVE

## 2024-06-13 NOTE — FLOWSHEET NOTE
Added to restraint log as death within 24 hours of being in bilateral soft wrist restraints.  6/13/2024  12:07pm.

## 2024-06-15 LAB
HEPARIN INDUCED PLATELET ANTIBODY: NORMAL
HIT INTERPRETATION: NORMAL
HIT OPTICAL DENSITY: NORMAL ABS
HIT PROFILE: NORMAL

## 2024-06-22 NOTE — PROGRESS NOTES
Physician Progress Note      PATIENT:               IVAN WELLS  [facility] #:                  [id number]  :                       1950  ADMIT DATE:       2024 5:04 PM  DISCH DATE:        2024 3:31 AM  RESPONDING  PROVIDER #:        Bam George MD          QUERY TEXT:    Pt admitted with sepsis. Pt noted to have ESDRAS. If possible, please document in   the progress notes and discharge summary if you are evaluating and/or   treating any of the following:    The medical record reflects the following:  Risk Factors: 73 y.o. male with medical history of renal transplant 2016 [facility]   on CellCept/tacrolimus/prednisone, CKD 3  Clinical Indicators: Per nephrology notes \"ESDRAS - now oliguric, likely ATN.\"  Treatment: Nephrology consult, HD line placed, plan to start SLED, Serial labs    Defined by Kidney Disease Improving Global Outcomes (KDIGO) clinical practice   guideline for acute kidney injury:  -Increase in SCr by greater than or equal to 0.3 mg/dl within 48 hours; or  -Increase or decrease in SCr to greater than or equal to 1.5 times baseline,   which is known or presumed to have occurred within the prior 7 days; or  -Urine volume < 0.5ml/kg/h for 6 hours    Thank you,  Judy Cristina RN, BSN, CDI  [email or web address]  .  Options provided:  -- Acute kidney failure with acute tubular necrosis  -- Acute kidney failure  -- Acute kidney injury  -- Other - I will add my own diagnosis  -- Disagree - Not applicable / Not valid  -- Disagree - Clinically unable to determine / Unknown  -- Refer to Clinical Documentation Reviewer    PROVIDER RESPONSE TEXT:    This patient has an Acute kidney injury.    Query created by: Judy Cristina on 2024 7:49 AM      Electronically signed by:  Bam George MD 2024 3:28 PM

## 2024-07-04 NOTE — DISCHARGE SUMMARY
the past 24 hour(s)).      Recent Vital Data:  No data found.    Oxygen Therapy  SpO2: 91 %  Pulse Oximetry Type: Continuous  Pulse via Oximetry: 69 beats per minute  SPO2 High Alarm Limit: 100  SPO2 Low Alarm Limit POX: 89  Pulse Oximeter Device Mode: Continuous  Pulse Oximeter Device Location: Forehead  O2 Device: Ventilator  Oximetry Probe Site Changed: Yes  Skin Assessment: Clean, dry, & intact  Skin Protection for O2 Device: N/A  Orientation: Anterior  FiO2 : 100 %  O2 Flow Rate (L/min): 60 L/min  Blood Gas  Performed?: Yes  Kraig's Test #1: Collateral flow confirmed  Site #1: Right Radial  Site Prepped #1: Yes  Number of Attempts #1: 1  Pressure Held #1: Yes  Complications #1: None  Post-procedure #1: Standard  Specimen Status #1: Point of care  How Tolerated?: Tolerated well  Oxygen Therapy: None (Room air)  Vent Mode: AC/PRVC    Estimated body mass index is 31.22 kg/m² as calculated from the following:    Height as of this encounter: 1.854 m (6' 0.99\").    Weight as of this encounter: 107.3 kg (236 lb 9.6 oz).  No intake or output data in the 24 hours ending 07/03/24 7039        Signed:  Bam Hanson MD    Part of this note may have been written by using a voice dictation software.  The note has been proof read but may still contain some grammatical/other typographical errors.

## (undated) DEVICE — SYRINGE, LUER SLIP, STERILE, 60ML: Brand: MEDLINE

## (undated) DEVICE — Device

## (undated) DEVICE — GAUZE,SPONGE,4"X4",12PLY,WOVEN,NS,LF: Brand: MEDLINE

## (undated) DEVICE — SYRINGE MED 10ML LUERLOCK TIP W/O SFTY DISP

## (undated) DEVICE — DRSG GZ OIL EMUL CURAD 3X8 --

## (undated) DEVICE — AIRLIFE™ OXYGEN TUBING 7 FEET (2.1 M) CRUSH RESISTANT OXYGEN TUBING, VINYL TIPPED: Brand: AIRLIFE™

## (undated) DEVICE — ENDOSCOPIC KIT 1.1+ OP4 CA DE 2 GWN AAMI LEVEL 3

## (undated) DEVICE — AMD ANTIMICROBIAL BANDAGE ROLL,6 PLY: Brand: KERLIX

## (undated) DEVICE — REM POLYHESIVE ADULT PATIENT RETURN ELECTRODE: Brand: VALLEYLAB

## (undated) DEVICE — STRETCH BANDAGE ROLL: Brand: DERMACEA

## (undated) DEVICE — SYRINGE MEDICAL 3ML CLEAR PLASTIC STANDARD NON CONTROL LUERLOCK TIP DISPOSABLE

## (undated) DEVICE — SPHINCTEROTOME: Brand: JAGTOME RX 39

## (undated) DEVICE — Device: Brand: BALLOON3

## (undated) DEVICE — BLOCK BITE AD 60FR W/ VELC STRP ADDRESSES MOST PT AND

## (undated) DEVICE — 1200 GUARD II KIT W/5MM TUBE W/O VAC TUBE: Brand: GUARDIAN

## (undated) DEVICE — GARMENT,MEDLINE,DVT,INT,CALF,MED, GEN2: Brand: MEDLINE

## (undated) DEVICE — CURITY NON-ADHERENT STRIPS: Brand: CURITY

## (undated) DEVICE — SPHINCTEROTOME: Brand: DREAMTOME™ RX 44

## (undated) DEVICE — KENDALL RADIOLUCENT FOAM MONITORING ELECTRODE RECTANGULAR SHAPE: Brand: KENDALL

## (undated) DEVICE — DUODENOSCOPE STRL SNGLE USE EXALT CNTRLR BX 2

## (undated) DEVICE — SINGLE PORT MANIFOLD: Brand: NEPTUNE 2

## (undated) DEVICE — LUBE JELLY FOIL PACK 1.4 OZ: Brand: MEDLINE INDUSTRIES, INC.

## (undated) DEVICE — BUTTON SWITCH PENCIL BLADE ELECTRODE, HOLSTER: Brand: EDGE

## (undated) DEVICE — CONNECTOR TBNG OD5-7MM O2 END DISP

## (undated) DEVICE — CANNULA NSL ORAL AD FOR CAPNOFLEX CO2 O2 AIRLFE

## (undated) DEVICE — GOWN,PREVENTION PLUS,2XL,ST,22/CS: Brand: MEDLINE

## (undated) DEVICE — YANKAUER,BULB TIP,W/O VENT,RIGID,STERILE: Brand: MEDLINE

## (undated) DEVICE — NEEDLE SYR 18GA L1.5IN RED PLAS HUB S STL BLNT FILL W/O

## (undated) DEVICE — ZIMMER® STERILE DISPOSABLE TOURNIQUET CUFF WITH PLC, DUAL PORT, SINGLE BLADDER, 18 IN. (46 CM)

## (undated) DEVICE — SOLUTION IV 1000ML 0.9% SOD CHL